# Patient Record
Sex: MALE | Race: WHITE | Employment: OTHER | URBAN - METROPOLITAN AREA
[De-identification: names, ages, dates, MRNs, and addresses within clinical notes are randomized per-mention and may not be internally consistent; named-entity substitution may affect disease eponyms.]

---

## 2017-12-05 ENCOUNTER — OFFICE VISIT (OUTPATIENT)
Dept: SURGERY | Facility: CLINIC | Age: 77
End: 2017-12-05

## 2017-12-05 VITALS
TEMPERATURE: 98 F | HEIGHT: 66 IN | BODY MASS INDEX: 22.98 KG/M2 | WEIGHT: 143 LBS | DIASTOLIC BLOOD PRESSURE: 83 MMHG | HEART RATE: 76 BPM | SYSTOLIC BLOOD PRESSURE: 135 MMHG

## 2017-12-05 DIAGNOSIS — K59.01 SLOW TRANSIT CONSTIPATION: ICD-10-CM

## 2017-12-05 DIAGNOSIS — I10 HYPERTENSION, UNSPECIFIED TYPE: ICD-10-CM

## 2017-12-05 DIAGNOSIS — Z90.49 HISTORY OF CHOLECYSTECTOMY: ICD-10-CM

## 2017-12-05 DIAGNOSIS — C61 PROSTATE CANCER (HCC): ICD-10-CM

## 2017-12-05 DIAGNOSIS — Z90.79 S/P PROSTATECTOMY: ICD-10-CM

## 2017-12-05 DIAGNOSIS — E78.49 OTHER HYPERLIPIDEMIA: ICD-10-CM

## 2017-12-05 DIAGNOSIS — D12.6 ADENOMATOUS POLYP OF COLON, UNSPECIFIED PART OF COLON: Primary | ICD-10-CM

## 2017-12-05 DIAGNOSIS — Z80.0 FAMILY HISTORY OF COLON CANCER: ICD-10-CM

## 2017-12-05 PROCEDURE — 99203 OFFICE O/P NEW LOW 30 MIN: CPT | Performed by: PHYSICIAN ASSISTANT

## 2017-12-05 RX ORDER — LEVOTHYROXINE SODIUM 0.03 MG/1
50 TABLET ORAL DAILY
COMMUNITY
Start: 2017-09-09 | End: 2021-01-21

## 2017-12-05 RX ORDER — POLYETHYLENE GLYCOL 3350, SODIUM CHLORIDE, SODIUM BICARBONATE, POTASSIUM CHLORIDE 420; 11.2; 5.72; 1.48 G/4L; G/4L; G/4L; G/4L
POWDER, FOR SOLUTION ORAL
Qty: 1 BOTTLE | Refills: 0 | Status: CANCELLED | OUTPATIENT
Start: 2017-12-05

## 2017-12-05 RX ORDER — ATORVASTATIN CALCIUM 10 MG/1
10 TABLET, FILM COATED ORAL NIGHTLY
COMMUNITY
Start: 2017-11-27

## 2017-12-07 NOTE — H&P
New Patient Visit Note       Active Problems      1. Adenomatous polyp of colon, unspecified part of colon    2. Family history of colon cancer    3. Slow transit constipation    4. Hypertension, unspecified type    5. Other hyperlipidemia    6.  Prostate c Social / Family History    The past medical and past surgical history have been reviewed by me today.     Past Medical History:   Diagnosis Date   • Essential hypertension    • Hyperlipidemia    • Prostate cancer [facility])    • Sleep apnea      Past Surgical Hi wheezing. Cardiovascular: Negative for chest pain, palpitations and leg swelling. Gastrointestinal: Negative for abdominal distention, abdominal pain, anal bleeding, blood in stool, constipation, diarrhea, nausea and vomiting.    Genitourinary: Norma Sánchez and confirmed negative in the left inguinal area. Lymphadenopathy:     He has no cervical adenopathy. Right cervical: No superficial cervical and no deep cervical adenopathy present.        Left cervical: No superficial cervical and no deep cervica for Surgery with Dr. Sesar Hopper. All risks, benefits, complications and alternatives to the proposed operation were fully discussed with the patient. All questions from the patient were answered in detail.  A description of the procedure and it's possible o

## 2018-10-23 ENCOUNTER — CHARTING TRANS (OUTPATIENT)
Dept: OTHER | Age: 78
End: 2018-10-23

## 2018-11-21 ENCOUNTER — CHARTING TRANS (OUTPATIENT)
Dept: OTHER | Age: 78
End: 2018-11-21

## 2018-12-04 ENCOUNTER — OFFICE VISIT (OUTPATIENT)
Dept: PULMONOLOGY | Age: 78
End: 2018-12-04

## 2018-12-04 VITALS
HEIGHT: 65 IN | SYSTOLIC BLOOD PRESSURE: 120 MMHG | DIASTOLIC BLOOD PRESSURE: 70 MMHG | HEART RATE: 76 BPM | OXYGEN SATURATION: 98 % | BODY MASS INDEX: 24.66 KG/M2 | WEIGHT: 148 LBS

## 2018-12-04 DIAGNOSIS — G47.33 OBSTRUCTIVE SLEEP APNEA (ADULT) (PEDIATRIC): Primary | ICD-10-CM

## 2018-12-04 PROCEDURE — 99203 OFFICE O/P NEW LOW 30 MIN: CPT | Performed by: INTERNAL MEDICINE

## 2018-12-04 RX ORDER — LEVOTHYROXINE SODIUM 0.05 MG/1
50 TABLET ORAL
COMMUNITY
Start: 2018-02-14 | End: 2019-02-11 | Stop reason: SDUPTHER

## 2018-12-04 RX ORDER — ATORVASTATIN CALCIUM 10 MG/1
10 TABLET, FILM COATED ORAL
COMMUNITY
Start: 2017-11-27 | End: 2019-01-29 | Stop reason: SDUPTHER

## 2018-12-04 RX ORDER — AMLODIPINE BESYLATE 5 MG/1
5 TABLET ORAL
COMMUNITY
Start: 2018-02-04 | End: 2019-01-18 | Stop reason: SDUPTHER

## 2018-12-04 RX ORDER — LOSARTAN POTASSIUM AND HYDROCHLOROTHIAZIDE 25; 100 MG/1; MG/1
TABLET ORAL
COMMUNITY
End: 2019-01-29

## 2018-12-04 SDOH — HEALTH STABILITY: MENTAL HEALTH: HOW OFTEN DO YOU HAVE A DRINK CONTAINING ALCOHOL?: NEVER

## 2018-12-18 ENCOUNTER — DOCUMENTATION (OUTPATIENT)
Dept: PULMONOLOGY | Age: 78
End: 2018-12-18

## 2019-01-01 ENCOUNTER — EXTERNAL RECORD (OUTPATIENT)
Dept: HEALTH INFORMATION MANAGEMENT | Facility: OTHER | Age: 79
End: 2019-01-01

## 2019-01-15 DIAGNOSIS — E03.9 HYPOTHYROIDISM, UNSPECIFIED TYPE: ICD-10-CM

## 2019-01-15 DIAGNOSIS — E78.5 HYPERLIPIDEMIA, UNSPECIFIED HYPERLIPIDEMIA TYPE: ICD-10-CM

## 2019-01-15 DIAGNOSIS — G47.33 OSA ON CPAP: ICD-10-CM

## 2019-01-15 DIAGNOSIS — Z86.010 HISTORY OF COLON POLYPS: ICD-10-CM

## 2019-01-15 DIAGNOSIS — Z85.46 HX OF PROSTATIC MALIGNANCY: ICD-10-CM

## 2019-01-15 DIAGNOSIS — I10 ESSENTIAL HYPERTENSION: Primary | ICD-10-CM

## 2019-01-18 ENCOUNTER — TELEPHONE (OUTPATIENT)
Dept: FAMILY MEDICINE | Age: 79
End: 2019-01-18

## 2019-01-18 RX ORDER — AMLODIPINE BESYLATE 5 MG/1
5 TABLET ORAL DAILY
Qty: 30 TABLET | Refills: 2 | Status: SHIPPED | OUTPATIENT
Start: 2019-01-18 | End: 2019-02-11 | Stop reason: SDUPTHER

## 2019-01-24 ENCOUNTER — IMAGING SERVICES (OUTPATIENT)
Dept: OTHER | Age: 79
End: 2019-01-24

## 2019-01-29 ENCOUNTER — TELEPHONE (OUTPATIENT)
Dept: FAMILY MEDICINE | Age: 79
End: 2019-01-29

## 2019-01-29 RX ORDER — LOSARTAN POTASSIUM 100 MG/1
100 TABLET ORAL DAILY
Qty: 90 TABLET | Refills: 0 | Status: SHIPPED | OUTPATIENT
Start: 2019-01-29 | End: 2019-02-11 | Stop reason: SDUPTHER

## 2019-01-29 RX ORDER — LOSARTAN POTASSIUM 100 MG/1
100 TABLET ORAL DAILY
COMMUNITY
Start: 2018-01-08 | End: 2019-01-29 | Stop reason: SDUPTHER

## 2019-01-29 RX ORDER — ATORVASTATIN CALCIUM 10 MG/1
10 TABLET, FILM COATED ORAL DAILY
Qty: 90 TABLET | Refills: 0 | Status: SHIPPED | OUTPATIENT
Start: 2019-01-29 | End: 2019-02-11 | Stop reason: SDUPTHER

## 2019-02-11 ENCOUNTER — OFFICE VISIT (OUTPATIENT)
Dept: FAMILY MEDICINE | Age: 79
End: 2019-02-11

## 2019-02-11 VITALS
OXYGEN SATURATION: 97 % | HEART RATE: 84 BPM | BODY MASS INDEX: 25.52 KG/M2 | WEIGHT: 151 LBS | DIASTOLIC BLOOD PRESSURE: 74 MMHG | SYSTOLIC BLOOD PRESSURE: 116 MMHG | TEMPERATURE: 97.7 F

## 2019-02-11 DIAGNOSIS — E03.9 HYPOTHYROIDISM, UNSPECIFIED TYPE: ICD-10-CM

## 2019-02-11 DIAGNOSIS — J31.0 RHINITIS, CHRONIC: ICD-10-CM

## 2019-02-11 DIAGNOSIS — E78.5 HYPERLIPIDEMIA, UNSPECIFIED HYPERLIPIDEMIA TYPE: ICD-10-CM

## 2019-02-11 DIAGNOSIS — I10 ESSENTIAL HYPERTENSION: Primary | ICD-10-CM

## 2019-02-11 DIAGNOSIS — R09.82 POSTNASAL DRIP: ICD-10-CM

## 2019-02-11 PROCEDURE — 99214 OFFICE O/P EST MOD 30 MIN: CPT | Performed by: FAMILY MEDICINE

## 2019-02-11 RX ORDER — LEVOTHYROXINE SODIUM 0.05 MG/1
50 TABLET ORAL DAILY
Qty: 90 TABLET | Refills: 3 | Status: SHIPPED | OUTPATIENT
Start: 2019-02-11 | End: 2019-04-25 | Stop reason: SDUPTHER

## 2019-02-11 RX ORDER — AMLODIPINE BESYLATE 5 MG/1
5 TABLET ORAL DAILY
Qty: 90 TABLET | Refills: 3 | Status: SHIPPED | OUTPATIENT
Start: 2019-02-11 | End: 2019-04-25 | Stop reason: SDUPTHER

## 2019-02-11 RX ORDER — ATORVASTATIN CALCIUM 10 MG/1
10 TABLET, FILM COATED ORAL DAILY
Qty: 90 TABLET | Refills: 3 | Status: SHIPPED | OUTPATIENT
Start: 2019-02-11 | End: 2019-04-25 | Stop reason: SDUPTHER

## 2019-02-11 RX ORDER — LOSARTAN POTASSIUM 100 MG/1
100 TABLET ORAL DAILY
Qty: 90 TABLET | Refills: 3 | Status: SHIPPED | OUTPATIENT
Start: 2019-02-11 | End: 2019-04-25 | Stop reason: SDUPTHER

## 2019-02-11 SDOH — HEALTH STABILITY: MENTAL HEALTH: HOW OFTEN DO YOU HAVE A DRINK CONTAINING ALCOHOL?: NEVER

## 2019-02-15 ENCOUNTER — OFFICE VISIT (OUTPATIENT)
Dept: FAMILY MEDICINE | Age: 79
End: 2019-02-15

## 2019-02-15 VITALS
SYSTOLIC BLOOD PRESSURE: 122 MMHG | WEIGHT: 150 LBS | BODY MASS INDEX: 25.35 KG/M2 | OXYGEN SATURATION: 98 % | HEART RATE: 86 BPM | DIASTOLIC BLOOD PRESSURE: 80 MMHG

## 2019-02-15 DIAGNOSIS — R22.2 ABDOMINAL WALL LUMP: Primary | ICD-10-CM

## 2019-02-15 PROBLEM — I10 ESSENTIAL HYPERTENSION, BENIGN: Status: ACTIVE | Noted: 2017-05-11

## 2019-02-15 PROBLEM — D12.6 ADENOMATOUS POLYP OF COLON: Status: ACTIVE | Noted: 2017-12-05

## 2019-02-15 PROBLEM — E78.49 OTHER HYPERLIPIDEMIA: Status: ACTIVE | Noted: 2017-12-05

## 2019-02-15 PROBLEM — Z90.49 HISTORY OF CHOLECYSTECTOMY: Status: ACTIVE | Noted: 2017-12-05

## 2019-02-15 PROBLEM — C61 CARCINOMA OF PROSTATE (CMD): Status: ACTIVE | Noted: 2017-05-11

## 2019-02-15 PROBLEM — C61 MALIGNANT NEOPLASM OF PROSTATE (CMD): Status: ACTIVE | Noted: 2017-05-11

## 2019-02-15 PROBLEM — Z90.79 S/P PROSTATECTOMY: Status: ACTIVE | Noted: 2017-12-05

## 2019-02-15 PROBLEM — N39.42 URINARY INCONTINENCE WITHOUT SENSORY AWARENESS: Status: ACTIVE | Noted: 2017-05-11

## 2019-02-15 PROBLEM — I10 HTN (HYPERTENSION): Status: ACTIVE | Noted: 2017-12-05

## 2019-02-15 PROCEDURE — 99213 OFFICE O/P EST LOW 20 MIN: CPT | Performed by: PHYSICIAN ASSISTANT

## 2019-02-16 ENCOUNTER — IMAGING SERVICES (OUTPATIENT)
Dept: ULTRASOUND IMAGING | Age: 79
End: 2019-02-16
Attending: PHYSICIAN ASSISTANT

## 2019-02-16 DIAGNOSIS — R22.2 ABDOMINAL WALL LUMP: ICD-10-CM

## 2019-02-16 PROCEDURE — 76705 ECHO EXAM OF ABDOMEN: CPT | Performed by: RADIOLOGY

## 2019-02-18 DIAGNOSIS — K42.9 UMBILICAL HERNIA WITHOUT OBSTRUCTION OR GANGRENE: Primary | ICD-10-CM

## 2019-02-18 RX ORDER — CHLORDIAZEPOXIDE HYDROCHLORIDE 5 MG/1
CAPSULE, GELATIN COATED ORAL
COMMUNITY
End: 2019-04-25 | Stop reason: ALTCHOICE

## 2019-02-18 RX ORDER — MOMETASONE FUROATE 1 MG/G
OINTMENT TOPICAL
COMMUNITY
End: 2020-09-09 | Stop reason: ALTCHOICE

## 2019-02-25 ENCOUNTER — TELEPHONE (OUTPATIENT)
Dept: SURGERY | Age: 79
End: 2019-02-25

## 2019-02-25 ENCOUNTER — OFFICE VISIT (OUTPATIENT)
Dept: SURGERY | Age: 79
End: 2019-02-25

## 2019-02-25 VITALS — HEIGHT: 64 IN | BODY MASS INDEX: 24.24 KG/M2 | WEIGHT: 142 LBS | TEMPERATURE: 98.4 F

## 2019-02-25 DIAGNOSIS — K43.2 INCISIONAL HERNIA, WITHOUT OBSTRUCTION OR GANGRENE: Primary | ICD-10-CM

## 2019-02-25 PROCEDURE — 99204 OFFICE O/P NEW MOD 45 MIN: CPT | Performed by: SURGERY

## 2019-02-25 SDOH — HEALTH STABILITY: MENTAL HEALTH: HOW OFTEN DO YOU HAVE A DRINK CONTAINING ALCOHOL?: NEVER

## 2019-02-25 ASSESSMENT — ENCOUNTER SYMPTOMS
RESPIRATORY NEGATIVE: 1
ENDOCRINE NEGATIVE: 1
GASTROINTESTINAL NEGATIVE: 1
NEUROLOGICAL NEGATIVE: 1
ALLERGIC/IMMUNOLOGIC NEGATIVE: 1
HEMATOLOGIC/LYMPHATIC NEGATIVE: 1
CONSTITUTIONAL NEGATIVE: 1

## 2019-02-26 ENCOUNTER — TELEPHONE (OUTPATIENT)
Dept: FAMILY MEDICINE | Age: 79
End: 2019-02-26

## 2019-02-26 DIAGNOSIS — K43.2 INCISIONAL HERNIA, WITHOUT OBSTRUCTION OR GANGRENE: Primary | ICD-10-CM

## 2019-02-26 PROCEDURE — 93000 ELECTROCARDIOGRAM COMPLETE: CPT | Performed by: FAMILY MEDICINE

## 2019-02-27 ENCOUNTER — LAB SERVICES (OUTPATIENT)
Dept: LAB | Age: 79
End: 2019-02-27

## 2019-02-27 DIAGNOSIS — K43.2 INCISIONAL HERNIA, WITHOUT OBSTRUCTION OR GANGRENE: ICD-10-CM

## 2019-02-27 LAB
ALBUMIN SERPL-MCNC: 3.8 G/DL (ref 3.6–5.1)
ALP SERPL-CCNC: 99 U/L (ref 45–115)
ALT SERPL W/O P-5'-P-CCNC: 30 U/L (ref 5–49)
AST SERPL-CCNC: 30 U/L (ref 14–43)
BASOPHIL %: 1.2 % (ref 0–1.2)
BASOPHIL ABSOLUTE #: 0.1 10*3/UL (ref 0–0.1)
BILIRUB SERPL-MCNC: 0.9 MG/DL (ref 0–1.3)
BUN SERPL-MCNC: 15 MG/DL (ref 6–27)
CALCIUM SERPL-MCNC: 9.3 MG/DL (ref 8.6–10.6)
CHLORIDE SERPL-SCNC: 108 MMOL/L (ref 96–107)
CO2 SERPL-SCNC: 30 MMOL/L (ref 22–32)
CREAT SERPL-MCNC: 0.8 MG/DL (ref 0.6–1.6)
DIFFERENTIAL TYPE: ABNORMAL
EOSINOPHIL %: 2.7 % (ref 0–10)
EOSINOPHIL ABSOLUTE #: 0.1 10*3/UL (ref 0–0.5)
GFR SERPL CREATININE-BSD FRML MDRD: >60 ML/MIN/{1.73M2}
GFR SERPL CREATININE-BSD FRML MDRD: >60 ML/MIN/{1.73M2}
GLUCOSE SERPL-MCNC: 95 MG/DL (ref 70–200)
HEMATOCRIT: 43.5 % (ref 40–51)
HEMOGLOBIN: 14.6 G/DL (ref 13.7–17.5)
LYMPH PERCENT: 22.7 % (ref 20.5–51.1)
LYMPHOCYTE ABSOLUTE #: 1.2 10*3/UL (ref 1.2–3.4)
MEAN CORPUSCULAR HGB CONCENTRATION: 33.6 % (ref 32–36)
MEAN CORPUSCULAR HGB: 32.6 PG (ref 27–34)
MEAN CORPUSCULAR VOLUME: 97.1 FL (ref 79–95)
MEAN PLATELET VOLUME: 10.8 FL (ref 8.6–12.4)
MONOCYTE ABSOLUTE #: 0.4 10*3/UL (ref 0.2–0.9)
MONOCYTE PERCENT: 8.3 % (ref 4.3–12.9)
NEUTROPHIL ABSOLUTE #: 3.4 10*3/UL (ref 1.4–6.5)
NEUTROPHIL PERCENT: 65.1 % (ref 34–73.5)
PLATELET COUNT: 172 10*3/UL (ref 150–400)
POTASSIUM SERPL-SCNC: 4.3 MMOL/L (ref 3.5–5.3)
PROT SERPL-MCNC: 6.3 G/DL (ref 6.4–8.5)
RED BLOOD CELL COUNT: 4.48 10*6/UL (ref 3.9–5.7)
RED CELL DISTRIBUTION WIDTH: 12.7 % (ref 11.3–14.8)
SODIUM SERPL-SCNC: 140 MMOL/L (ref 136–146)
WHITE BLOOD CELL COUNT: 5.2 10*3/UL (ref 4–10)

## 2019-02-27 PROCEDURE — 85025 COMPLETE CBC W/AUTO DIFF WBC: CPT | Performed by: FAMILY MEDICINE

## 2019-02-27 PROCEDURE — 36415 COLL VENOUS BLD VENIPUNCTURE: CPT | Performed by: FAMILY MEDICINE

## 2019-02-27 PROCEDURE — 80053 COMPREHEN METABOLIC PANEL: CPT | Performed by: FAMILY MEDICINE

## 2019-02-28 ENCOUNTER — APPOINTMENT (OUTPATIENT)
Dept: FAMILY MEDICINE | Age: 79
End: 2019-02-28

## 2019-02-28 ENCOUNTER — IMAGING SERVICES (OUTPATIENT)
Dept: GENERAL RADIOLOGY | Age: 79
End: 2019-02-28
Attending: INTERNAL MEDICINE

## 2019-02-28 ENCOUNTER — OFFICE VISIT (OUTPATIENT)
Dept: INTERNAL MEDICINE | Age: 79
End: 2019-02-28

## 2019-02-28 VITALS
OXYGEN SATURATION: 98 % | BODY MASS INDEX: 26.05 KG/M2 | WEIGHT: 152.6 LBS | DIASTOLIC BLOOD PRESSURE: 76 MMHG | RESPIRATION RATE: 16 BRPM | HEIGHT: 64 IN | HEART RATE: 70 BPM | SYSTOLIC BLOOD PRESSURE: 120 MMHG | TEMPERATURE: 97.6 F

## 2019-02-28 DIAGNOSIS — Z01.818 PREOP EXAMINATION: Primary | ICD-10-CM

## 2019-02-28 DIAGNOSIS — R05.9 COUGH: ICD-10-CM

## 2019-02-28 DIAGNOSIS — Z01.818 PREOP EXAMINATION: ICD-10-CM

## 2019-02-28 DIAGNOSIS — K43.2 INCISIONAL HERNIA, WITHOUT OBSTRUCTION OR GANGRENE: ICD-10-CM

## 2019-02-28 PROCEDURE — 99213 OFFICE O/P EST LOW 20 MIN: CPT | Performed by: INTERNAL MEDICINE

## 2019-02-28 PROCEDURE — 71046 X-RAY EXAM CHEST 2 VIEWS: CPT | Performed by: RADIOLOGY

## 2019-02-28 SDOH — HEALTH STABILITY: MENTAL HEALTH: HOW OFTEN DO YOU HAVE A DRINK CONTAINING ALCOHOL?: NEVER

## 2019-03-06 ENCOUNTER — EXTERNAL RECORD (OUTPATIENT)
Dept: SURGERY | Age: 79
End: 2019-03-06

## 2019-03-06 ENCOUNTER — APPOINTMENT (OUTPATIENT)
Dept: SURGERY | Age: 79
End: 2019-03-06

## 2019-03-14 ENCOUNTER — TELEPHONE (OUTPATIENT)
Dept: FAMILY MEDICINE | Age: 79
End: 2019-03-14

## 2019-03-18 ENCOUNTER — APPOINTMENT (OUTPATIENT)
Dept: SURGERY | Age: 79
End: 2019-03-18

## 2019-03-19 ENCOUNTER — TELEPHONE (OUTPATIENT)
Dept: PULMONOLOGY | Age: 79
End: 2019-03-19

## 2019-03-19 ENCOUNTER — OFFICE VISIT (OUTPATIENT)
Dept: SURGERY | Age: 79
End: 2019-03-19

## 2019-03-19 VITALS
TEMPERATURE: 97.9 F | SYSTOLIC BLOOD PRESSURE: 120 MMHG | DIASTOLIC BLOOD PRESSURE: 68 MMHG | WEIGHT: 135 LBS | BODY MASS INDEX: 22.49 KG/M2 | HEIGHT: 65 IN

## 2019-03-19 DIAGNOSIS — K43.2 INCISIONAL HERNIA, WITHOUT OBSTRUCTION OR GANGRENE: Primary | ICD-10-CM

## 2019-03-19 PROCEDURE — 99024 POSTOP FOLLOW-UP VISIT: CPT | Performed by: SURGERY

## 2019-04-23 ENCOUNTER — TELEPHONE (OUTPATIENT)
Dept: FAMILY MEDICINE | Age: 79
End: 2019-04-23

## 2019-04-24 ENCOUNTER — LAB SERVICES (OUTPATIENT)
Dept: LAB | Age: 79
End: 2019-04-24

## 2019-04-24 DIAGNOSIS — E78.5 HYPERLIPIDEMIA, UNSPECIFIED HYPERLIPIDEMIA TYPE: ICD-10-CM

## 2019-04-24 DIAGNOSIS — Z86.010 HISTORY OF COLON POLYPS: ICD-10-CM

## 2019-04-24 DIAGNOSIS — I10 ESSENTIAL HYPERTENSION: ICD-10-CM

## 2019-04-24 DIAGNOSIS — Z85.46 HX OF PROSTATIC MALIGNANCY: ICD-10-CM

## 2019-04-24 DIAGNOSIS — E03.9 HYPOTHYROIDISM, UNSPECIFIED TYPE: ICD-10-CM

## 2019-04-24 DIAGNOSIS — G47.33 OSA ON CPAP: ICD-10-CM

## 2019-04-24 LAB
ALBUMIN SERPL-MCNC: 3.8 G/DL (ref 3.6–5.1)
ALP SERPL-CCNC: 96 U/L (ref 45–115)
ALT SERPL W/O P-5'-P-CCNC: 19 U/L (ref 5–49)
AST SERPL-CCNC: 25 U/L (ref 14–43)
BASOPHIL %: 0.9 % (ref 0–1.2)
BASOPHIL ABSOLUTE #: 0 10*3/UL (ref 0–0.1)
BILIRUB SERPL-MCNC: 0.9 MG/DL (ref 0–1.3)
BUN SERPL-MCNC: 19 MG/DL (ref 6–27)
CALCIUM SERPL-MCNC: 9.5 MG/DL (ref 8.6–10.6)
CHLORIDE SERPL-SCNC: 108 MMOL/L (ref 96–107)
CHOLEST SERPL-MCNC: 131 MG/DL (ref 140–200)
CO2 SERPL-SCNC: 31 MMOL/L (ref 22–32)
CREAT SERPL-MCNC: 0.9 MG/DL (ref 0.6–1.6)
DIFFERENTIAL TYPE: ABNORMAL
EOSINOPHIL %: 3.5 % (ref 0–10)
EOSINOPHIL ABSOLUTE #: 0.2 10*3/UL (ref 0–0.5)
GFR SERPL CREATININE-BSD FRML MDRD: >60 ML/MIN/{1.73M2}
GFR SERPL CREATININE-BSD FRML MDRD: >60 ML/MIN/{1.73M2}
GLUCOSE P FAST SERPL-MCNC: 95 MG/DL (ref 60–100)
HDLC SERPL-MCNC: 52 MG/DL
HEMATOCRIT: 44.1 % (ref 40–51)
HEMOGLOBIN: 15 G/DL (ref 13.7–17.5)
LDLC SERPL CALC-MCNC: 72 MG/DL (ref 30–100)
LYMPH PERCENT: 25.2 % (ref 20.5–51.1)
LYMPHOCYTE ABSOLUTE #: 1.1 10*3/UL (ref 1.2–3.4)
MEAN CORPUSCULAR HGB CONCENTRATION: 34 % (ref 32–36)
MEAN CORPUSCULAR HGB: 32.8 PG (ref 27–34)
MEAN CORPUSCULAR VOLUME: 96.5 FL (ref 79–95)
MEAN PLATELET VOLUME: 10.4 FL (ref 8.6–12.4)
MONOCYTE ABSOLUTE #: 0.4 10*3/UL (ref 0.2–0.9)
MONOCYTE PERCENT: 9.6 % (ref 4.3–12.9)
NEUTROPHIL ABSOLUTE #: 2.6 10*3/UL (ref 1.4–6.5)
NEUTROPHIL PERCENT: 60.8 % (ref 34–73.5)
PLATELET COUNT: 169 10*3/UL (ref 150–400)
POTASSIUM SERPL-SCNC: 4.2 MMOL/L (ref 3.5–5.3)
PROT SERPL-MCNC: 6.5 G/DL (ref 6.4–8.5)
RED BLOOD CELL COUNT: 4.57 10*6/UL (ref 3.9–5.7)
RED CELL DISTRIBUTION WIDTH: 13 % (ref 11.3–14.8)
SODIUM SERPL-SCNC: 140 MMOL/L (ref 136–146)
TRIGL SERPL-MCNC: 36 MG/DL (ref 0–200)
TSH SERPL DL<=0.05 MIU/L-ACNC: 1.92 M[IU]/L (ref 0.3–4.82)
WHITE BLOOD CELL COUNT: 4.3 10*3/UL (ref 4–10)

## 2019-04-24 PROCEDURE — 80061 LIPID PANEL: CPT | Performed by: FAMILY MEDICINE

## 2019-04-24 PROCEDURE — 80053 COMPREHEN METABOLIC PANEL: CPT | Performed by: FAMILY MEDICINE

## 2019-04-24 PROCEDURE — 36415 COLL VENOUS BLD VENIPUNCTURE: CPT | Performed by: FAMILY MEDICINE

## 2019-04-24 PROCEDURE — 85025 COMPLETE CBC W/AUTO DIFF WBC: CPT | Performed by: FAMILY MEDICINE

## 2019-04-24 PROCEDURE — 84443 ASSAY THYROID STIM HORMONE: CPT | Performed by: FAMILY MEDICINE

## 2019-04-25 ENCOUNTER — OFFICE VISIT (OUTPATIENT)
Dept: FAMILY MEDICINE | Age: 79
End: 2019-04-25

## 2019-04-25 VITALS
SYSTOLIC BLOOD PRESSURE: 116 MMHG | TEMPERATURE: 96.7 F | OXYGEN SATURATION: 97 % | BODY MASS INDEX: 24.32 KG/M2 | HEART RATE: 63 BPM | DIASTOLIC BLOOD PRESSURE: 70 MMHG | WEIGHT: 146 LBS | HEIGHT: 65 IN

## 2019-04-25 DIAGNOSIS — Z85.46 HISTORY OF PROSTATE CANCER: ICD-10-CM

## 2019-04-25 DIAGNOSIS — E78.00 PURE HYPERCHOLESTEROLEMIA: ICD-10-CM

## 2019-04-25 DIAGNOSIS — G47.33 OSA ON CPAP: ICD-10-CM

## 2019-04-25 DIAGNOSIS — Z23 NEED FOR VACCINATION: ICD-10-CM

## 2019-04-25 DIAGNOSIS — I10 ESSENTIAL HYPERTENSION: ICD-10-CM

## 2019-04-25 DIAGNOSIS — Z90.79 S/P PROSTATECTOMY: ICD-10-CM

## 2019-04-25 DIAGNOSIS — C61 PROSTATE CANCER (CMD): ICD-10-CM

## 2019-04-25 DIAGNOSIS — Z00.01 ENCOUNTER FOR GENERAL ADULT MEDICAL EXAMINATION WITH ABNORMAL FINDINGS: Primary | ICD-10-CM

## 2019-04-25 DIAGNOSIS — E03.9 ACQUIRED HYPOTHYROIDISM: ICD-10-CM

## 2019-04-25 PROCEDURE — 90670 PCV13 VACCINE IM: CPT

## 2019-04-25 PROCEDURE — G0009 ADMIN PNEUMOCOCCAL VACCINE: HCPCS

## 2019-04-25 PROCEDURE — G0439 PPPS, SUBSEQ VISIT: HCPCS | Performed by: FAMILY MEDICINE

## 2019-04-25 PROCEDURE — 90715 TDAP VACCINE 7 YRS/> IM: CPT

## 2019-04-25 PROCEDURE — 99214 OFFICE O/P EST MOD 30 MIN: CPT | Performed by: FAMILY MEDICINE

## 2019-04-25 PROCEDURE — 90472 IMMUNIZATION ADMIN EACH ADD: CPT

## 2019-04-25 RX ORDER — LOSARTAN POTASSIUM 100 MG/1
100 TABLET ORAL DAILY
Qty: 90 TABLET | Refills: 3 | Status: SHIPPED | OUTPATIENT
Start: 2019-04-25 | End: 2020-03-18 | Stop reason: SDUPTHER

## 2019-04-25 RX ORDER — AMLODIPINE BESYLATE 5 MG/1
5 TABLET ORAL DAILY
Qty: 90 TABLET | Refills: 3 | Status: SHIPPED | OUTPATIENT
Start: 2019-04-25 | End: 2020-03-31 | Stop reason: DRUGHIGH

## 2019-04-25 RX ORDER — LEVOTHYROXINE SODIUM 0.05 MG/1
50 TABLET ORAL DAILY
Qty: 90 TABLET | Refills: 3 | Status: SHIPPED | OUTPATIENT
Start: 2019-04-25 | End: 2020-08-19

## 2019-04-25 RX ORDER — ATORVASTATIN CALCIUM 10 MG/1
10 TABLET, FILM COATED ORAL DAILY
Qty: 90 TABLET | Refills: 3 | Status: SHIPPED | OUTPATIENT
Start: 2019-04-25 | End: 2020-04-13

## 2019-04-25 ASSESSMENT — PATIENT HEALTH QUESTIONNAIRE - PHQ9
1. LITTLE INTEREST OR PLEASURE IN DOING THINGS: NOT AT ALL
SUM OF ALL RESPONSES TO PHQ9 QUESTIONS 1 AND 2: 0
2. FEELING DOWN, DEPRESSED OR HOPELESS: NOT AT ALL
SUM OF ALL RESPONSES TO PHQ9 QUESTIONS 1 AND 2: 0

## 2019-04-25 ASSESSMENT — ENCOUNTER SYMPTOMS
WHEEZING: 0
HEADACHES: 0
SHORTNESS OF BREATH: 0
CONSTIPATION: 1
VOMITING: 0
NAUSEA: 0
DIARRHEA: 0

## 2019-08-26 ENCOUNTER — LAB SERVICES (OUTPATIENT)
Dept: LAB | Age: 79
End: 2019-08-26

## 2019-08-26 DIAGNOSIS — I10 ESSENTIAL HYPERTENSION: ICD-10-CM

## 2019-08-26 DIAGNOSIS — Z00.01 ENCOUNTER FOR GENERAL ADULT MEDICAL EXAMINATION WITH ABNORMAL FINDINGS: ICD-10-CM

## 2019-08-26 DIAGNOSIS — C61 PROSTATE CANCER (CMD): ICD-10-CM

## 2019-08-26 DIAGNOSIS — E03.9 ACQUIRED HYPOTHYROIDISM: ICD-10-CM

## 2019-08-26 DIAGNOSIS — Z90.79 S/P PROSTATECTOMY: ICD-10-CM

## 2019-08-26 DIAGNOSIS — E78.00 PURE HYPERCHOLESTEROLEMIA: ICD-10-CM

## 2019-08-26 PROCEDURE — 80053 COMPREHEN METABOLIC PANEL: CPT | Performed by: FAMILY MEDICINE

## 2019-08-26 PROCEDURE — 84153 ASSAY OF PSA TOTAL: CPT | Performed by: FAMILY MEDICINE

## 2019-08-26 PROCEDURE — 36415 COLL VENOUS BLD VENIPUNCTURE: CPT | Performed by: FAMILY MEDICINE

## 2019-08-26 PROCEDURE — 80061 LIPID PANEL: CPT | Performed by: FAMILY MEDICINE

## 2019-08-26 PROCEDURE — 84443 ASSAY THYROID STIM HORMONE: CPT | Performed by: FAMILY MEDICINE

## 2019-08-27 ENCOUNTER — OFFICE VISIT (OUTPATIENT)
Dept: FAMILY MEDICINE | Age: 79
End: 2019-08-27

## 2019-08-27 VITALS
OXYGEN SATURATION: 98 % | WEIGHT: 145 LBS | TEMPERATURE: 96.6 F | SYSTOLIC BLOOD PRESSURE: 112 MMHG | HEART RATE: 74 BPM | DIASTOLIC BLOOD PRESSURE: 60 MMHG | HEIGHT: 65 IN | BODY MASS INDEX: 24.16 KG/M2

## 2019-08-27 DIAGNOSIS — E78.00 PURE HYPERCHOLESTEROLEMIA: ICD-10-CM

## 2019-08-27 DIAGNOSIS — I10 ESSENTIAL HYPERTENSION: Primary | ICD-10-CM

## 2019-08-27 DIAGNOSIS — E03.9 ACQUIRED HYPOTHYROIDISM: ICD-10-CM

## 2019-08-27 DIAGNOSIS — Z85.46 HISTORY OF PROSTATE CANCER: ICD-10-CM

## 2019-08-27 DIAGNOSIS — R41.3 MEMORY DIFFICULTIES: ICD-10-CM

## 2019-08-27 DIAGNOSIS — F41.9 ANXIETY: ICD-10-CM

## 2019-08-27 LAB
ALBUMIN SERPL-MCNC: 4 G/DL (ref 3.6–5.1)
ALP SERPL-CCNC: 87 U/L (ref 45–115)
ALT SERPL W/O P-5'-P-CCNC: 24 U/L (ref 5–49)
AST SERPL-CCNC: 29 U/L (ref 14–43)
BILIRUB SERPL-MCNC: 1.2 MG/DL (ref 0–1.3)
BUN SERPL-MCNC: 17 MG/DL (ref 6–27)
CALCIUM SERPL-MCNC: 9.6 MG/DL (ref 8.6–10.6)
CHLORIDE SERPL-SCNC: 107 MMOL/L (ref 96–107)
CHOLEST SERPL-MCNC: 122 MG/DL (ref 140–200)
CO2 SERPL-SCNC: 30 MMOL/L (ref 22–32)
CREAT SERPL-MCNC: 0.9 MG/DL (ref 0.6–1.6)
GFR SERPL CREATININE-BSD FRML MDRD: >60 ML/MIN/{1.73M2}
GFR SERPL CREATININE-BSD FRML MDRD: >60 ML/MIN/{1.73M2}
GLUCOSE P FAST SERPL-MCNC: 99 MG/DL (ref 60–100)
HDLC SERPL-MCNC: 50 MG/DL
LDLC SERPL CALC-MCNC: 62 MG/DL (ref 30–100)
POTASSIUM SERPL-SCNC: 4.3 MMOL/L (ref 3.5–5.3)
PROT SERPL-MCNC: 6.6 G/DL (ref 6.4–8.5)
PSA SERPL-MCNC: <0.06 NG/ML (ref 0–6.4)
SODIUM SERPL-SCNC: 143 MMOL/L (ref 136–146)
TRIGL SERPL-MCNC: 49 MG/DL (ref 0–200)
TSH SERPL DL<=0.05 MIU/L-ACNC: 2.3 M[IU]/L (ref 0.3–4.82)

## 2019-08-27 PROCEDURE — 99214 OFFICE O/P EST MOD 30 MIN: CPT | Performed by: FAMILY MEDICINE

## 2019-08-27 RX ORDER — ESCITALOPRAM OXALATE 5 MG/1
5 TABLET ORAL NIGHTLY
Qty: 90 TABLET | Refills: 2 | Status: SHIPPED | OUTPATIENT
Start: 2019-08-27 | End: 2020-02-04

## 2019-08-27 SDOH — HEALTH STABILITY: MENTAL HEALTH: HOW OFTEN DO YOU HAVE A DRINK CONTAINING ALCOHOL?: NEVER

## 2019-09-11 ENCOUNTER — TELEPHONE (OUTPATIENT)
Dept: FAMILY MEDICINE | Age: 79
End: 2019-09-11

## 2019-09-28 ENCOUNTER — TELEPHONE (OUTPATIENT)
Dept: SCHEDULING | Age: 79
End: 2019-09-28

## 2019-09-30 ENCOUNTER — APPOINTMENT (OUTPATIENT)
Dept: FAMILY MEDICINE | Age: 79
End: 2019-09-30

## 2019-12-06 ENCOUNTER — TELEPHONE (OUTPATIENT)
Dept: FAMILY MEDICINE | Age: 79
End: 2019-12-06

## 2019-12-06 ENCOUNTER — OFFICE VISIT (OUTPATIENT)
Dept: FAMILY MEDICINE | Age: 79
End: 2019-12-06

## 2019-12-06 VITALS
BODY MASS INDEX: 23.32 KG/M2 | OXYGEN SATURATION: 99 % | HEIGHT: 65 IN | HEART RATE: 79 BPM | WEIGHT: 140 LBS | DIASTOLIC BLOOD PRESSURE: 80 MMHG | SYSTOLIC BLOOD PRESSURE: 128 MMHG

## 2019-12-06 DIAGNOSIS — J01.90 ACUTE NON-RECURRENT SINUSITIS, UNSPECIFIED LOCATION: Primary | ICD-10-CM

## 2019-12-06 DIAGNOSIS — R05.9 COUGH: ICD-10-CM

## 2019-12-06 PROCEDURE — 99214 OFFICE O/P EST MOD 30 MIN: CPT | Performed by: PHYSICIAN ASSISTANT

## 2019-12-06 RX ORDER — DOXYCYCLINE HYCLATE 100 MG/1
100 CAPSULE ORAL 2 TIMES DAILY
Qty: 20 CAPSULE | Refills: 0 | Status: SHIPPED | OUTPATIENT
Start: 2019-12-06 | End: 2020-03-07 | Stop reason: SDUPTHER

## 2019-12-06 ASSESSMENT — ENCOUNTER SYMPTOMS
FATIGUE: 0
SINUS PRESSURE: 1
EYES NEGATIVE: 1
COUGH: 1
RHINORRHEA: 1
CHILLS: 0
FEVER: 1
HEADACHES: 1
SORE THROAT: 0

## 2020-02-04 ENCOUNTER — TELEPHONE (OUTPATIENT)
Dept: FAMILY MEDICINE | Age: 80
End: 2020-02-04

## 2020-02-23 ENCOUNTER — EXTERNAL RECORD (OUTPATIENT)
Dept: HEALTH INFORMATION MANAGEMENT | Facility: OTHER | Age: 80
End: 2020-02-23

## 2020-02-23 PROCEDURE — 93010 ELECTROCARDIOGRAM REPORT: CPT | Performed by: INTERNAL MEDICINE

## 2020-02-24 ENCOUNTER — TELEPHONE (OUTPATIENT)
Dept: FAMILY MEDICINE | Age: 80
End: 2020-02-24

## 2020-02-25 ENCOUNTER — OFFICE VISIT (OUTPATIENT)
Dept: FAMILY MEDICINE | Age: 80
End: 2020-02-25

## 2020-02-25 VITALS
OXYGEN SATURATION: 98 % | SYSTOLIC BLOOD PRESSURE: 126 MMHG | DIASTOLIC BLOOD PRESSURE: 70 MMHG | TEMPERATURE: 96.7 F | HEIGHT: 66 IN | BODY MASS INDEX: 22.82 KG/M2 | WEIGHT: 142 LBS | HEART RATE: 96 BPM

## 2020-02-25 DIAGNOSIS — R09.81 NASAL CONGESTION: ICD-10-CM

## 2020-02-25 DIAGNOSIS — N17.9 AKI (ACUTE KIDNEY INJURY) (CMD): ICD-10-CM
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
## 2020-02-25 DIAGNOSIS — R09.82 POSTNASAL DRIP: Primary | ICD-10-CM

## 2020-02-25 DIAGNOSIS — I10 ESSENTIAL HYPERTENSION: ICD-10-CM

## 2020-02-25 DIAGNOSIS — Z85.46 HISTORY OF PROSTATE CANCER: ICD-10-CM

## 2020-02-25 DIAGNOSIS — B09 VIRAL EXANTHEM: ICD-10-CM

## 2020-02-25 DIAGNOSIS — E03.9 ACQUIRED HYPOTHYROIDISM: ICD-10-CM

## 2020-02-25 DIAGNOSIS — E86.0 DEHYDRATION: ICD-10-CM

## 2020-02-25 DIAGNOSIS — E78.5 HYPERLIPIDEMIA, UNSPECIFIED HYPERLIPIDEMIA TYPE: ICD-10-CM

## 2020-02-25 PROCEDURE — 99214 OFFICE O/P EST MOD 30 MIN: CPT | Performed by: FAMILY MEDICINE

## 2020-02-25 RX ORDER — CETIRIZINE HYDROCHLORIDE 10 MG/1
10 TABLET ORAL DAILY
COMMUNITY

## 2020-02-25 SDOH — HEALTH STABILITY: MENTAL HEALTH: HOW OFTEN DO YOU HAVE A DRINK CONTAINING ALCOHOL?: NEVER

## 2020-03-07 ENCOUNTER — OFFICE VISIT (OUTPATIENT)
Dept: FAMILY MEDICINE | Age: 80
End: 2020-03-07

## 2020-03-07 ENCOUNTER — TELEPHONE (OUTPATIENT)
Dept: FAMILY MEDICINE | Age: 80
End: 2020-03-07

## 2020-03-07 ENCOUNTER — LAB SERVICES (OUTPATIENT)
Dept: LAB | Age: 80
End: 2020-03-07

## 2020-03-07 VITALS
TEMPERATURE: 96.5 F | WEIGHT: 143 LBS | SYSTOLIC BLOOD PRESSURE: 138 MMHG | HEART RATE: 98 BPM | BODY MASS INDEX: 23.08 KG/M2 | DIASTOLIC BLOOD PRESSURE: 80 MMHG | OXYGEN SATURATION: 98 %

## 2020-03-07 DIAGNOSIS — R41.0 CONFUSION: ICD-10-CM

## 2020-03-07 DIAGNOSIS — J01.90 ACUTE SINUSITIS, RECURRENCE NOT SPECIFIED, UNSPECIFIED LOCATION: Primary | ICD-10-CM

## 2020-03-07 DIAGNOSIS — R21 RASH: ICD-10-CM

## 2020-03-07 LAB
BILIRUBIN URINE: NEGATIVE
BLOOD URINE: NEGATIVE
CLARITY: CLEAR
COLOR: YELLOW
GLUCOSE QUALITATIVE U: NEGATIVE
KETONES, URINE: NEGATIVE
LEUKOCYTE ESTERASE URINE: NEGATIVE
MUCOUS: NORMAL
NITRITE URINE: NEGATIVE
PH URINE: 6.5 (ref 5–7)
RED BLOOD CELLS URINE: NORMAL (ref 0–3)
SPECIFIC GRAVITY URINE: 1.02 (ref 1–1.03)
SQUAMOUS EPITHELIAL CELLS: 0
URINE PROTEIN, QUAL (DIPSTICK): NEGATIVE
UROBILINOGEN URINE: NORMAL
WHITE BLOOD CELLS URINE: NORMAL (ref 0–5)

## 2020-03-07 PROCEDURE — 99214 OFFICE O/P EST MOD 30 MIN: CPT | Performed by: PHYSICIAN ASSISTANT

## 2020-03-07 PROCEDURE — 87086 URINE CULTURE/COLONY COUNT: CPT | Performed by: PHYSICIAN ASSISTANT

## 2020-03-07 PROCEDURE — 81001 URINALYSIS AUTO W/SCOPE: CPT | Performed by: PHYSICIAN ASSISTANT

## 2020-03-07 RX ORDER — DOXYCYCLINE HYCLATE 100 MG/1
100 CAPSULE ORAL 2 TIMES DAILY
Qty: 20 CAPSULE | Refills: 0 | Status: SHIPPED | OUTPATIENT
Start: 2020-03-07 | End: 2020-03-17

## 2020-03-07 SDOH — HEALTH STABILITY: MENTAL HEALTH: HOW OFTEN DO YOU HAVE A DRINK CONTAINING ALCOHOL?: NEVER

## 2020-03-07 ASSESSMENT — ENCOUNTER SYMPTOMS
FEVER: 0
RHINORRHEA: 0
FATIGUE: 0
RESPIRATORY NEGATIVE: 1
SORE THROAT: 0
CHILLS: 0
SINUS PRESSURE: 0
EYE REDNESS: 0
CONSTITUTIONAL NEGATIVE: 1
EYES NEGATIVE: 1
COUGH: 0

## 2020-03-09 LAB — FINAL REPORT: NORMAL

## 2020-03-10 ENCOUNTER — LAB SERVICES (OUTPATIENT)
Dept: LAB | Age: 80
End: 2020-03-10

## 2020-03-10 ENCOUNTER — OFFICE VISIT (OUTPATIENT)
Dept: ALLERGY | Age: 80
End: 2020-03-10
Attending: PHYSICIAN ASSISTANT

## 2020-03-10 VITALS
DIASTOLIC BLOOD PRESSURE: 60 MMHG | HEIGHT: 63 IN | HEART RATE: 81 BPM | BODY MASS INDEX: 24.77 KG/M2 | SYSTOLIC BLOOD PRESSURE: 112 MMHG | TEMPERATURE: 99.5 F | WEIGHT: 139.8 LBS

## 2020-03-10 DIAGNOSIS — H10.10 ALLERGIC RHINOCONJUNCTIVITIS: ICD-10-CM

## 2020-03-10 DIAGNOSIS — J30.9 ALLERGIC RHINOCONJUNCTIVITIS: ICD-10-CM

## 2020-03-10 DIAGNOSIS — B99.9 RECURRENT INFECTIONS: ICD-10-CM

## 2020-03-10 DIAGNOSIS — L50.9 URTICARIA, UNSPECIFIED: ICD-10-CM

## 2020-03-10 DIAGNOSIS — J98.01 BRONCHOSPASM: Primary | ICD-10-CM

## 2020-03-10 DIAGNOSIS — R63.4 WEIGHT LOSS: ICD-10-CM

## 2020-03-10 LAB
ALBUMIN SERPL BCG-MCNC: 3.4 G/DL (ref 3.6–5.1)
ALP SERPL-CCNC: 115 U/L (ref 45–115)
ALT SERPL W/O P-5'-P-CCNC: 18 U/L (ref 10–35)
AST SERPL-CCNC: 18 U/L (ref 9–37)
BILIRUB SERPL-MCNC: 0.7 MG/DL (ref 0–1)
BUN SERPL-MCNC: 15 MG/DL (ref 6–27)
CALCIUM SERPL-MCNC: 9.5 MG/DL (ref 8.6–10.6)
CHLORIDE SERPL-SCNC: 105 MMOL/L (ref 96–107)
CREAT SERPL-MCNC: 1.1 MG/DL (ref 0.6–1.6)
GFR SERPL CREATININE-BSD FRML MDRD: >60 ML/MIN/{1.73M2}
GFR SERPL CREATININE-BSD FRML MDRD: >60 ML/MIN/{1.73M2}
GLUCOSE SERPL-MCNC: 103 MG/DL (ref 70–200)
HCO3 SERPL-SCNC: 27 MMOL/L (ref 22–32)
POTASSIUM SERPL-SCNC: 4.2 MMOL/L (ref 3.5–5.3)
PROT SERPL-MCNC: 6.5 G/DL (ref 6.2–8.1)
SEDIMENTATION RATE, RBC: 11 MM/H (ref 0–10)
SODIUM SERPL-SCNC: 141 MMOL/L (ref 136–146)

## 2020-03-10 PROCEDURE — 80053 COMPREHEN METABOLIC PANEL: CPT | Performed by: ALLERGY & IMMUNOLOGY

## 2020-03-10 PROCEDURE — 85027 COMPLETE CBC AUTOMATED: CPT | Performed by: ALLERGY & IMMUNOLOGY

## 2020-03-10 PROCEDURE — 36415 COLL VENOUS BLD VENIPUNCTURE: CPT | Performed by: ALLERGY & IMMUNOLOGY

## 2020-03-10 PROCEDURE — 86038 ANTINUCLEAR ANTIBODIES: CPT | Performed by: ALLERGY & IMMUNOLOGY

## 2020-03-10 PROCEDURE — 94010 BREATHING CAPACITY TEST: CPT | Performed by: ALLERGY & IMMUNOLOGY

## 2020-03-10 PROCEDURE — 85652 RBC SED RATE AUTOMATED: CPT | Performed by: ALLERGY & IMMUNOLOGY

## 2020-03-10 PROCEDURE — 82785 ASSAY OF IGE: CPT | Performed by: ALLERGY & IMMUNOLOGY

## 2020-03-10 PROCEDURE — 85007 BL SMEAR W/DIFF WBC COUNT: CPT | Performed by: ALLERGY & IMMUNOLOGY

## 2020-03-10 PROCEDURE — 99205 OFFICE O/P NEW HI 60 MIN: CPT | Performed by: ALLERGY & IMMUNOLOGY

## 2020-03-10 PROCEDURE — 86225 DNA ANTIBODY NATIVE: CPT | Performed by: ALLERGY & IMMUNOLOGY

## 2020-03-10 PROCEDURE — 86003 ALLG SPEC IGE CRUDE XTRC EA: CPT | Performed by: ALLERGY & IMMUNOLOGY

## 2020-03-10 PROCEDURE — 82784 ASSAY IGA/IGD/IGG/IGM EACH: CPT | Performed by: ALLERGY & IMMUNOLOGY

## 2020-03-10 RX ORDER — ALBUTEROL SULFATE 90 UG/1
2 AEROSOL, METERED RESPIRATORY (INHALATION) EVERY 4 HOURS PRN
Qty: 1 INHALER | Refills: 0 | Status: CANCELLED | OUTPATIENT
Start: 2020-03-10

## 2020-03-10 ASSESSMENT — ENCOUNTER SYMPTOMS
VOMITING: 0
ABDOMINAL PAIN: 0
FEVER: 1
CHEST TIGHTNESS: 0
COUGH: 1
DIARRHEA: 0
APPETITE CHANGE: 1
NERVOUS/ANXIOUS: 0
FACIAL SWELLING: 0
HEADACHES: 0
SORE THROAT: 0
WHEEZING: 0
SLEEP DISTURBANCE: 0
ADENOPATHY: 0

## 2020-03-11 LAB
ATYPICAL LYMPH: 3 (ref 0–4)
DIFFERENTIAL TYPE: ABNORMAL
EOSINOPHIL % MD: 13 % (ref 0–10)
EOSINOPHIL ABSOLUTE # MD: 0.9 /UL (ref 0–0.5)
HEMATOCRIT: 43.8 % (ref 40–51)
HEMOGLOBIN: 13.9 G/DL (ref 13.7–17.5)
IGA SERPL-MCNC: 173 MG/DL (ref 70–400)
IGG SERPL-MCNC: 1273 MG/DL (ref 700–1600)
IGM SERPL-MCNC: 603 MG/DL (ref 40–230)
LYMPH PERCENT MD: 16 % (ref 20.5–51.1)
LYMPHOCYTE ABSOLUTE # MD: 1.3 /UL (ref 1.2–3.4)
MEAN CORPUSCULAR HGB CONCENTRATION: 31.7 % (ref 32–36)
MEAN CORPUSCULAR HGB: 32.8 PG (ref 27–34)
MEAN CORPUSCULAR VOLUME: 103.3 FL (ref 79–95)
MEAN PLATELET VOLUME: 10.4 FL (ref 8.6–12.4)
MONOCYTE ABSOLUTE # MD: 0.5 /UL (ref 0.2–0.9)
MONOCYTE PERCENT MD: 8 % (ref 4.3–12.9)
NEUTROPHIL ABSOLUTE # MD: 4 /UL (ref 1.4–6.5)
NEUTROPHIL PERCENT MD: 60 % (ref 34–73.5)
PLATELET COUNT: 265 10*3/UL (ref 150–400)
RED BLOOD CELL COUNT: 4.24 10*6/UL (ref 3.9–5.7)
RED CELL DISTRIBUTION WIDTH: 13.4 % (ref 11.3–14.8)
WHITE BLOOD CELL COUNT: 6.7 10*3/UL (ref 4–10)

## 2020-03-12 LAB
A ALTERNATA IGE QN: 0.19 KU/L (ref 0–0.1)
A FUMIGATUS IGE QN: <0.1 KU/L (ref 0–0.1)
A NIGER IGE QN: <0.1 KU/L (ref 0–0.1)
A PULLULANS IGE QN: <0.1 KU/L (ref 0–0.1)
AMER BEECH IGE QN: <0.1 KU/L (ref 0–0.1)
ANA SER QL IA: ABNORMAL RATIO (ref 0–0.6)
BERMUDA GRASS IGE QN: <0.1 KU/L (ref 0–0.1)
BOXELDER IGE QN: <0.1 KU/L (ref 0–0.1)
C HERBARUM IGE QN: <0.1 KU/L (ref 0–0.1)
CALIF WALNUT POLN IGE QN: <0.1 KU/L (ref 0–0.1)
CAT DANDER IGE QN: 7.49 KU/L (ref 0–0.1)
COCKSFOOT IGE QN: <0.1 KU/L (ref 0–0.1)
COMMON RAGWEED IGE QN: 0.31 KU/L (ref 0–0.1)
D FARINAE IGE QN: <0.1 KU/L (ref 0–0.1)
D PTERONYSS IGE QN: <0.1 KU/L (ref 0–0.1)
DOG DANDER IGE QN: 0.63 KU/L (ref 0–0.1)
DSDNA IGG SERPL IA-ACNC: ABNORMAL [IU]/ML (ref 0–10)
E PURPURASCENS IGE QN: <0.1 KU/L (ref 0–0.1)
ENGL PLANTAIN IGE QN: <0.1 KU/L (ref 0–0.1)
F MONILIFORME IGE QN: <0.1 KU/L (ref 0–0.1)
GIANT RAGWEED IGE QN: 0.17 KU/L (ref 0–0.1)
JOHNSON GRASS IGE QN: <0.1 KU/L (ref 0–0.1)
KENT BLUE GRASS IGE QN: <0.1 KU/L (ref 0–0.1)
LONDON PLANE IGE QN: <0.1 KU/L (ref 0–0.1)
M RACEMOSUS IGE QN: <0.1 KU/L (ref 0–0.1)
MUGWORT IGE QN: <0.1 KU/L (ref 0–0.1)
P BETAE IGE QN: <0.1 KU/L (ref 0–0.1)
P NOTATUM IGE QN: <0.1 KU/L (ref 0–0.1)
PECAN/HICK TREE IGE QN: <0.1 KU/L (ref 0–0.1)
PER RYE GRASS IGE QN: <0.1 KU/L (ref 0–0.1)
RED CEDAR IGE QN: <0.1 KU/L (ref 0–0.1)
RED TOP GRASS IGE QN: <0.1 KU/L (ref 0–0.1)
ROACH IGE QN: <0.1 KU/L (ref 0–0.1)
S ROSTRATA IGE QN: <0.1 KU/L (ref 0–0.1)
SALTWORT IGE QN: 0.15 KU/L (ref 0–0.1)
SILVER BIRCH IGE QN: <0.1 KU/L (ref 0–0.1)
TIMOTHY IGE QN: <0.1 KU/L (ref 0–0.1)
TOTAL IGE SMQN RAST: 329 KU/L (ref 0–100)
WHITE ASH IGE QN: <0.1 KU/L (ref 0–0.1)
WHITE ELM IGE QN: <0.1 KU/L (ref 0–0.1)
WHITE OAK IGE QN: <0.1 KU/L (ref 0–0.1)
WILLOW IGE QN: <0.1 KU/L (ref 0–0.1)

## 2020-03-13 LAB — C TETANI TOXOID IGG SERPL IA-ACNC: 1.4

## 2020-03-14 LAB
DEPRECATED S PNEUM 1 IGG SER-MCNC: 18.4 UG/ML
DEPRECATED S PNEUM12 IGG SER-MCNC: 0.47 UG/ML
DEPRECATED S PNEUM14 IGG SER-MCNC: 0.82 UG/ML
DEPRECATED S PNEUM19 IGG SER-MCNC: 0.36 UG/ML
DEPRECATED S PNEUM23 IGG SER-MCNC: 0.47 UG/ML
DEPRECATED S PNEUM3 IGG SER-MCNC: 0.23 UG/ML
DEPRECATED S PNEUM4 IGG SER-MCNC: 1.77 UG/ML
DEPRECATED S PNEUM5 IGG SER-MCNC: 4.73 UG/ML
DEPRECATED S PNEUM8 IGG SER-MCNC: 1.35 UG/ML
DEPRECATED S PNEUM9 IGG SER-MCNC: 2.16 UG/ML
S PNEUM DA 18C IGG SER-MCNC: 0.43 UG/ML
S PNEUM DA 6B IGG SER-MCNC: 2.36 UG/ML
S PNEUM DA 7F IGG SER-MCNC: 3.2 UG/ML
S PNEUM DA 9V IGG SER-MCNC: 1.86 UG/ML
S PNEUM SEROTYPE IGG SER-IMP: NORMAL

## 2020-03-16 ENCOUNTER — TELEPHONE (OUTPATIENT)
Dept: ALLERGY | Age: 80
End: 2020-03-16

## 2020-03-16 DIAGNOSIS — D72.10 EOSINOPHILIA: Primary | ICD-10-CM

## 2020-03-16 DIAGNOSIS — R50.9 FEVER, UNSPECIFIED FEVER CAUSE: ICD-10-CM

## 2020-03-16 LAB
ANA PAT SER IF-IMP: NORMAL
ANA TITR SER IF: NORMAL {TITER}
APPEARANCE SPEC: NORMAL

## 2020-03-17 ENCOUNTER — TELEPHONE (OUTPATIENT)
Dept: FAMILY MEDICINE | Age: 80
End: 2020-03-17

## 2020-03-18 RX ORDER — LOSARTAN POTASSIUM 100 MG/1
100 TABLET ORAL DAILY
Qty: 90 TABLET | Refills: 3 | Status: SHIPPED | OUTPATIENT
Start: 2020-03-18 | End: 2020-03-31 | Stop reason: ALTCHOICE

## 2020-03-18 RX ORDER — AMLODIPINE BESYLATE 5 MG/1
5 TABLET ORAL DAILY
Qty: 90 TABLET | Refills: 3 | OUTPATIENT
Start: 2020-03-18

## 2020-03-23 ENCOUNTER — TELEPHONE (OUTPATIENT)
Dept: FAMILY MEDICINE | Age: 80
End: 2020-03-23

## 2020-03-23 ENCOUNTER — LAB SERVICES (OUTPATIENT)
Dept: LAB | Age: 80
End: 2020-03-23

## 2020-03-23 DIAGNOSIS — J01.90 ACUTE SINUSITIS, RECURRENCE NOT SPECIFIED, UNSPECIFIED LOCATION: ICD-10-CM

## 2020-03-23 RX ORDER — DOXYCYCLINE HYCLATE 100 MG/1
CAPSULE ORAL
Qty: 20 CAPSULE | Refills: 0 | OUTPATIENT
Start: 2020-03-23

## 2020-03-25 ENCOUNTER — LAB SERVICES (OUTPATIENT)
Dept: LAB | Age: 80
End: 2020-03-25

## 2020-03-25 DIAGNOSIS — R50.9 FEVER, UNSPECIFIED FEVER CAUSE: ICD-10-CM

## 2020-03-25 DIAGNOSIS — D72.10 EOSINOPHILIA: ICD-10-CM

## 2020-03-26 LAB — APPEARANCE SPEC: NORMAL

## 2020-03-27 ENCOUNTER — LAB REQUISITION (OUTPATIENT)
Dept: LAB | Age: 80
End: 2020-03-27

## 2020-03-28 ENCOUNTER — TELEPHONE (OUTPATIENT)
Dept: FAMILY MEDICINE | Age: 80
End: 2020-03-28

## 2020-03-28 ENCOUNTER — LAB SERVICES (OUTPATIENT)
Dept: LAB | Age: 80
End: 2020-03-28

## 2020-03-28 ENCOUNTER — OFFICE VISIT (OUTPATIENT)
Dept: FAMILY MEDICINE | Age: 80
End: 2020-03-28

## 2020-03-28 VITALS
BODY MASS INDEX: 24.36 KG/M2 | OXYGEN SATURATION: 96 % | DIASTOLIC BLOOD PRESSURE: 60 MMHG | WEIGHT: 137.5 LBS | TEMPERATURE: 97.7 F | SYSTOLIC BLOOD PRESSURE: 122 MMHG | HEART RATE: 85 BPM

## 2020-03-28 DIAGNOSIS — R63.0 LOSS OF APPETITE: ICD-10-CM

## 2020-03-28 DIAGNOSIS — E78.5 HYPERLIPIDEMIA, UNSPECIFIED HYPERLIPIDEMIA TYPE: ICD-10-CM

## 2020-03-28 DIAGNOSIS — N17.9 AKI (ACUTE KIDNEY INJURY) (CMD): ICD-10-CM

## 2020-03-28 DIAGNOSIS — R44.3 HALLUCINATIONS: Primary | ICD-10-CM

## 2020-03-28 DIAGNOSIS — R44.3 HALLUCINATIONS: ICD-10-CM

## 2020-03-28 DIAGNOSIS — R05.9 COUGH: ICD-10-CM

## 2020-03-28 DIAGNOSIS — I10 ESSENTIAL HYPERTENSION: ICD-10-CM

## 2020-03-28 DIAGNOSIS — R41.0 CONFUSION: ICD-10-CM

## 2020-03-28 DIAGNOSIS — Z85.46 HISTORY OF PROSTATE CANCER: ICD-10-CM

## 2020-03-28 DIAGNOSIS — F32.9 CURRENT EPISODE OF MAJOR DEPRESSIVE DISORDER WITHOUT PRIOR EPISODE, UNSPECIFIED DEPRESSION EPISODE SEVERITY: ICD-10-CM

## 2020-03-28 DIAGNOSIS — E03.9 ACQUIRED HYPOTHYROIDISM: ICD-10-CM

## 2020-03-28 DIAGNOSIS — R50.9 FEVER, UNSPECIFIED FEVER CAUSE: Primary | ICD-10-CM

## 2020-03-28 DIAGNOSIS — R44.3 HALLUCINATION: ICD-10-CM

## 2020-03-28 LAB
ALBUMIN SERPL-MCNC: 3.4 G/DL (ref 3.6–5.1)
ALP SERPL-CCNC: 85 U/L (ref 45–115)
ALT SERPL W/O P-5'-P-CCNC: 13 U/L (ref 5–49)
AST SERPL-CCNC: 24 U/L (ref 14–43)
BILIRUB SERPL-MCNC: 0.8 MG/DL (ref 0–1.3)
BILIRUBIN URINE: NEGATIVE
BLOOD URINE: NEGATIVE
BUN SERPL-MCNC: 26 MG/DL (ref 6–27)
CALCIUM SERPL-MCNC: 11.6 MG/DL (ref 8.6–10.6)
CHLORIDE SERPL-SCNC: 102 MMOL/L (ref 96–107)
CLARITY: NORMAL
CO2 SERPL-SCNC: 31 MMOL/L (ref 22–32)
COLOR: YELLOW
CREAT SERPL-MCNC: 1.5 MG/DL (ref 0.6–1.6)
DIFFERENTIAL TYPE: ABNORMAL
GFR SERPL CREATININE-BSD FRML MDRD: 45 ML/MIN/{1.73M2}
GFR SERPL CREATININE-BSD FRML MDRD: 55 ML/MIN/{1.73M2}
GLUCOSE QUALITATIVE U: NEGATIVE
GLUCOSE SERPL-MCNC: 98 MG/DL (ref 70–200)
HEMATOCRIT: 40 % (ref 40–51)
HEMOGLOBIN: 12.9 G/DL (ref 13.7–17.5)
KETONES, URINE: NEGATIVE
LEUKOCYTE ESTERASE URINE: NEGATIVE
MEAN CORPUSCULAR HGB CONCENTRATION: 32.3 % (ref 32–36)
MEAN CORPUSCULAR HGB: 31.9 PG (ref 27–34)
MEAN CORPUSCULAR VOLUME: 99 FL (ref 79–95)
MEAN PLATELET VOLUME: 10.7 FL (ref 8.6–12.4)
MUCOUS: ABNORMAL
NITRITE URINE: NEGATIVE
PH URINE: 5 (ref 5–7)
PLATELET COUNT: 188 10*3/UL (ref 150–400)
POTASSIUM SERPL-SCNC: 4 MMOL/L (ref 3.5–5.3)
PROT SERPL-MCNC: 7.2 G/DL (ref 6.4–8.5)
RED BLOOD CELL COUNT: 4.04 10*6/UL (ref 3.9–5.7)
RED BLOOD CELLS URINE: ABNORMAL (ref 0–3)
RED CELL DISTRIBUTION WIDTH: 13.8 % (ref 11.3–14.8)
SODIUM SERPL-SCNC: 138 MMOL/L (ref 136–146)
SPECIFIC GRAVITY URINE: 1.02 (ref 1–1.03)
SQUAMOUS EPITHELIAL CELLS: 0
T4 FREE SERPL-MCNC: 1.21 NG/DL (ref 0.78–2.19)
TSH SERPL DL<=0.05 MIU/L-ACNC: 9.16 M[IU]/L (ref 0.3–4.82)
U CALCIUM OXALATE CRYSTALS: ABNORMAL
URINE PROTEIN, QUAL (DIPSTICK): NEGATIVE
UROBILINOGEN URINE: <2
VIT B12 SERPL-MCNC: 423 PG/ML (ref 193–982)
WHITE BLOOD CELL COUNT: 7.3 10*3/UL (ref 4–10)
WHITE BLOOD CELLS URINE: ABNORMAL (ref 0–5)

## 2020-03-28 PROCEDURE — 36415 COLL VENOUS BLD VENIPUNCTURE: CPT | Performed by: FAMILY MEDICINE

## 2020-03-28 PROCEDURE — 85007 BL SMEAR W/DIFF WBC COUNT: CPT | Performed by: FAMILY MEDICINE

## 2020-03-28 PROCEDURE — 84439 ASSAY OF FREE THYROXINE: CPT | Performed by: FAMILY MEDICINE

## 2020-03-28 PROCEDURE — 85027 COMPLETE CBC AUTOMATED: CPT | Performed by: FAMILY MEDICINE

## 2020-03-28 PROCEDURE — 84443 ASSAY THYROID STIM HORMONE: CPT | Performed by: FAMILY MEDICINE

## 2020-03-28 PROCEDURE — 80053 COMPREHEN METABOLIC PANEL: CPT | Performed by: FAMILY MEDICINE

## 2020-03-28 PROCEDURE — 99215 OFFICE O/P EST HI 40 MIN: CPT | Performed by: FAMILY MEDICINE

## 2020-03-28 PROCEDURE — 82607 VITAMIN B-12: CPT | Performed by: FAMILY MEDICINE

## 2020-03-28 PROCEDURE — 81003 URINALYSIS AUTO W/O SCOPE: CPT | Performed by: FAMILY MEDICINE

## 2020-03-28 PROCEDURE — 85060 BLOOD SMEAR INTERPRETATION: CPT | Performed by: FAMILY MEDICINE

## 2020-03-28 RX ORDER — ESCITALOPRAM OXALATE 5 MG/1
5 TABLET ORAL DAILY
Qty: 30 TABLET | Refills: 2 | Status: SHIPPED | OUTPATIENT
Start: 2020-03-28 | End: 2020-07-06

## 2020-03-29 LAB — FOLATE RBC-MCNC: 769 NG/ML (ref 280–791)

## 2020-03-30 LAB
ANISOCYTOSIS: ABNORMAL
ATYPICAL LYMPH: 10 (ref 0–4)
BANDS: 2 % (ref 0–5)
BASOPHIL % MD: 1 % (ref 0–1.2)
BASOPHIL ABSOLUTE # MD: 0.1 /UL (ref 0–0.1)
CBC PATH REVIEW: NORMAL
EOSINOPHIL % MD: 6 % (ref 0–10)
EOSINOPHIL ABSOLUTE # MD: 0.4 /UL (ref 0–0.5)
GIANT PLATELETS: ABNORMAL
LYMPH PERCENT MD: 20 % (ref 20.5–51.1)
LYMPHOCYTE ABSOLUTE # MD: 2.2 /UL (ref 1.2–3.4)
MONOCYTE ABSOLUTE # MD: 0.9 /UL (ref 0.2–0.9)
MONOCYTE PERCENT MD: 12 % (ref 4.3–12.9)
NEUTROPHIL ABSOLUTE # MD: 3.7 /UL (ref 1.4–6.5)
NEUTROPHIL PERCENT MD: 49 % (ref 34–73.5)
POLYCHROMASIA: ABNORMAL

## 2020-03-31 ENCOUNTER — TELEPHONE (OUTPATIENT)
Dept: FAMILY MEDICINE | Age: 80
End: 2020-03-31

## 2020-03-31 DIAGNOSIS — N17.9 AKI (ACUTE KIDNEY INJURY) (CMD): Primary | ICD-10-CM

## 2020-03-31 DIAGNOSIS — E03.9 ACQUIRED HYPOTHYROIDISM: ICD-10-CM
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
## 2020-03-31 LAB
ADV 40+41 FIB PROT STL QL NAA+PROBE: NOT DETECTED
ADV 40+41 FIB PROT STL QL NAA+PROBE: NOT DETECTED
C COLI+JEJ+LAR 16S RRNA STL QL NAA+PROBE: NOT DETECTED
C COLI+JEJ+LAR 16S RRNA STL QL NAA+PROBE: NOT DETECTED
C PARVUM+HOMINIS COWP STL QL NAA+PROBE: NOT DETECTED
C PARVUM+HOMINIS COWP STL QL NAA+PROBE: NOT DETECTED
E HISTOLYTICA 18S RRNA STL QL NAA+PROBE: NOT DETECTED
E HISTOLYTICA 18S RRNA STL QL NAA+PROBE: NOT DETECTED
EC O157 RFBE GENE STL QL NAA+PROBE: NOT DETECTED
EC O157 RFBE GENE STL QL NAA+PROBE: NOT DETECTED
EC STX1 GENE STL QL NAA+PROBE: NOT DETECTED
EC STX1 GENE STL QL NAA+PROBE: NOT DETECTED
EC STX2 GENE STL QL NAA+PROBE: NOT DETECTED
EC STX2 GENE STL QL NAA+PROBE: NOT DETECTED
ETEC ELTA+ESTB GENES STL QL NAA+PROBE: NOT DETECTED
ETEC ELTA+ESTB GENES STL QL NAA+PROBE: NOT DETECTED
G LAMBLIA 18S RRNA STL QL NAA+PROBE: NOT DETECTED
G LAMBLIA 18S RRNA STL QL NAA+PROBE: NOT DETECTED
NOROVIRUS GI RNA STL QL NAA+PROBE: NOT DETECTED
NOROVIRUS GII RNA STL QL NAA+PROBE: NOT DETECTED
RVA VP6 STL QL NAA+PROBE: NOT DETECTED
RVA VP6 STL QL NAA+PROBE: NOT DETECTED
SALMONELLA SP INVA+FLIC STL QL NAA+PROBE: NOT DETECTED
SALMONELLA SP INVA+FLIC STL QL NAA+PROBE: NOT DETECTED
SERVICE CMNT-IMP: NORMAL
SHIGELLA SP+EIEC IPAH STL QL NAA+PROBE: NOT DETECTED
SHIGELLA SP+EIEC IPAH STL QL NAA+PROBE: NOT DETECTED
VIBRIO CHOL TOXIN CTXA STL QL NAA+PROBE: NOT DETECTED
VIBRIO CHOL TOXIN CTXA STL QL NAA+PROBE: NOT DETECTED

## 2020-03-31 RX ORDER — AMLODIPINE BESYLATE 5 MG/1
2.5 TABLET ORAL DAILY
Qty: 90 TABLET | Refills: 3 | Status: SHIPPED | COMMUNITY
Start: 2020-03-31 | End: 2020-04-28 | Stop reason: SDUPTHER

## 2020-04-02 ENCOUNTER — TELEPHONE (OUTPATIENT)
Dept: FAMILY MEDICINE | Age: 80
End: 2020-04-02

## 2020-04-07 ENCOUNTER — TELEPHONE (OUTPATIENT)
Dept: ALLERGY | Age: 80
End: 2020-04-07

## 2020-04-07 DIAGNOSIS — J98.01 BRONCHOSPASM: Primary | ICD-10-CM

## 2020-04-07 RX ORDER — FLUTICASONE FUROATE AND VILANTEROL 100; 25 UG/1; UG/1
1 POWDER RESPIRATORY (INHALATION) DAILY
Qty: 1 EACH | Refills: 5 | Status: SHIPPED | OUTPATIENT
Start: 2020-04-07 | End: 2020-04-09 | Stop reason: ALTCHOICE

## 2020-04-09 ENCOUNTER — TELEPHONE (OUTPATIENT)
Dept: FAMILY MEDICINE | Age: 80
End: 2020-04-09

## 2020-04-09 DIAGNOSIS — R41.0 CONFUSION: Primary | ICD-10-CM

## 2020-04-09 DIAGNOSIS — R44.3 HALLUCINATION: ICD-10-CM

## 2020-04-10 RX ORDER — FLUTICASONE PROPIONATE 110 UG/1
2 AEROSOL, METERED RESPIRATORY (INHALATION) 2 TIMES DAILY
Qty: 1 INHALER | Refills: 5 | Status: SHIPPED | OUTPATIENT
Start: 2020-04-10 | End: 2020-04-10

## 2020-04-13 ENCOUNTER — TELEPHONE (OUTPATIENT)
Dept: FAMILY MEDICINE | Age: 80
End: 2020-04-13

## 2020-04-13 RX ORDER — ATORVASTATIN CALCIUM 10 MG/1
TABLET, FILM COATED ORAL
Qty: 90 TABLET | Refills: 0 | Status: SHIPPED | OUTPATIENT
Start: 2020-04-13 | End: 2020-06-03 | Stop reason: DRUGHIGH

## 2020-04-14 ENCOUNTER — TELEPHONE (OUTPATIENT)
Dept: FAMILY MEDICINE | Age: 80
End: 2020-04-14

## 2020-04-14 ENCOUNTER — V-VISIT (OUTPATIENT)
Dept: FAMILY MEDICINE | Age: 80
End: 2020-04-14

## 2020-04-14 ENCOUNTER — LAB SERVICES (OUTPATIENT)
Dept: LAB | Age: 80
End: 2020-04-14

## 2020-04-14 ENCOUNTER — APPOINTMENT (OUTPATIENT)
Dept: FAMILY MEDICINE | Age: 80
End: 2020-04-14

## 2020-04-14 DIAGNOSIS — F32.0 CURRENT MILD EPISODE OF MAJOR DEPRESSIVE DISORDER, UNSPECIFIED WHETHER RECURRENT (CMD): ICD-10-CM

## 2020-04-14 DIAGNOSIS — L85.8 CUTANEOUS HORN: ICD-10-CM

## 2020-04-14 DIAGNOSIS — R41.0 CONFUSION: Primary | ICD-10-CM

## 2020-04-14 DIAGNOSIS — I10 ESSENTIAL HYPERTENSION: ICD-10-CM

## 2020-04-14 DIAGNOSIS — Z85.46 HISTORY OF PROSTATE CANCER: ICD-10-CM

## 2020-04-14 DIAGNOSIS — R41.89 COGNITIVE CHANGE: ICD-10-CM

## 2020-04-14 DIAGNOSIS — R31.9 HEMATURIA, UNSPECIFIED TYPE: ICD-10-CM

## 2020-04-14 DIAGNOSIS — N17.9 AKI (ACUTE KIDNEY INJURY) (CMD): ICD-10-CM

## 2020-04-14 DIAGNOSIS — R59.1 LYMPHADENOPATHY: ICD-10-CM

## 2020-04-14 DIAGNOSIS — L57.0 ACTINIC KERATOSES: ICD-10-CM
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
## 2020-04-14 DIAGNOSIS — E78.5 HYPERLIPIDEMIA, UNSPECIFIED HYPERLIPIDEMIA TYPE: ICD-10-CM

## 2020-04-14 DIAGNOSIS — E03.9 ACQUIRED HYPOTHYROIDISM: ICD-10-CM

## 2020-04-14 LAB
BUN SERPL-MCNC: 19 MG/DL (ref 6–27)
CALCIUM SERPL-MCNC: 9.9 MG/DL (ref 8.6–10.6)
CHLORIDE SERPL-SCNC: 102 MMOL/L (ref 96–107)
CO2 SERPL-SCNC: 28 MMOL/L (ref 22–32)
CREAT SERPL-MCNC: 1 MG/DL (ref 0.6–1.6)
GFR SERPL CREATININE-BSD FRML MDRD: >60 ML/MIN/{1.73M2}
GFR SERPL CREATININE-BSD FRML MDRD: >60 ML/MIN/{1.73M2}
GLUCOSE SERPL-MCNC: 158 MG/DL (ref 70–200)
POTASSIUM SERPL-SCNC: 4.2 MMOL/L (ref 3.5–5.3)
SODIUM SERPL-SCNC: 137 MMOL/L (ref 136–146)

## 2020-04-14 PROCEDURE — 36415 COLL VENOUS BLD VENIPUNCTURE: CPT | Performed by: FAMILY MEDICINE

## 2020-04-14 PROCEDURE — 99215 OFFICE O/P EST HI 40 MIN: CPT | Performed by: FAMILY MEDICINE

## 2020-04-14 PROCEDURE — 80048 BASIC METABOLIC PNL TOTAL CA: CPT | Performed by: FAMILY MEDICINE

## 2020-04-15 DIAGNOSIS — R31.9 HEMATURIA, UNSPECIFIED TYPE: Primary | ICD-10-CM

## 2020-04-15 LAB — B BURGDOR IGG+IGM SER QL: NEGATIVE

## 2020-04-22 ENCOUNTER — TELEPHONE (OUTPATIENT)
Dept: FAMILY MEDICINE | Age: 80
End: 2020-04-22

## 2020-04-28 ENCOUNTER — E-ADVICE (OUTPATIENT)
Dept: ALLERGY | Age: 80
End: 2020-04-28

## 2020-04-28 ENCOUNTER — V-VISIT (OUTPATIENT)
Dept: ALLERGY | Age: 80
End: 2020-04-28

## 2020-04-28 ENCOUNTER — TELEPHONE (OUTPATIENT)
Dept: FAMILY MEDICINE | Age: 80
End: 2020-04-28

## 2020-04-28 VITALS
SYSTOLIC BLOOD PRESSURE: 137 MMHG | TEMPERATURE: 98.5 F | WEIGHT: 137 LBS | DIASTOLIC BLOOD PRESSURE: 70 MMHG | HEIGHT: 63 IN | BODY MASS INDEX: 24.27 KG/M2

## 2020-04-28 DIAGNOSIS — B99.9 RECURRENT INFECTIONS: Primary | ICD-10-CM

## 2020-04-28 DIAGNOSIS — J30.9 ALLERGIC RHINOCONJUNCTIVITIS: ICD-10-CM

## 2020-04-28 DIAGNOSIS — E55.9 VITAMIN D DEFICIENCY, UNSPECIFIED: ICD-10-CM

## 2020-04-28 DIAGNOSIS — H10.10 ALLERGIC RHINOCONJUNCTIVITIS: ICD-10-CM

## 2020-04-28 DIAGNOSIS — J98.01 BRONCHOSPASM: ICD-10-CM

## 2020-04-28 PROCEDURE — 99214 OFFICE O/P EST MOD 30 MIN: CPT | Performed by: ALLERGY & IMMUNOLOGY

## 2020-04-28 RX ORDER — AMLODIPINE BESYLATE 2.5 MG/1
2.5 TABLET ORAL DAILY
Qty: 90 TABLET | Refills: 0 | Status: SHIPPED | OUTPATIENT
Start: 2020-04-28 | End: 2020-07-20

## 2020-04-28 ASSESSMENT — ENCOUNTER SYMPTOMS
CHEST TIGHTNESS: 0
DIARRHEA: 0
NAUSEA: 0
ABDOMINAL PAIN: 0
VOMITING: 0
FATIGUE: 0
SINUS PAIN: 0
SORE THROAT: 0
SHORTNESS OF BREATH: 0
FEVER: 0
CHILLS: 0
WHEEZING: 0
COUGH: 0

## 2020-05-01 ENCOUNTER — E-ADVICE (OUTPATIENT)
Dept: PSYCHOLOGY | Age: 80
End: 2020-05-01

## 2020-05-04 ENCOUNTER — TELEPHONE (OUTPATIENT)
Dept: ALLERGY | Age: 80
End: 2020-05-04

## 2020-05-05 RX ORDER — ALBUTEROL SULFATE 90 UG/1
2 AEROSOL, METERED RESPIRATORY (INHALATION) EVERY 4 HOURS PRN
Qty: 1 INHALER | Refills: 3 | Status: SHIPPED | OUTPATIENT
Start: 2020-05-05

## 2020-05-27 ENCOUNTER — LAB SERVICES (OUTPATIENT)
Dept: LAB | Age: 80
End: 2020-05-27

## 2020-05-27 DIAGNOSIS — E55.9 VITAMIN D DEFICIENCY, UNSPECIFIED: ICD-10-CM

## 2020-05-27 DIAGNOSIS — I10 ESSENTIAL HYPERTENSION: ICD-10-CM

## 2020-05-27 DIAGNOSIS — B99.9 RECURRENT INFECTIONS: ICD-10-CM

## 2020-05-27 DIAGNOSIS — Z85.46 HISTORY OF PROSTATE CANCER: ICD-10-CM

## 2020-05-27 DIAGNOSIS — E03.9 ACQUIRED HYPOTHYROIDISM: ICD-10-CM

## 2020-05-27 DIAGNOSIS — E78.00 PURE HYPERCHOLESTEROLEMIA: ICD-10-CM

## 2020-05-27 PROCEDURE — 82306 VITAMIN D 25 HYDROXY: CPT | Performed by: FAMILY MEDICINE

## 2020-05-27 PROCEDURE — 84153 ASSAY OF PSA TOTAL: CPT | Performed by: FAMILY MEDICINE

## 2020-05-27 PROCEDURE — 84443 ASSAY THYROID STIM HORMONE: CPT | Performed by: FAMILY MEDICINE

## 2020-05-27 PROCEDURE — 36415 COLL VENOUS BLD VENIPUNCTURE: CPT | Performed by: FAMILY MEDICINE

## 2020-05-27 PROCEDURE — 80061 LIPID PANEL: CPT | Performed by: FAMILY MEDICINE

## 2020-05-27 PROCEDURE — 80053 COMPREHEN METABOLIC PANEL: CPT | Performed by: FAMILY MEDICINE

## 2020-05-28 LAB
25(OH)D3 SERPL-MCNC: 81.1 NG/ML (ref 30–100)
ALBUMIN SERPL-MCNC: 3.6 G/DL (ref 3.6–5.1)
ALP SERPL-CCNC: 79 U/L (ref 45–115)
ALT SERPL W/O P-5'-P-CCNC: 12 U/L (ref 5–49)
AST SERPL-CCNC: 23 U/L (ref 14–43)
BILIRUB SERPL-MCNC: 0.7 MG/DL (ref 0–1.3)
BUN SERPL-MCNC: 20 MG/DL (ref 6–27)
CALCIUM SERPL-MCNC: 9 MG/DL (ref 8.6–10.6)
CHLORIDE SERPL-SCNC: 104 MMOL/L (ref 96–107)
CHOLEST SERPL-MCNC: 108 MG/DL (ref 140–200)
CO2 SERPL-SCNC: 28 MMOL/L (ref 22–32)
CREAT SERPL-MCNC: 0.9 MG/DL (ref 0.6–1.6)
GFR SERPL CREATININE-BSD FRML MDRD: >60 ML/MIN/{1.73M2}
GFR SERPL CREATININE-BSD FRML MDRD: >60 ML/MIN/{1.73M2}
GLUCOSE P FAST SERPL-MCNC: 90 MG/DL (ref 60–100)
HDLC SERPL-MCNC: 38 MG/DL
LDLC SERPL CALC-MCNC: 58 MG/DL (ref 30–100)
POTASSIUM SERPL-SCNC: 4 MMOL/L (ref 3.5–5.3)
PROT SERPL-MCNC: 6.2 G/DL (ref 6.4–8.5)
PSA SERPL-MCNC: <0.06 NG/ML (ref 0–6.8)
SODIUM SERPL-SCNC: 136 MMOL/L (ref 136–146)
TRIGL SERPL-MCNC: 60 MG/DL (ref 0–200)
TSH SERPL DL<=0.05 MIU/L-ACNC: 2.02 M[IU]/L (ref 0.3–4.82)

## 2020-06-01 ENCOUNTER — TELEPHONE (OUTPATIENT)
Dept: FAMILY MEDICINE | Age: 80
End: 2020-06-01

## 2020-06-03 RX ORDER — ATORVASTATIN CALCIUM 10 MG/1
10 TABLET, FILM COATED ORAL EVERY OTHER DAY
Qty: 90 TABLET | Refills: 0 | Status: SHIPPED | COMMUNITY
Start: 2020-06-03 | End: 2020-09-09 | Stop reason: SDUPTHER

## 2020-06-27 ENCOUNTER — TELEPHONE (OUTPATIENT)
Dept: FAMILY MEDICINE | Age: 80
End: 2020-06-27

## 2020-06-27 ENCOUNTER — E-ADVICE (OUTPATIENT)
Dept: FAMILY MEDICINE | Age: 80
End: 2020-06-27

## 2020-06-27 DIAGNOSIS — R68.89 ABNORMAL HEAD CIRCUMFERENCE IN RELATION TO GROWTH AND AGE STANDARD: Primary | ICD-10-CM

## 2020-06-27 DIAGNOSIS — D49.2 ABNORMAL SKIN GROWTH: ICD-10-CM

## 2020-06-30 ENCOUNTER — E-ADVICE (OUTPATIENT)
Dept: DERMATOLOGY | Age: 80
End: 2020-06-30

## 2020-07-06 RX ORDER — ESCITALOPRAM OXALATE 5 MG/1
TABLET ORAL
Qty: 30 TABLET | Refills: 8 | Status: SHIPPED | OUTPATIENT
Start: 2020-07-06 | End: 2020-09-09 | Stop reason: SDUPTHER

## 2020-07-08 ENCOUNTER — OFFICE VISIT (OUTPATIENT)
Dept: DERMATOLOGY | Age: 80
End: 2020-07-08

## 2020-07-08 ENCOUNTER — TELEPHONE (OUTPATIENT)
Dept: FAMILY MEDICINE | Age: 80
End: 2020-07-08

## 2020-07-08 DIAGNOSIS — D48.5 NEOPLASM OF UNCERTAIN BEHAVIOR OF SKIN: Primary | ICD-10-CM

## 2020-07-08 PROCEDURE — 11102 TANGNTL BX SKIN SINGLE LES: CPT | Performed by: DERMATOLOGY

## 2020-07-08 PROCEDURE — 99203 OFFICE O/P NEW LOW 30 MIN: CPT | Performed by: DERMATOLOGY

## 2020-07-10 ENCOUNTER — TELEPHONE (OUTPATIENT)
Dept: PSYCHOLOGY | Age: 80
End: 2020-07-10

## 2020-07-10 LAB — PATH REPORT PLASRBC-IMP: NORMAL

## 2020-07-14 ENCOUNTER — TELEPHONE (OUTPATIENT)
Dept: DERMATOLOGY | Age: 80
End: 2020-07-14

## 2020-07-20 RX ORDER — AMLODIPINE BESYLATE 2.5 MG/1
TABLET ORAL
Qty: 90 TABLET | Refills: 2 | Status: SHIPPED | OUTPATIENT
Start: 2020-07-20 | End: 2020-09-09 | Stop reason: SDUPTHER

## 2020-07-27 ENCOUNTER — LAB SERVICES (OUTPATIENT)
Dept: LAB | Age: 80
End: 2020-07-27

## 2020-07-30 ENCOUNTER — OFFICE VISIT (OUTPATIENT)
Dept: DERMATOLOGY | Age: 80
End: 2020-07-30

## 2020-07-30 DIAGNOSIS — L57.0 ACTINIC KERATOSES: Primary | ICD-10-CM

## 2020-07-30 PROCEDURE — 17004 DESTROY PREMAL LESIONS 15/>: CPT | Performed by: DERMATOLOGY

## 2020-07-30 PROCEDURE — 99213 OFFICE O/P EST LOW 20 MIN: CPT | Performed by: DERMATOLOGY

## 2020-08-10 ENCOUNTER — TELEPHONE (OUTPATIENT)
Dept: FAMILY MEDICINE | Age: 80
End: 2020-08-10

## 2020-08-19 RX ORDER — LEVOTHYROXINE SODIUM 50 UG/1
TABLET ORAL
Qty: 90 TABLET | Refills: 0 | Status: SHIPPED | OUTPATIENT
Start: 2020-08-19 | End: 2020-09-09 | Stop reason: SDUPTHER

## 2020-08-27 ENCOUNTER — TELEPHONE (OUTPATIENT)
Dept: FAMILY MEDICINE | Age: 80
End: 2020-08-27

## 2020-08-27 DIAGNOSIS — E78.00 PURE HYPERCHOLESTEROLEMIA: ICD-10-CM

## 2020-08-27 DIAGNOSIS — E03.9 ACQUIRED HYPOTHYROIDISM: ICD-10-CM

## 2020-08-27 DIAGNOSIS — E78.5 HYPERLIPIDEMIA, UNSPECIFIED HYPERLIPIDEMIA TYPE: Primary | ICD-10-CM

## 2020-08-28 ENCOUNTER — LAB SERVICES (OUTPATIENT)
Dept: LAB | Age: 80
End: 2020-08-28

## 2020-08-28 DIAGNOSIS — E78.5 HYPERLIPIDEMIA, UNSPECIFIED HYPERLIPIDEMIA TYPE: ICD-10-CM

## 2020-08-28 DIAGNOSIS — E03.9 ACQUIRED HYPOTHYROIDISM: ICD-10-CM

## 2020-08-28 DIAGNOSIS — E78.00 PURE HYPERCHOLESTEROLEMIA: ICD-10-CM

## 2020-08-28 DIAGNOSIS — J98.01 BRONCHOSPASM: ICD-10-CM

## 2020-08-28 LAB
ALBUMIN SERPL-MCNC: 3.8 G/DL (ref 3.6–5.1)
ALP SERPL-CCNC: 95 U/L (ref 45–115)
ALT SERPL W/O P-5'-P-CCNC: 16 U/L (ref 5–49)
AST SERPL-CCNC: 27 U/L (ref 14–43)
BASOPHIL %: 0.9 % (ref 0–1.2)
BASOPHIL ABSOLUTE #: 0.1 10*3/UL (ref 0–0.1)
BILIRUB SERPL-MCNC: 1 MG/DL (ref 0–1.3)
BUN SERPL-MCNC: 19 MG/DL (ref 6–27)
CALCIUM SERPL-MCNC: 9.1 MG/DL (ref 8.6–10.6)
CHLORIDE SERPL-SCNC: 107 MMOL/L (ref 96–107)
CHOLEST SERPL-MCNC: 116 MG/DL (ref 140–200)
CO2 SERPL-SCNC: 29 MMOL/L (ref 22–32)
CREAT SERPL-MCNC: 0.8 MG/DL (ref 0.6–1.6)
DIFFERENTIAL TYPE: ABNORMAL
EOSINOPHIL %: 2.8 % (ref 0–10)
EOSINOPHIL ABSOLUTE #: 0.2 10*3/UL (ref 0–0.5)
GFR SERPL CREATININE-BSD FRML MDRD: >60 ML/MIN/{1.73M2}
GFR SERPL CREATININE-BSD FRML MDRD: >60 ML/MIN/{1.73M2}
GLUCOSE P FAST SERPL-MCNC: 96 MG/DL (ref 60–100)
HDLC SERPL-MCNC: 46 MG/DL
HEMATOCRIT: 43.2 % (ref 40–51)
HEMOGLOBIN: 14.6 G/DL (ref 13.7–17.5)
IMMATURE GRANULOCYTE ABSOLUTE: 0.02 10*3/UL (ref 0–0.05)
IMMATURE GRANULOCYTE PERCENT: 0.3 % (ref 0–0.5)
LDLC SERPL CALC-MCNC: 63 MG/DL (ref 30–100)
LYMPH PERCENT: 17.4 % (ref 20.5–51.1)
LYMPHOCYTE ABSOLUTE #: 1 10*3/UL (ref 1.2–3.4)
MEAN CORPUSCULAR HGB CONCENTRATION: 33.8 % (ref 32–36)
MEAN CORPUSCULAR HGB: 31.4 PG (ref 27–34)
MEAN CORPUSCULAR VOLUME: 92.9 FL (ref 79–95)
MEAN PLATELET VOLUME: 9.9 FL (ref 8.6–12.4)
MONOCYTE ABSOLUTE #: 0.5 10*3/UL (ref 0.2–0.9)
MONOCYTE PERCENT: 8.3 % (ref 4.3–12.9)
NEUTROPHIL ABSOLUTE #: 4.1 10*3/UL (ref 1.4–6.5)
NEUTROPHIL PERCENT: 70.3 % (ref 34–73.5)
PLATELET COUNT: 201 10*3/UL (ref 150–400)
POTASSIUM SERPL-SCNC: 4.1 MMOL/L (ref 3.5–5.3)
PROT SERPL-MCNC: 6.2 G/DL (ref 6.4–8.5)
RED BLOOD CELL COUNT: 4.65 10*6/UL (ref 3.9–5.7)
RED CELL DISTRIBUTION WIDTH: 14.2 % (ref 11.3–14.8)
SODIUM SERPL-SCNC: 139 MMOL/L (ref 136–146)
TRIGL SERPL-MCNC: 37 MG/DL (ref 0–200)
TSH SERPL DL<=0.05 MIU/L-ACNC: 2.63 M[IU]/L (ref 0.3–4.82)
WHITE BLOOD CELL COUNT: 5.8 10*3/UL (ref 4–10)

## 2020-08-28 PROCEDURE — 84443 ASSAY THYROID STIM HORMONE: CPT | Performed by: FAMILY MEDICINE

## 2020-08-28 PROCEDURE — 80061 LIPID PANEL: CPT | Performed by: FAMILY MEDICINE

## 2020-08-28 PROCEDURE — 85025 COMPLETE CBC W/AUTO DIFF WBC: CPT | Performed by: FAMILY MEDICINE

## 2020-08-28 PROCEDURE — 80053 COMPREHEN METABOLIC PANEL: CPT | Performed by: FAMILY MEDICINE

## 2020-08-28 PROCEDURE — 36415 COLL VENOUS BLD VENIPUNCTURE: CPT | Performed by: FAMILY MEDICINE

## 2020-08-28 RX ORDER — LEVOTHYROXINE SODIUM 50 UG/1
TABLET ORAL
Qty: 90 TABLET | Refills: 0 | OUTPATIENT
Start: 2020-08-28

## 2020-08-31 RX ORDER — FLUTICASONE FUROATE 100 UG/1
POWDER RESPIRATORY (INHALATION)
Qty: 30 EACH | Refills: 0 | Status: SHIPPED | OUTPATIENT
Start: 2020-08-31 | End: 2020-09-29 | Stop reason: SDUPTHER

## 2020-09-08 ENCOUNTER — TELEPHONE (OUTPATIENT)
Dept: FAMILY MEDICINE | Age: 80
End: 2020-09-08

## 2020-09-08 ASSESSMENT — PATIENT HEALTH QUESTIONNAIRE - PHQ9
2. FEELING DOWN, DEPRESSED OR HOPELESS: NOT AT ALL
1. LITTLE INTEREST OR PLEASURE IN DOING THINGS: NOT AT ALL
CLINICAL INTERPRETATION OF PHQ2 SCORE: 0
CLINICAL INTERPRETATION OF PHQ2 SCORE: NO FURTHER SCREENING NEEDED

## 2020-09-09 ENCOUNTER — OFFICE VISIT (OUTPATIENT)
Dept: FAMILY MEDICINE | Age: 80
End: 2020-09-09

## 2020-09-09 VITALS
DIASTOLIC BLOOD PRESSURE: 80 MMHG | BODY MASS INDEX: 22.32 KG/M2 | SYSTOLIC BLOOD PRESSURE: 140 MMHG | HEART RATE: 80 BPM | TEMPERATURE: 96 F | WEIGHT: 126 LBS | HEIGHT: 63 IN

## 2020-09-09 DIAGNOSIS — Z90.79 S/P PROSTATECTOMY: ICD-10-CM

## 2020-09-09 DIAGNOSIS — K59.00 CONSTIPATION, UNSPECIFIED CONSTIPATION TYPE: ICD-10-CM

## 2020-09-09 DIAGNOSIS — Z00.01 ENCOUNTER FOR GENERAL ADULT MEDICAL EXAMINATION WITH ABNORMAL FINDINGS: Primary | ICD-10-CM

## 2020-09-09 DIAGNOSIS — C61 PROSTATE CANCER (CMD): ICD-10-CM

## 2020-09-09 DIAGNOSIS — K43.9 VENTRAL HERNIA WITHOUT OBSTRUCTION OR GANGRENE: ICD-10-CM

## 2020-09-09 DIAGNOSIS — Z23 NEED FOR VACCINATION: ICD-10-CM

## 2020-09-09 DIAGNOSIS — E78.5 HYPERLIPIDEMIA, UNSPECIFIED HYPERLIPIDEMIA TYPE: ICD-10-CM

## 2020-09-09 DIAGNOSIS — I10 ESSENTIAL HYPERTENSION: ICD-10-CM

## 2020-09-09 DIAGNOSIS — F33.42 RECURRENT MAJOR DEPRESSIVE DISORDER, IN FULL REMISSION (CMD): ICD-10-CM

## 2020-09-09 PROCEDURE — G0009 ADMIN PNEUMOCOCCAL VACCINE: HCPCS

## 2020-09-09 PROCEDURE — 99214 OFFICE O/P EST MOD 30 MIN: CPT | Performed by: FAMILY MEDICINE

## 2020-09-09 PROCEDURE — G0439 PPPS, SUBSEQ VISIT: HCPCS | Performed by: FAMILY MEDICINE

## 2020-09-09 PROCEDURE — 90732 PPSV23 VACC 2 YRS+ SUBQ/IM: CPT

## 2020-09-09 RX ORDER — AMLODIPINE BESYLATE 5 MG/1
5 TABLET ORAL DAILY
Qty: 90 TABLET | Refills: 3 | Status: SHIPPED | OUTPATIENT
Start: 2020-09-09

## 2020-09-09 RX ORDER — ATORVASTATIN CALCIUM 10 MG/1
10 TABLET, FILM COATED ORAL NIGHTLY
Qty: 90 TABLET | Refills: 3 | Status: SHIPPED | OUTPATIENT
Start: 2020-09-09 | End: 2021-09-09

## 2020-09-09 RX ORDER — ESCITALOPRAM OXALATE 5 MG/1
5 TABLET ORAL DAILY
Qty: 90 TABLET | Refills: 3 | Status: SHIPPED | OUTPATIENT
Start: 2020-09-09

## 2020-09-09 RX ORDER — LEVOTHYROXINE SODIUM 0.05 MG/1
50 TABLET ORAL DAILY
Qty: 90 TABLET | Refills: 3 | Status: SHIPPED | OUTPATIENT
Start: 2020-09-09 | End: 2021-11-20

## 2020-09-09 SDOH — HEALTH STABILITY: MENTAL HEALTH: HOW OFTEN DO YOU HAVE A DRINK CONTAINING ALCOHOL?: NEVER

## 2020-09-09 ASSESSMENT — PATIENT HEALTH QUESTIONNAIRE - PHQ9
SUM OF ALL RESPONSES TO PHQ9 QUESTIONS 1 AND 2: 0
SUM OF ALL RESPONSES TO PHQ9 QUESTIONS 1 AND 2: 0
1. LITTLE INTEREST OR PLEASURE IN DOING THINGS: NOT AT ALL
CLINICAL INTERPRETATION OF PHQ2 SCORE: NO FURTHER SCREENING NEEDED
2. FEELING DOWN, DEPRESSED OR HOPELESS: NOT AT ALL
CLINICAL INTERPRETATION OF PHQ9 SCORE: NO FURTHER SCREENING NEEDED

## 2020-09-22 ENCOUNTER — OFFICE VISIT (OUTPATIENT)
Dept: SURGERY | Facility: CLINIC | Age: 80
End: 2020-09-22
Payer: MEDICARE

## 2020-09-22 DIAGNOSIS — C61 PROSTATE CANCER (HCC): ICD-10-CM

## 2020-09-22 DIAGNOSIS — I10 HYPERTENSION, UNSPECIFIED TYPE: ICD-10-CM

## 2020-09-22 DIAGNOSIS — Z90.49 HISTORY OF CHOLECYSTECTOMY: ICD-10-CM

## 2020-09-22 DIAGNOSIS — Z80.0 FAMILY HISTORY OF COLON CANCER: ICD-10-CM

## 2020-09-22 DIAGNOSIS — Z01.818 PRE-OP TESTING: ICD-10-CM

## 2020-09-22 DIAGNOSIS — K43.9 VENTRAL HERNIA WITHOUT OBSTRUCTION OR GANGRENE: ICD-10-CM

## 2020-09-22 DIAGNOSIS — K57.90 DIVERTICULOSIS: ICD-10-CM

## 2020-09-22 DIAGNOSIS — K59.01 SLOW TRANSIT CONSTIPATION: ICD-10-CM

## 2020-09-22 DIAGNOSIS — K43.9 EPIGASTRIC HERNIA: Primary | ICD-10-CM

## 2020-09-22 DIAGNOSIS — R63.4 UNINTENTIONAL WEIGHT LOSS: ICD-10-CM

## 2020-09-22 DIAGNOSIS — Z90.79 S/P PROSTATECTOMY: ICD-10-CM

## 2020-09-22 DIAGNOSIS — D12.6 ADENOMATOUS POLYP OF COLON, UNSPECIFIED PART OF COLON: ICD-10-CM

## 2020-09-22 PROCEDURE — 99215 OFFICE O/P EST HI 40 MIN: CPT | Performed by: COLON & RECTAL SURGERY

## 2020-09-22 RX ORDER — POLYETHYLENE GLYCOL 3350, SODIUM CHLORIDE, SODIUM BICARBONATE, POTASSIUM CHLORIDE 420; 11.2; 5.72; 1.48 G/4L; G/4L; G/4L; G/4L
POWDER, FOR SOLUTION ORAL
Qty: 1 BOTTLE | Refills: 0 | Status: ON HOLD | OUTPATIENT
Start: 2020-09-22 | End: 2020-11-11

## 2020-09-22 NOTE — PATIENT INSTRUCTIONS
The patient presents today in consultation for multiple problems involving multiple different organ systems. The patient's first issue that he presents with today is a family history of colon cancer and personal history of colon polyps.     The patient s cholecystectomy. The patient has a past medical history significant for hypertension, hyperlipidemia, and hypothyroidism. Clinical examination of the abdomen feels to be soft, nondistended, tender to palpation in the epigastrium.   Bowel sounds are no details. Consent for procedures were confirmed with the patient.

## 2020-09-22 NOTE — PROGRESS NOTES
Visit Note       Active Problems      1. Epigastric hernia    2. Adenomatous polyp of colon, unspecified part of colon    3. Family history of colon cancer    4. Slow transit constipation    5. Prostate cancer ([facility] [address]    6. S/P prostatectomy    7.  History of testing done for this. The patient second issues that he presents for today is a bulge in his epigastric region. The patient states that he has noticed this bulge to be present for the last 2 years. It has slightly increased in size.   He states it i TONSILLECTOMY         The family history and social history have been reviewed by me today. History reviewed. No pertinent family history.   Social History    Socioeconomic History      Marital status:       Spouse name: Not on file      Number of and urgency. Musculoskeletal: Negative for arthralgias and myalgias. Skin: Negative for color change and rash. Neurological: Negative for tremors, syncope and weakness. Hematological: Negative for adenopathy. Does not bruise/bleed easily.    Psychia of motion. Lymphadenopathy:     He has no cervical adenopathy. Neurological: He is alert and oriented to person, place, and time. Skin: Skin is warm and dry. No rash noted. He is not diaphoretic. Psychiatric: He has a normal mood and affect.  His be did not have COVID, however he has never had any testing done for this. The patient second issues that he presents for today is a bulge in his epigastric region. The patient states that he has noticed this bulge to be present for the last 2 years.   I scheduled the patient undergo a colonoscopy at the Center for [facility] in [facility]. It was also discussed the patient that he does have a hernia at the epigastric trocar site, I do recommend repair of the hernia at this time.   We will schedul

## 2020-09-28 ASSESSMENT — ENCOUNTER SYMPTOMS
HEADACHES: 0
ADENOPATHY: 0
APPETITE CHANGE: 0
DIARRHEA: 0
ABDOMINAL PAIN: 0
NERVOUS/ANXIOUS: 0
SORE THROAT: 0
VOMITING: 0
SLEEP DISTURBANCE: 0
FACIAL SWELLING: 0
CHEST TIGHTNESS: 0
FEVER: 0
WHEEZING: 0

## 2020-09-29 ENCOUNTER — APPOINTMENT (OUTPATIENT)
Dept: ALLERGY | Age: 80
End: 2020-09-29

## 2020-09-29 ENCOUNTER — LAB SERVICES (OUTPATIENT)
Dept: LAB | Age: 80
End: 2020-09-29

## 2020-09-29 ENCOUNTER — OFFICE VISIT (OUTPATIENT)
Dept: ALLERGY | Age: 80
End: 2020-09-29

## 2020-09-29 VITALS
RESPIRATION RATE: 18 BRPM | SYSTOLIC BLOOD PRESSURE: 120 MMHG | TEMPERATURE: 98.8 F | OXYGEN SATURATION: 97 % | BODY MASS INDEX: 20.18 KG/M2 | WEIGHT: 125.6 LBS | HEART RATE: 65 BPM | DIASTOLIC BLOOD PRESSURE: 62 MMHG | HEIGHT: 66 IN

## 2020-09-29 DIAGNOSIS — B99.9 RECURRENT INFECTIONS: ICD-10-CM

## 2020-09-29 DIAGNOSIS — Z87.09 HISTORY OF URI (UPPER RESPIRATORY INFECTION): ICD-10-CM

## 2020-09-29 DIAGNOSIS — R50.9 FEVER, UNSPECIFIED FEVER CAUSE: ICD-10-CM

## 2020-09-29 DIAGNOSIS — J98.01 BRONCHOSPASM: Primary | ICD-10-CM

## 2020-09-29 DIAGNOSIS — R63.4 WEIGHT LOSS: ICD-10-CM

## 2020-09-29 DIAGNOSIS — H10.10 ALLERGIC RHINOCONJUNCTIVITIS: ICD-10-CM

## 2020-09-29 DIAGNOSIS — J30.9 ALLERGIC RHINOCONJUNCTIVITIS: ICD-10-CM

## 2020-09-29 PROCEDURE — 99214 OFFICE O/P EST MOD 30 MIN: CPT | Performed by: ALLERGY & IMMUNOLOGY

## 2020-09-29 PROCEDURE — 36415 COLL VENOUS BLD VENIPUNCTURE: CPT | Performed by: ALLERGY & IMMUNOLOGY

## 2020-09-29 RX ORDER — AZELASTINE HYDROCHLORIDE 0.5 MG/ML
1 SOLUTION/ DROPS OPHTHALMIC 2 TIMES DAILY PRN
Qty: 1 BOTTLE | Refills: 3 | Status: SHIPPED | OUTPATIENT
Start: 2020-09-29

## 2020-09-29 ASSESSMENT — ENCOUNTER SYMPTOMS: EYE ITCHING: 1

## 2020-09-30 ENCOUNTER — TELEPHONE (OUTPATIENT)
Dept: FAMILY MEDICINE | Age: 80
End: 2020-09-30

## 2020-09-30 LAB
SARS-COV-2 IGG SERPL QL IA: NEGATIVE
SARS-COV-2 N IGG SERPL QL IA: 0.02

## 2020-10-01 ENCOUNTER — TELEPHONE (OUTPATIENT)
Dept: ALLERGY | Age: 80
End: 2020-10-01

## 2020-10-02 ENCOUNTER — TELEPHONE (OUTPATIENT)
Dept: PSYCHOLOGY | Age: 80
End: 2020-10-02

## 2020-10-02 ENCOUNTER — TELEPHONE (OUTPATIENT)
Dept: ALLERGY | Age: 80
End: 2020-10-02

## 2020-10-13 ENCOUNTER — TELEPHONE (OUTPATIENT)
Dept: FAMILY MEDICINE | Age: 80
End: 2020-10-13

## 2020-10-24 ENCOUNTER — LAB ENCOUNTER (OUTPATIENT)
Dept: LAB | Age: 80
End: 2020-10-24
Attending: COLON & RECTAL SURGERY
Payer: MEDICARE

## 2020-10-24 ENCOUNTER — APPOINTMENT (OUTPATIENT)
Dept: LAB | Age: 80
End: 2020-10-24
Attending: COLON & RECTAL SURGERY
Payer: MEDICARE

## 2020-10-24 ENCOUNTER — APPOINTMENT (OUTPATIENT)
Dept: CT IMAGING | Age: 80
End: 2020-10-24
Attending: COLON & RECTAL SURGERY
Payer: MEDICARE

## 2020-10-24 ENCOUNTER — HOSPITAL ENCOUNTER (OUTPATIENT)
Dept: CT IMAGING | Age: 80
Discharge: HOME | End: 2020-10-24
Attending: COLON & RECTAL SURGERY
Payer: MEDICARE

## 2020-10-24 ENCOUNTER — HOSPITAL ENCOUNTER (OUTPATIENT)
Dept: CT IMAGING | Age: 80
Discharge: HOME | End: 2020-10-24
Attending: FAMILY MEDICINE
Payer: MEDICARE

## 2020-10-24 DIAGNOSIS — R63.4 UNINTENTIONAL WEIGHT LOSS: ICD-10-CM

## 2020-10-24 DIAGNOSIS — R31.9 HEMATURIA, UNSPECIFIED: ICD-10-CM

## 2020-10-24 DIAGNOSIS — Z01.818 PRE-OP TESTING: ICD-10-CM

## 2020-10-24 PROCEDURE — 36415 COLL VENOUS BLD VENIPUNCTURE: CPT

## 2020-10-24 PROCEDURE — 82565 ASSAY OF CREATININE: CPT

## 2020-10-24 PROCEDURE — 80053 COMPREHEN METABOLIC PANEL: CPT

## 2020-10-24 PROCEDURE — 85025 COMPLETE CBC W/AUTO DIFF WBC: CPT

## 2020-10-24 PROCEDURE — 74178 CT ABD&PLV WO CNTR FLWD CNTR: CPT | Performed by: FAMILY MEDICINE

## 2020-10-27 PROCEDURE — 88305 TISSUE EXAM BY PATHOLOGIST: CPT | Performed by: COLON & RECTAL SURGERY

## 2020-10-29 ENCOUNTER — LAB REQUISITION (OUTPATIENT)
Dept: LAB | Facility: HOSPITAL | Age: 80
End: 2020-10-29
Attending: COLON & RECTAL SURGERY
Payer: MEDICARE

## 2020-10-29 ENCOUNTER — OFFICE VISIT (OUTPATIENT)
Dept: SURGERY | Facility: CLINIC | Age: 80
End: 2020-10-29
Payer: MEDICARE

## 2020-10-29 VITALS
TEMPERATURE: 99 F | BODY MASS INDEX: 19.29 KG/M2 | SYSTOLIC BLOOD PRESSURE: 152 MMHG | HEART RATE: 78 BPM | WEIGHT: 120 LBS | HEIGHT: 66 IN | DIASTOLIC BLOOD PRESSURE: 76 MMHG

## 2020-10-29 DIAGNOSIS — Z90.79 S/P PROSTATECTOMY: ICD-10-CM

## 2020-10-29 DIAGNOSIS — R59.0 ABDOMINAL LYMPHADENOPATHY: ICD-10-CM

## 2020-10-29 DIAGNOSIS — Z83.71 FAMILY HISTORY OF COLONIC POLYPS: ICD-10-CM

## 2020-10-29 DIAGNOSIS — R59.1 LYMPHADENOPATHY: Primary | ICD-10-CM

## 2020-10-29 DIAGNOSIS — C61 PROSTATE CANCER (HCC): ICD-10-CM

## 2020-10-29 DIAGNOSIS — Z90.49 HISTORY OF CHOLECYSTECTOMY: ICD-10-CM

## 2020-10-29 DIAGNOSIS — R31.0 GROSS HEMATURIA: ICD-10-CM

## 2020-10-29 DIAGNOSIS — K43.9 EPIGASTRIC HERNIA: ICD-10-CM

## 2020-10-29 DIAGNOSIS — Z85.46 HISTORY OF PROSTATE CANCER: ICD-10-CM

## 2020-10-29 DIAGNOSIS — Z86.010 PERSONAL HISTORY OF COLONIC POLYPS: ICD-10-CM

## 2020-10-29 DIAGNOSIS — R63.4 UNINTENTIONAL WEIGHT LOSS: ICD-10-CM

## 2020-10-29 PROCEDURE — 99215 OFFICE O/P EST HI 40 MIN: CPT | Performed by: COLON & RECTAL SURGERY

## 2020-10-29 NOTE — PROGRESS NOTES
Follow Up Visit Note       Active Problems      1. Lymphadenopathy    2. History of prostate cancer    3. Unintentional weight loss    4. S/P prostatectomy    5. History of cholecystectomy    6. Epigastric hernia    7. Prostate cancer ([facility] [address]    8.  Gross hem been near 0. The patient does have blood in his urine. His last cystoscopy was 3 years ago. The patient also complains of a rash on his right lower quadrant. He had a blistering infection on his lower extremities.   He states that he has had allergi hepatic enlargement. BILIARY:  Status post cholecystectomy. No biliary ductal dilatation. PANCREAS:  No lesion, fluid collection, ductal dilatation, or atrophy. SPLEEN:  No enlargement or focal lesion.     KIDNEYS:  There is a 5 mm hypoattenuating les Degenerative changes are seen in the lower lumbar spine especially involving the facet joints. No lytic or blastic bony lesions are identified. LUNG BASES:  No visible pulmonary or pleural disease.     OTHER:  Negative.                     =====  CONCLUSI on file.   Social History    Socioeconomic History      Marital status:       Spouse name: Not on file      Number of children: Not on file      Years of education: Not on file      Highest education level: Not on file    Tobacco Use      Smoking sta Negative for difficulty urinating, dysuria, frequency and urgency. Musculoskeletal: Negative for arthralgias and myalgias. Skin: Negative for color change and rash. Neurological: Negative for tremors, syncope and weakness.    Hematological: Negative f hilum and the periaortic tissues. They recommend a gallium 68 nuclear medicine scan. This is in light of the fact that he has a history of prostate cancer. I personally reviewed the CT scan myself.   It does reveal the epigastric hernia contents to be fa metastatic cancer cannot be ruled out. I have given him the name and number of Dr. Kareem Abel, for further evaluation of the lymph node. We have also ordered the gallium 68 nuclear medicine scan. He should get this exam prior to seeing Dr. Dori Kehr.

## 2020-10-30 PROBLEM — R59.0 ABDOMINAL LYMPHADENOPATHY: Status: ACTIVE | Noted: 2020-10-30

## 2020-10-30 PROBLEM — R31.0 GROSS HEMATURIA: Status: ACTIVE | Noted: 2020-10-30

## 2020-10-30 NOTE — PATIENT INSTRUCTIONS
This patient is presenting for final surgical decision making regarding an epigastric hernia that is incarcerated. The patient was incidentally noted to have unintentional weight loss of greater than 10% of his body mass.     The patient underwent colono rash on his right lower quadrant. There are no inguinal or umbilical hernias present. In his epigastric port site from his laparoscopic cholecystectomy, there is a large 3 cm incarcerated hernia. It is slightly tender, nonreducible.     We have had sever

## 2020-11-02 RX ORDER — FLUTICASONE PROPIONATE 50 MCG
2 SPRAY, SUSPENSION (ML) NASAL AS NEEDED
COMMUNITY

## 2020-11-02 RX ORDER — AZELASTINE HYDROCHLORIDE 0.5 MG/ML
1 SOLUTION/ DROPS OPHTHALMIC AS NEEDED
COMMUNITY

## 2020-11-02 RX ORDER — ESCITALOPRAM OXALATE 5 MG/1
5 TABLET ORAL DAILY
COMMUNITY
End: 2021-04-27

## 2020-11-08 ENCOUNTER — APPOINTMENT (OUTPATIENT)
Dept: LAB | Facility: HOSPITAL | Age: 80
End: 2020-11-08
Attending: COLON & RECTAL SURGERY
Payer: MEDICARE

## 2020-11-08 ENCOUNTER — APPOINTMENT (OUTPATIENT)
Dept: LAB | Facility: HOSPITAL | Age: 80
End: 2020-11-08
Payer: MEDICARE

## 2020-11-08 DIAGNOSIS — K43.9 VENTRAL HERNIA: ICD-10-CM

## 2020-11-08 PROCEDURE — 93005 ELECTROCARDIOGRAM TRACING: CPT

## 2020-11-08 PROCEDURE — 93010 ELECTROCARDIOGRAM REPORT: CPT | Performed by: INTERNAL MEDICINE

## 2020-11-10 ENCOUNTER — ANESTHESIA EVENT (OUTPATIENT)
Dept: SURGERY | Facility: HOSPITAL | Age: 80
End: 2020-11-10
Payer: MEDICARE

## 2020-11-11 ENCOUNTER — ANESTHESIA (OUTPATIENT)
Dept: SURGERY | Facility: HOSPITAL | Age: 80
End: 2020-11-11
Payer: MEDICARE

## 2020-11-11 ENCOUNTER — HOSPITAL ENCOUNTER (OUTPATIENT)
Facility: HOSPITAL | Age: 80
Setting detail: HOSPITAL OUTPATIENT SURGERY
Discharge: HOME | End: 2020-11-11
Attending: COLON & RECTAL SURGERY | Admitting: COLON & RECTAL SURGERY
Payer: MEDICARE

## 2020-11-11 VITALS
HEART RATE: 88 BPM | TEMPERATURE: 98 F | BODY MASS INDEX: 20.33 KG/M2 | DIASTOLIC BLOOD PRESSURE: 70 MMHG | RESPIRATION RATE: 18 BRPM | SYSTOLIC BLOOD PRESSURE: 130 MMHG | WEIGHT: 122 LBS | HEIGHT: 65 IN | OXYGEN SATURATION: 98 %

## 2020-11-11 DIAGNOSIS — K43.9 VENTRAL HERNIA: Primary | ICD-10-CM

## 2020-11-11 DIAGNOSIS — K43.9 VENTRAL HERNIA WITHOUT OBSTRUCTION OR GANGRENE: ICD-10-CM

## 2020-11-11 PROCEDURE — 88184 FLOWCYTOMETRY/ TC 1 MARKER: CPT | Performed by: COLON & RECTAL SURGERY

## 2020-11-11 PROCEDURE — 87206 SMEAR FLUORESCENT/ACID STAI: CPT | Performed by: COLON & RECTAL SURGERY

## 2020-11-11 PROCEDURE — 07B60ZX EXCISION OF LEFT AXILLARY LYMPHATIC, OPEN APPROACH, DIAGNOSTIC: ICD-10-PCS | Performed by: COLON & RECTAL SURGERY

## 2020-11-11 PROCEDURE — 88342 IMHCHEM/IMCYTCHM 1ST ANTB: CPT | Performed by: COLON & RECTAL SURGERY

## 2020-11-11 PROCEDURE — 88185 FLOWCYTOMETRY/TC ADD-ON: CPT | Performed by: COLON & RECTAL SURGERY

## 2020-11-11 PROCEDURE — 87116 MYCOBACTERIA CULTURE: CPT | Performed by: COLON & RECTAL SURGERY

## 2020-11-11 PROCEDURE — 88307 TISSUE EXAM BY PATHOLOGIST: CPT | Performed by: COLON & RECTAL SURGERY

## 2020-11-11 PROCEDURE — 88341 IMHCHEM/IMCYTCHM EA ADD ANTB: CPT | Performed by: COLON & RECTAL SURGERY

## 2020-11-11 PROCEDURE — 88312 SPECIAL STAINS GROUP 1: CPT | Performed by: COLON & RECTAL SURGERY

## 2020-11-11 PROCEDURE — 87102 FUNGUS ISOLATION CULTURE: CPT | Performed by: COLON & RECTAL SURGERY

## 2020-11-11 PROCEDURE — 88333 PATH CONSLTJ SURG CYTO XM 1: CPT | Performed by: COLON & RECTAL SURGERY

## 2020-11-11 PROCEDURE — 0WQF0ZZ REPAIR ABDOMINAL WALL, OPEN APPROACH: ICD-10-PCS | Performed by: COLON & RECTAL SURGERY

## 2020-11-11 PROCEDURE — 88302 TISSUE EXAM BY PATHOLOGIST: CPT | Performed by: COLON & RECTAL SURGERY

## 2020-11-11 RX ORDER — HYDROCODONE BITARTRATE AND ACETAMINOPHEN 5; 325 MG/1; MG/1
2 TABLET ORAL AS NEEDED
Status: COMPLETED | OUTPATIENT
Start: 2020-11-11 | End: 2020-11-11

## 2020-11-11 RX ORDER — SODIUM CHLORIDE, SODIUM LACTATE, POTASSIUM CHLORIDE, CALCIUM CHLORIDE 600; 310; 30; 20 MG/100ML; MG/100ML; MG/100ML; MG/100ML
INJECTION, SOLUTION INTRAVENOUS CONTINUOUS
Status: DISCONTINUED | OUTPATIENT
Start: 2020-11-11 | End: 2020-11-11

## 2020-11-11 RX ORDER — ROCURONIUM BROMIDE 10 MG/ML
INJECTION, SOLUTION INTRAVENOUS AS NEEDED
Status: DISCONTINUED | OUTPATIENT
Start: 2020-11-11 | End: 2020-11-11 | Stop reason: SURG

## 2020-11-11 RX ORDER — CEFAZOLIN SODIUM/WATER 2 G/20 ML
SYRINGE (ML) INTRAVENOUS AS NEEDED
Status: DISCONTINUED | OUTPATIENT
Start: 2020-11-11 | End: 2020-11-11 | Stop reason: SURG

## 2020-11-11 RX ORDER — HYDROMORPHONE HYDROCHLORIDE 1 MG/ML
0.2 INJECTION, SOLUTION INTRAMUSCULAR; INTRAVENOUS; SUBCUTANEOUS EVERY 5 MIN PRN
Status: DISCONTINUED | OUTPATIENT
Start: 2020-11-11 | End: 2020-11-11

## 2020-11-11 RX ORDER — CEFOXITIN 2 G/1
INJECTION, POWDER, FOR SOLUTION INTRAVENOUS AS NEEDED
Status: DISCONTINUED | OUTPATIENT
Start: 2020-11-11 | End: 2020-11-11

## 2020-11-11 RX ORDER — HEPARIN SODIUM 5000 [USP'U]/ML
INJECTION, SOLUTION INTRAVENOUS; SUBCUTANEOUS
Status: DISCONTINUED
Start: 2020-11-11 | End: 2020-11-11

## 2020-11-11 RX ORDER — NEOSTIGMINE METHYLSULFATE 1 MG/ML
INJECTION INTRAVENOUS AS NEEDED
Status: DISCONTINUED | OUTPATIENT
Start: 2020-11-11 | End: 2020-11-11 | Stop reason: SURG

## 2020-11-11 RX ORDER — HYDROCODONE BITARTRATE AND ACETAMINOPHEN 5; 325 MG/1; MG/1
1 TABLET ORAL AS NEEDED
Status: COMPLETED | OUTPATIENT
Start: 2020-11-11 | End: 2020-11-11

## 2020-11-11 RX ORDER — DEXAMETHASONE SODIUM PHOSPHATE 4 MG/ML
VIAL (ML) INJECTION AS NEEDED
Status: DISCONTINUED | OUTPATIENT
Start: 2020-11-11 | End: 2020-11-11 | Stop reason: SURG

## 2020-11-11 RX ORDER — ACETAMINOPHEN 500 MG
500 TABLET ORAL EVERY 6 HOURS PRN
COMMUNITY

## 2020-11-11 RX ORDER — ACETAMINOPHEN 500 MG
1000 TABLET ORAL ONCE
Status: DISCONTINUED | OUTPATIENT
Start: 2020-11-11 | End: 2020-11-11

## 2020-11-11 RX ORDER — EPHEDRINE SULFATE 50 MG/ML
INJECTION, SOLUTION INTRAVENOUS AS NEEDED
Status: DISCONTINUED | OUTPATIENT
Start: 2020-11-11 | End: 2020-11-11 | Stop reason: SURG

## 2020-11-11 RX ORDER — ACETAMINOPHEN 500 MG
1000 TABLET ORAL ONCE AS NEEDED
Status: DISCONTINUED | OUTPATIENT
Start: 2020-11-11 | End: 2020-11-11

## 2020-11-11 RX ORDER — ONDANSETRON 2 MG/ML
4 INJECTION INTRAMUSCULAR; INTRAVENOUS AS NEEDED
Status: DISCONTINUED | OUTPATIENT
Start: 2020-11-11 | End: 2020-11-11

## 2020-11-11 RX ORDER — GLYCOPYRROLATE 0.2 MG/ML
INJECTION, SOLUTION INTRAMUSCULAR; INTRAVENOUS AS NEEDED
Status: DISCONTINUED | OUTPATIENT
Start: 2020-11-11 | End: 2020-11-11 | Stop reason: SURG

## 2020-11-11 RX ORDER — LIDOCAINE HYDROCHLORIDE 10 MG/ML
INJECTION, SOLUTION EPIDURAL; INFILTRATION; INTRACAUDAL; PERINEURAL AS NEEDED
Status: DISCONTINUED | OUTPATIENT
Start: 2020-11-11 | End: 2020-11-11 | Stop reason: SURG

## 2020-11-11 RX ORDER — KETOROLAC TROMETHAMINE 30 MG/ML
INJECTION, SOLUTION INTRAMUSCULAR; INTRAVENOUS AS NEEDED
Status: DISCONTINUED | OUTPATIENT
Start: 2020-11-11 | End: 2020-11-11 | Stop reason: SURG

## 2020-11-11 RX ORDER — HYDROCODONE BITARTRATE AND ACETAMINOPHEN 5; 325 MG/1; MG/1
1 TABLET ORAL EVERY 6 HOURS PRN
Qty: 20 TABLET | Refills: 0 | Status: SHIPPED | OUTPATIENT
Start: 2020-11-11 | End: 2020-11-19

## 2020-11-11 RX ORDER — CLINDAMYCIN PHOSPHATE 900 MG/50ML
INJECTION INTRAVENOUS
Status: DISCONTINUED
Start: 2020-11-11 | End: 2020-11-11

## 2020-11-11 RX ORDER — ONDANSETRON 2 MG/ML
INJECTION INTRAMUSCULAR; INTRAVENOUS AS NEEDED
Status: DISCONTINUED | OUTPATIENT
Start: 2020-11-11 | End: 2020-11-11 | Stop reason: SURG

## 2020-11-11 RX ORDER — BUPIVACAINE HYDROCHLORIDE AND EPINEPHRINE 5; 5 MG/ML; UG/ML
INJECTION, SOLUTION EPIDURAL; INTRACAUDAL; PERINEURAL AS NEEDED
Status: DISCONTINUED | OUTPATIENT
Start: 2020-11-11 | End: 2020-11-11 | Stop reason: HOSPADM

## 2020-11-11 RX ORDER — HEPARIN SODIUM 5000 [USP'U]/ML
5000 INJECTION, SOLUTION INTRAVENOUS; SUBCUTANEOUS ONCE
Status: COMPLETED | OUTPATIENT
Start: 2020-11-11 | End: 2020-11-11

## 2020-11-11 RX ORDER — CLINDAMYCIN PHOSPHATE 900 MG/50ML
900 INJECTION INTRAVENOUS ONCE
Status: DISCONTINUED | OUTPATIENT
Start: 2020-11-11 | End: 2020-11-11 | Stop reason: HOSPADM

## 2020-11-11 RX ORDER — NALOXONE HYDROCHLORIDE 0.4 MG/ML
80 INJECTION, SOLUTION INTRAMUSCULAR; INTRAVENOUS; SUBCUTANEOUS AS NEEDED
Status: DISCONTINUED | OUTPATIENT
Start: 2020-11-11 | End: 2020-11-11

## 2020-11-11 RX ORDER — BACITRACIN 50000 [USP'U]/1
INJECTION, POWDER, LYOPHILIZED, FOR SOLUTION INTRAMUSCULAR AS NEEDED
Status: DISCONTINUED | OUTPATIENT
Start: 2020-11-11 | End: 2020-11-11 | Stop reason: HOSPADM

## 2020-11-11 RX ADMIN — CEFAZOLIN SODIUM/WATER 2 G: 2 G/20 ML SYRINGE (ML) INTRAVENOUS at 08:12:00

## 2020-11-11 RX ADMIN — DEXAMETHASONE SODIUM PHOSPHATE 4 MG: 4 MG/ML VIAL (ML) INJECTION at 08:22:00

## 2020-11-11 RX ADMIN — LIDOCAINE HYDROCHLORIDE 50 MG: 10 INJECTION, SOLUTION EPIDURAL; INFILTRATION; INTRACAUDAL; PERINEURAL at 07:54:00

## 2020-11-11 RX ADMIN — ROCURONIUM BROMIDE 30 MG: 10 INJECTION, SOLUTION INTRAVENOUS at 07:54:00

## 2020-11-11 RX ADMIN — GLYCOPYRROLATE 0.4 MG: 0.2 INJECTION, SOLUTION INTRAMUSCULAR; INTRAVENOUS at 09:13:00

## 2020-11-11 RX ADMIN — ONDANSETRON 4 MG: 2 INJECTION INTRAMUSCULAR; INTRAVENOUS at 08:53:00

## 2020-11-11 RX ADMIN — NEOSTIGMINE METHYLSULFATE 3 MG: 1 INJECTION INTRAVENOUS at 09:13:00

## 2020-11-11 RX ADMIN — EPHEDRINE SULFATE 10 MG: 50 INJECTION, SOLUTION INTRAVENOUS at 08:04:00

## 2020-11-11 RX ADMIN — SODIUM CHLORIDE, SODIUM LACTATE, POTASSIUM CHLORIDE, CALCIUM CHLORIDE: 600; 310; 30; 20 INJECTION, SOLUTION INTRAVENOUS at 09:12:00

## 2020-11-11 RX ADMIN — EPHEDRINE SULFATE 5 MG: 50 INJECTION, SOLUTION INTRAVENOUS at 08:11:00

## 2020-11-11 RX ADMIN — KETOROLAC TROMETHAMINE 15 MG: 30 INJECTION, SOLUTION INTRAMUSCULAR; INTRAVENOUS at 09:12:00

## 2020-11-11 NOTE — ANESTHESIA PREPROCEDURE EVALUATION
PRE-OP EVALUATION    Patient Name: Anali Owens    Pre-op Diagnosis: Ventral hernia without obstruction or gangrene [K43.9]    Procedure(s):  VENTRAL INCISIONAL HERNIA REPAIR, PRIMARY     Surgeon(s) and Role:     Fran Nieves MD - Primary    Pre- 4.64 10/24/2020    HGB 14.9 10/24/2020    HCT 43.3 10/24/2020    MCV 93.3 10/24/2020    MCH 32.1 10/24/2020    MCHC 34.4 10/24/2020    RDW 13.3 10/24/2020    .0 10/24/2020     Lab Results   Component Value Date     10/24/2020    K 3.6 10/24/20

## 2020-11-11 NOTE — ANESTHESIA PROCEDURE NOTES
Airway  Date/Time: 11/11/2020 7:57 AM  Urgency: elective      General Information and Staff    Patient location during procedure: OR  Anesthesiologist: Destinee Morrow MD  Performed: anesthesiologist     Indications and Patient Condition  Indications for a

## 2020-11-11 NOTE — H&P
Active Problems      1. Lymphadenopathy    2. History of prostate cancer    3. Unintentional weight loss    4. S/P prostatectomy    5. History of cholecystectomy    6. Epigastric hernia    7. Prostate cancer ([address]    8. Gross hematuria    9.  Abdominal lymp patient does have blood in his urine. His last cystoscopy was 3 years ago. The patient also complains of a rash on his right lower quadrant. He had a blistering infection on his lower extremities.   He states that he has had allergic reactions on the of the right kidney. There is also a 3 mm hypoattenuating lesion also in the lower pole cortex of the right kidney. Both of these are too small to definitively characterize   by CT. These could reflect benign lesions such as cysts.   Since these are not supraumbilical abdominal wall hernia containing omental fat only. No bowel herniation. 2. Diverticulosis without evidence for diverticulitis. 3. Status post prostatectomy and status post cholecystectomy.   4. There is a mildly enlarged periaortic lymph status: Never Smoker      Smokeless tobacco: Never Used    Substance and Sexual Activity      Alcohol use: Never        Frequency: Never    Other Topics      Concerns:        Current Outpatient Medications:   •  Levothyroxine Sodium 25 MCG Oral Tab, Take 2 incarcerated. The patient was incidentally noted to have unintentional weight loss of greater than 10% of his body mass. The patient underwent colonoscopy by me on October 27, 2020.   The patient was noted to have colon polyps in the cecum and ascen epigastric port site from his laparoscopic cholecystectomy, there is a large 3 cm incarcerated hernia. It is slightly tender, nonreducible. We have had several revelations during today's visit.      The first is with his hematuria, and this enlarged ly

## 2020-11-11 NOTE — ANESTHESIA POSTPROCEDURE EVALUATION
[address] Patient Status:  Hospital Outpatient Surgery   Age/Gender 68year old male MRN [id number]   [facility] SURGERY Attending Dann Cummins MD   Hosp Day # 0 PCP No primary care provider on file.        Anesthe

## 2020-11-11 NOTE — OPERATIVE REPORT
[facility]  Op Note    Titus Landeros Location: OR   [facility] [id number] MRN [id number]   Admission Date 11/11/2020 Operation Date 11/11/2020   Attending Physician Austen Baca MD Operating Physician Kat Khanna MD     Pre-Operative Diagnosis: Jacquie Solomon nodes.  The largest 3.5 cm. We excised at least 4 lymph nodes in a packet. There are multiple residual enlarged lymph nodes remaining within the left axilla. A large Isidro drain was placed.   The patient was delivered to recovery room in stable condition controlled with suture ligation. We completely dissected several lymph nodes free from the left axillary fat pad, and sent them fresh to the pathologist for staging for lymphoma. The large Isidro drain was placed within the left axillary contents.   It w

## 2020-11-12 ENCOUNTER — APPOINTMENT (OUTPATIENT)
Dept: PSYCHOLOGY | Age: 80
End: 2020-11-12

## 2020-11-19 ENCOUNTER — OFFICE VISIT (OUTPATIENT)
Dept: SURGERY | Facility: CLINIC | Age: 80
End: 2020-11-19

## 2020-11-19 VITALS — DIASTOLIC BLOOD PRESSURE: 74 MMHG | SYSTOLIC BLOOD PRESSURE: 116 MMHG | HEART RATE: 82 BPM | TEMPERATURE: 99 F

## 2020-11-19 DIAGNOSIS — R63.4 UNINTENTIONAL WEIGHT LOSS: ICD-10-CM

## 2020-11-19 DIAGNOSIS — C61 PROSTATE CANCER (HCC): Primary | ICD-10-CM

## 2020-11-19 DIAGNOSIS — K43.9 EPIGASTRIC HERNIA: ICD-10-CM

## 2020-11-19 DIAGNOSIS — R59.0 ABDOMINAL LYMPHADENOPATHY: ICD-10-CM

## 2020-11-19 DIAGNOSIS — R59.0 AXILLARY LYMPHADENOPATHY: ICD-10-CM

## 2020-11-19 PROCEDURE — 99024 POSTOP FOLLOW-UP VISIT: CPT | Performed by: COLON & RECTAL SURGERY

## 2020-11-19 NOTE — PATIENT INSTRUCTIONS
This patient presented with several problems in his initial visit. He had abnormal weight loss. He had an epigastric incarcerated hernia. He had abdominal lymphadenopathy noted on CT scan.     At the time of presentation for repair of his epigastric [facility] periaortic represents metastatic prostatic cancer. None of the above appears to correlate with his unintentional weight loss of greater than 10% of his body weight.     We are sending him to Dr. Jonh Cortez, he will see him after he obtains the nuclear

## 2020-11-19 NOTE — PROGRESS NOTES
Post Operative Visit Note       Active Problems  1. Prostate cancer ([address]    2. Epigastric hernia    3. Axillary lymphadenopathy    4. Abdominal lymphadenopathy    5.  Unintentional weight loss         Chief Complaint   Patient presents with:  Hernia: 11/11 metastatic prostatic cancer. None of the above appears to correlate with his unintentional weight loss of greater than 10% of his body weight.       Collected:  11/11/2020  8:28 AM Status:  Final result   Visible to patient:  No (not released) Dx:  Melissa Hayes Date   • [facility], [facility])     • COLONOSCOPY  10/27/2020    DJP    • HERNIA SURGERY      x2   • HERNIA VENTRAL REPAIR N/A 11/11/2020    Performed by Ravindra Marroquin MD at [place] [id number]   • OTHER SURGICAL HISTORY  11/11/2020    VENTRAL INCISIONAL HERNIA R unexpected weight change. HENT: Negative for hearing loss, nosebleeds, sore throat and trouble swallowing. Respiratory: Negative for apnea, cough, shortness of breath and wheezing.     Cardiovascular: Negative for chest pain, palpitations and leg swell anesthesia and found him to have several ping-pong ball sized lymph nodes in the left axilla. We added to the procedure. I am happy to report that the final pathology on the left axillary lymphadenopathy is completely benign.   It appears to be [facility] clinic on Monday for potential drain removal of his left axillary drain. Orders Placed This Encounter      PSA, TOTAL AND FREE (DIAGNOSTIC) [[id number]] [Q]      Imaging & Referrals   None    Follow Up  No follow-ups on file.     Michelle Carmona MD

## 2020-11-20 ENCOUNTER — PATIENT OUTREACH (OUTPATIENT)
Dept: SURGERY | Facility: CLINIC | Age: 80
End: 2020-11-20

## 2020-11-20 NOTE — PROGRESS NOTES
Per the colonoscopy results sheet and op note, patient is due for a 3 year recall. Called patient. Patient informed of results. He verbalized understanding. No further questions at this time. Recall placed. HM updated.

## 2020-11-24 ENCOUNTER — HOSPITAL ENCOUNTER (OUTPATIENT)
Dept: NUCLEAR MEDICINE | Facility: HOSPITAL | Age: 80
Discharge: HOME | End: 2020-11-24
Attending: COLON & RECTAL SURGERY
Payer: MEDICARE

## 2020-11-24 ENCOUNTER — NURSE ONLY (OUTPATIENT)
Dept: SURGERY | Facility: CLINIC | Age: 80
End: 2020-11-24

## 2020-11-24 ENCOUNTER — MED REC SCAN ONLY (OUTPATIENT)
Dept: SURGERY | Facility: CLINIC | Age: 80
End: 2020-11-24

## 2020-11-24 ENCOUNTER — LAB ENCOUNTER (OUTPATIENT)
Dept: LAB | Facility: HOSPITAL | Age: 80
End: 2020-11-24
Attending: COLON & RECTAL SURGERY
Payer: MEDICARE

## 2020-11-24 VITALS
HEIGHT: 65 IN | SYSTOLIC BLOOD PRESSURE: 115 MMHG | BODY MASS INDEX: 20.33 KG/M2 | WEIGHT: 122 LBS | HEART RATE: 82 BPM | TEMPERATURE: 99 F | DIASTOLIC BLOOD PRESSURE: 72 MMHG

## 2020-11-24 DIAGNOSIS — C61 PROSTATE CANCER (HCC): ICD-10-CM

## 2020-11-24 DIAGNOSIS — R59.1 LYMPHADENOPATHY: ICD-10-CM

## 2020-11-24 DIAGNOSIS — Z85.46 HISTORY OF PROSTATE CANCER: ICD-10-CM

## 2020-11-24 DIAGNOSIS — R63.4 UNINTENTIONAL WEIGHT LOSS: Primary | ICD-10-CM

## 2020-11-24 PROCEDURE — 84153 ASSAY OF PSA TOTAL: CPT

## 2020-11-24 PROCEDURE — 36415 COLL VENOUS BLD VENIPUNCTURE: CPT

## 2020-11-24 PROCEDURE — 78815 PET IMAGE W/CT SKULL-THIGH: CPT | Performed by: COLON & RECTAL SURGERY

## 2020-11-24 PROCEDURE — 86769 SARS-COV-2 COVID-19 ANTIBODY: CPT

## 2020-11-24 NOTE — PROCEDURES
Pt here for drain removal left axilla. Incisions clean and dry, drain output less than 25ml QD. Drain pulled, patient tolerated well. Dry gauze applied to drain site to change as needed and then leave open to air.  Pt and daughter instructed to call if swel

## 2020-11-24 NOTE — PROGRESS NOTES
Post Operative Visit Note       Active Problems  No diagnosis found. Chief Complaint   Patient presents with:  Post-Op: 11/11 hernia lymph node drain removal. PT states no new issues.          History of Present Illness         Allergies  Veryl Poke is all Furoate (ARNUITY ELLIPTA IN), Inhale 1 puff into the lungs daily. , Disp: , Rfl:   •  Fluticasone Propionate 50 MCG/ACT Nasal Suspension, by Each Nare route daily. , Disp: , Rfl:   •  Azelastine HCl 0.05 % Ophthalmic Solution, Place 1 drop into both eyes alejandro NURSE

## 2020-11-27 ENCOUNTER — TELEPHONE (OUTPATIENT)
Dept: SURGERY | Facility: CLINIC | Age: 80
End: 2020-11-27

## 2020-11-30 ENCOUNTER — OFFICE VISIT (OUTPATIENT)
Dept: SURGERY | Facility: CLINIC | Age: 80
End: 2020-11-30
Payer: MEDICARE

## 2020-11-30 VITALS
OXYGEN SATURATION: 96 % | HEART RATE: 67 BPM | DIASTOLIC BLOOD PRESSURE: 72 MMHG | SYSTOLIC BLOOD PRESSURE: 144 MMHG | RESPIRATION RATE: 16 BRPM | WEIGHT: 125.38 LBS | BODY MASS INDEX: 21 KG/M2

## 2020-11-30 DIAGNOSIS — R59.0 ABDOMINAL LYMPHADENOPATHY: Primary | ICD-10-CM

## 2020-11-30 PROCEDURE — 99205 OFFICE O/P NEW HI 60 MIN: CPT | Performed by: SURGERY

## 2020-11-30 NOTE — CONSULTS
[address] [facility] Surgical Oncology        Patient Name:  Rudell Simmonds   YOB: 1940   Gender:  Male   Appt Date:  11/30/2020   Provider:  Shay Ramirez MD     PATIENT PROVIDERS  Referring Provider: Franco Donovan MD    Primary Care Provid •  Fluticasone Furoate (ARNUITY ELLIPTA IN), Inhale 1 puff into the lungs daily. , Disp: , Rfl:   •  Fluticasone Propionate 50 MCG/ACT Nasal Suspension, by Each Nare route daily. , Disp: , Rfl:   •  Azelastine HCl 0.05 % Ophthalmic Solution, Place 1 drop int • Colon Cancer Father 68   • Other (Abdominal Cancer) Brother 54      Review of Systems:  · GENERAL HEALTH: feels well, no fatigue. · SKIN: no change in mole.    · HEENT: denies pain  · RESPIRATORY: denies shortness of breath, wheezing or cough   · CARDIO 68 nuclear medicine scan. 5. There are 2 small hypoattenuating cortical lesions in the lower pole cortex of the right kidney which are too small to definitively characterize. Surveillance recommended. These could reflect benign lesions.   6. There is a [phone]

## 2020-12-01 ENCOUNTER — TELEPHONE (OUTPATIENT)
Dept: FAMILY MEDICINE CLINIC | Facility: CLINIC | Age: 80
End: 2020-12-01

## 2020-12-01 ENCOUNTER — OFFICE VISIT (OUTPATIENT)
Dept: FAMILY MEDICINE CLINIC | Facility: CLINIC | Age: 80
End: 2020-12-01
Payer: MEDICARE

## 2020-12-01 VITALS
HEART RATE: 68 BPM | SYSTOLIC BLOOD PRESSURE: 130 MMHG | BODY MASS INDEX: 20.83 KG/M2 | HEIGHT: 65 IN | DIASTOLIC BLOOD PRESSURE: 60 MMHG | TEMPERATURE: 99 F | WEIGHT: 125 LBS

## 2020-12-01 DIAGNOSIS — G47.33 OBSTRUCTIVE SLEEP APNEA SYNDROME: Primary | ICD-10-CM

## 2020-12-01 DIAGNOSIS — I10 ESSENTIAL HYPERTENSION: ICD-10-CM

## 2020-12-01 DIAGNOSIS — C61 PROSTATE CANCER (HCC): ICD-10-CM

## 2020-12-01 DIAGNOSIS — R63.4 UNINTENTIONAL WEIGHT LOSS: ICD-10-CM

## 2020-12-01 DIAGNOSIS — R59.0 AXILLARY LYMPHADENOPATHY: ICD-10-CM

## 2020-12-01 DIAGNOSIS — R59.0 ABDOMINAL LYMPHADENOPATHY: ICD-10-CM

## 2020-12-01 PROCEDURE — 99204 OFFICE O/P NEW MOD 45 MIN: CPT | Performed by: FAMILY MEDICINE

## 2020-12-01 NOTE — TELEPHONE ENCOUNTER
He leaks urine ever since his prostate surgery. He has seen different  urologists, apparently they can't do anything about it?  The PET/CT scans are going to discussed at a meeting tomorrow, a group of oncologists to see if anything needs to be done about t

## 2020-12-01 NOTE — PROGRESS NOTES
HPI:    Patient ID: Lynn Ruggiero is a 68year old male. Pt s/p biopsy L axilla  Hernia repair  C/o fatigue  Hx CPAP - no sleep eval x long time  HPI    Review of Systems   Constitutional: Positive for fatigue. Negative for chills and fever.    Respir ASSESSMENT/PLAN:   Obstructive sleep apnea syndrome  (primary encounter diagnosis)  Prostate cancer (hcc)  Unintentional weight loss  Essential hypertension  Abdominal lymphadenopathy  Axillary lymphadenopathy    No orders of the defined types were place

## 2020-12-01 NOTE — TELEPHONE ENCOUNTER
He had prostate Ca 8-12 years ago. Uses depends, he doesn't like the family to know about it. Would appreciate you talking to him next time he comes in. Please review the CT from 10/24, PET from 11/24.    She does not know the details of why he wears the de

## 2020-12-01 NOTE — TELEPHONE ENCOUNTER
Daughter has a question for Dr Aditi Claire that she wanted to ask without the patient present. She was with patient today during the visit with Dr Aditi Claire and is on the verbal release form as well. Please have Dr Aditi Claire call daughter Kayla Gutierrez.

## 2020-12-02 DIAGNOSIS — R59.0 ABDOMINAL LYMPHADENOPATHY: Primary | ICD-10-CM

## 2020-12-03 ENCOUNTER — ORDER TRANSCRIPTION (OUTPATIENT)
Dept: ADMINISTRATIVE | Facility: HOSPITAL | Age: 80
End: 2020-12-03

## 2020-12-03 ENCOUNTER — TELEPHONE (OUTPATIENT)
Dept: FAMILY MEDICINE | Age: 80
End: 2020-12-03

## 2020-12-03 DIAGNOSIS — Z01.818 PRE-PROCEDURAL EXAMINATION: Primary | ICD-10-CM

## 2020-12-07 VITALS — BODY MASS INDEX: 19.29 KG/M2 | WEIGHT: 120 LBS | HEIGHT: 66 IN

## 2020-12-10 ENCOUNTER — APPOINTMENT (OUTPATIENT)
Dept: FAMILY MEDICINE | Age: 80
End: 2020-12-10

## 2020-12-12 ENCOUNTER — LAB ENCOUNTER (OUTPATIENT)
Dept: LAB | Facility: HOSPITAL | Age: 80
End: 2020-12-12
Attending: PHYSICIAN ASSISTANT
Payer: MEDICARE

## 2020-12-12 DIAGNOSIS — Z01.818 PRE-PROCEDURAL EXAMINATION: ICD-10-CM

## 2020-12-14 ENCOUNTER — ORDER TRANSCRIPTION (OUTPATIENT)
Dept: ADMINISTRATIVE | Facility: HOSPITAL | Age: 80
End: 2020-12-14

## 2020-12-14 DIAGNOSIS — R59.0 ABDOMINAL LYMPHADENOPATHY: Primary | ICD-10-CM

## 2020-12-14 DIAGNOSIS — Z11.59 SCREENING FOR VIRAL DISEASE: ICD-10-CM

## 2020-12-14 DIAGNOSIS — Z01.818 PREOP EXAMINATION: ICD-10-CM

## 2020-12-14 RX ORDER — NALOXONE HYDROCHLORIDE 0.4 MG/ML
80 INJECTION, SOLUTION INTRAMUSCULAR; INTRAVENOUS; SUBCUTANEOUS AS NEEDED
Status: CANCELLED | OUTPATIENT
Start: 2020-12-14

## 2020-12-14 RX ORDER — FLUMAZENIL 0.1 MG/ML
0.2 INJECTION, SOLUTION INTRAVENOUS AS NEEDED
Status: CANCELLED | OUTPATIENT
Start: 2020-12-14

## 2020-12-14 RX ORDER — MIDAZOLAM HYDROCHLORIDE 1 MG/ML
1 INJECTION INTRAMUSCULAR; INTRAVENOUS EVERY 5 MIN PRN
Status: CANCELLED | OUTPATIENT
Start: 2020-12-15 | End: 2020-12-15

## 2020-12-14 RX ORDER — SODIUM CHLORIDE 9 MG/ML
INJECTION, SOLUTION INTRAVENOUS CONTINUOUS
Status: CANCELLED | OUTPATIENT
Start: 2020-12-14

## 2020-12-15 ENCOUNTER — NURSE ONLY (OUTPATIENT)
Dept: LAB | Facility: HOSPITAL | Age: 80
End: 2020-12-15
Attending: PHYSICIAN ASSISTANT
Payer: MEDICARE

## 2020-12-15 ENCOUNTER — HOSPITAL ENCOUNTER (OUTPATIENT)
Dept: ULTRASOUND IMAGING | Facility: HOSPITAL | Age: 80
Discharge: HOME | End: 2020-12-15
Attending: PHYSICIAN ASSISTANT
Payer: MEDICARE

## 2020-12-15 ENCOUNTER — HOSPITAL ENCOUNTER (OUTPATIENT)
Dept: CT IMAGING | Facility: HOSPITAL | Age: 80
Discharge: HOME | End: 2020-12-15
Attending: RADIOLOGY
Payer: MEDICARE

## 2020-12-15 VITALS
DIASTOLIC BLOOD PRESSURE: 69 MMHG | TEMPERATURE: 98 F | OXYGEN SATURATION: 100 % | RESPIRATION RATE: 21 BRPM | SYSTOLIC BLOOD PRESSURE: 128 MMHG | HEART RATE: 76 BPM

## 2020-12-15 DIAGNOSIS — R59.0 ABDOMINAL LYMPHADENOPATHY: Primary | ICD-10-CM

## 2020-12-15 DIAGNOSIS — R59.0 ABDOMINAL LYMPHADENOPATHY: ICD-10-CM

## 2020-12-15 PROCEDURE — 74150 CT ABDOMEN W/O CONTRAST: CPT | Performed by: RADIOLOGY

## 2020-12-15 PROCEDURE — 85027 COMPLETE CBC AUTOMATED: CPT

## 2020-12-15 PROCEDURE — 72192 CT PELVIS W/O DYE: CPT | Performed by: RADIOLOGY

## 2020-12-15 PROCEDURE — 36415 COLL VENOUS BLD VENIPUNCTURE: CPT

## 2020-12-15 PROCEDURE — 85610 PROTHROMBIN TIME: CPT

## 2020-12-15 RX ORDER — SODIUM CHLORIDE 9 MG/ML
INJECTION, SOLUTION INTRAVENOUS CONTINUOUS
Status: DISCONTINUED | OUTPATIENT
Start: 2020-12-15 | End: 2020-12-17

## 2020-12-15 RX ORDER — MIDAZOLAM HYDROCHLORIDE 1 MG/ML
INJECTION INTRAMUSCULAR; INTRAVENOUS
Status: DISCONTINUED
Start: 2020-12-15 | End: 2020-12-15 | Stop reason: WASHOUT

## 2020-12-15 RX ORDER — NALOXONE HYDROCHLORIDE 0.4 MG/ML
80 INJECTION, SOLUTION INTRAMUSCULAR; INTRAVENOUS; SUBCUTANEOUS AS NEEDED
Status: DISCONTINUED | OUTPATIENT
Start: 2020-12-15 | End: 2020-12-17

## 2020-12-15 RX ORDER — FLUMAZENIL 0.1 MG/ML
0.2 INJECTION, SOLUTION INTRAVENOUS AS NEEDED
Status: DISCONTINUED | OUTPATIENT
Start: 2020-12-15 | End: 2020-12-17

## 2020-12-15 RX ORDER — MIDAZOLAM HYDROCHLORIDE 1 MG/ML
1 INJECTION INTRAMUSCULAR; INTRAVENOUS EVERY 5 MIN PRN
Status: ACTIVE | OUTPATIENT
Start: 2020-12-15 | End: 2020-12-15

## 2020-12-15 RX ADMIN — SODIUM CHLORIDE: 9 INJECTION, SOLUTION INTRAVENOUS at 11:05:00

## 2020-12-15 NOTE — IMAGING NOTE
Allegra , name, date of birth and allergies verified with pt. Pt here for Retroperitoneal biopsy. . States NPO since last evening. States he took his BP med w/sips this am.  Pre procedure vitals checked. IV access established to RFA. saline lock.

## 2020-12-21 ENCOUNTER — TELEPHONE (OUTPATIENT)
Dept: SURGERY | Facility: CLINIC | Age: 80
End: 2020-12-21

## 2020-12-21 NOTE — TELEPHONE ENCOUNTER
Called patient to refer him to medical oncology. He asked for recommendation. Gave him information for Dr. Jennifer Mccarty.

## 2020-12-22 ENCOUNTER — TELEPHONE (OUTPATIENT)
Dept: HEMATOLOGY/ONCOLOGY | Facility: HOSPITAL | Age: 80
End: 2020-12-22

## 2020-12-22 NOTE — TELEPHONE ENCOUNTER
Gabino Mccullough called to schedule a consult. He was referred by Dr. Josselyn Arteaga.  Gabino Mccullough states that the tumor he has shrunk dramatically in the last two months and instead of doing a biopsy, Dr. Josselyn Arteaga wanted him to see a medical oncologist. Dr. Jannie Fenton

## 2020-12-23 ENCOUNTER — OFFICE VISIT (OUTPATIENT)
Dept: HEMATOLOGY/ONCOLOGY | Facility: HOSPITAL | Age: 80
End: 2020-12-23
Attending: INTERNAL MEDICINE
Payer: MEDICARE

## 2020-12-23 VITALS
OXYGEN SATURATION: 97 % | SYSTOLIC BLOOD PRESSURE: 146 MMHG | WEIGHT: 124.5 LBS | HEIGHT: 64.06 IN | HEART RATE: 69 BPM | BODY MASS INDEX: 21.25 KG/M2 | RESPIRATION RATE: 18 BRPM | DIASTOLIC BLOOD PRESSURE: 69 MMHG | TEMPERATURE: 97 F

## 2020-12-23 DIAGNOSIS — R59.0 AXILLARY LYMPHADENOPATHY: ICD-10-CM

## 2020-12-23 DIAGNOSIS — R53.82 CHRONIC FATIGUE: ICD-10-CM

## 2020-12-23 DIAGNOSIS — R59.0 ABDOMINAL LYMPHADENOPATHY: Primary | ICD-10-CM

## 2020-12-23 PROCEDURE — 99205 OFFICE O/P NEW HI 60 MIN: CPT | Performed by: INTERNAL MEDICINE

## 2020-12-23 NOTE — PROGRESS NOTES
Hematology/Oncology Initial Consultation Note    Patient Name: Chadwick Virk  Medical Record Number: [id number]    YOB: 1940   Date of Consultation: 12/23/2020   PCP: Dr. Ronal Valdez  Other providers: Dr. Meaghan Thompson, Dr. Yudi Trejo illness with fevers, fatigue, sore throat and malaise. He was treated with a course of antibiotics with some initial improvement but continued to have some low-grade fevers for a couple months following this.   His fatigue is gradually improved since that month.    1 small area of nonmelanoma skin cancer excised from his neck 7/2020.     He states his thyroid function labs were recently checked at an outside lab and were normal.    Past Medical History:  Past Medical History:   Diagnosis Date   • Anxiety sta OTHER (SEE COMMENTS)    Comment:rash  Ragweed                 OTHER (SEE COMMENTS)    Comment:Itching, stuffiness    Psychosocial History:  Social History    Social History Narrative      . 6 adult children.   Used to be an , f 10/24/2020    HGB 15.0 12/23/2020    HGB 15.3 12/15/2020    HGB 14.9 10/24/2020    HCT 44.4 12/23/2020    MCV 93.7 12/23/2020    MCH 31.6 12/23/2020    MCHC 33.8 12/23/2020    RDW 13.2 12/23/2020    .0 12/23/2020    .0 12/15/2020    . 0 Luana Nix MD  Hematology/Medical [address] [facility]

## 2021-01-20 ENCOUNTER — ORDER TRANSCRIPTION (OUTPATIENT)
Dept: SLEEP CENTER | Age: 81
End: 2021-01-20

## 2021-01-20 ENCOUNTER — TELEPHONE (OUTPATIENT)
Dept: FAMILY MEDICINE CLINIC | Facility: CLINIC | Age: 81
End: 2021-01-20

## 2021-01-20 DIAGNOSIS — Z01.818 PREOP EXAMINATION: Primary | ICD-10-CM

## 2021-01-20 DIAGNOSIS — Z11.59 SCREENING FOR VIRAL DISEASE: ICD-10-CM

## 2021-01-20 NOTE — TELEPHONE ENCOUNTER
Pt needs a covid test before having his sleep study done, call alesha. She temporarily scheduled him in [place] but pt would like something closer to home if possible.

## 2021-01-20 NOTE — TELEPHONE ENCOUNTER
Spoke with Grabiel Mcneal who stated she placed an order for pre-op covid testing. Informed Grabiel Mcneal that we do not do Pre-op Covid testing here. We only see symptomatic patients. Informed her that she made the correct choice in scheduling patient in [place].  Otherwise

## 2021-01-21 ENCOUNTER — OFFICE VISIT (OUTPATIENT)
Dept: FAMILY MEDICINE CLINIC | Facility: CLINIC | Age: 81
End: 2021-01-21
Payer: MEDICARE

## 2021-01-21 VITALS
RESPIRATION RATE: 16 BRPM | WEIGHT: 124.5 LBS | TEMPERATURE: 99 F | BODY MASS INDEX: 21.25 KG/M2 | HEART RATE: 72 BPM | HEIGHT: 64 IN | OXYGEN SATURATION: 98 % | DIASTOLIC BLOOD PRESSURE: 70 MMHG | SYSTOLIC BLOOD PRESSURE: 128 MMHG

## 2021-01-21 DIAGNOSIS — G47.33 OBSTRUCTIVE SLEEP APNEA SYNDROME: ICD-10-CM

## 2021-01-21 DIAGNOSIS — I10 ESSENTIAL HYPERTENSION: Primary | ICD-10-CM

## 2021-01-21 PROCEDURE — 99214 OFFICE O/P EST MOD 30 MIN: CPT | Performed by: FAMILY MEDICINE

## 2021-01-21 RX ORDER — LEVOTHYROXINE SODIUM 50 UG/1
50 TABLET ORAL DAILY
COMMUNITY
Start: 2020-11-21 | End: 2021-11-19

## 2021-01-21 NOTE — PROGRESS NOTES
HPI:    Patient ID: Negar Moody is a 68year old male. Patient undergoing sleep study. Concerned with blood pressure. Ranges on his cuff from 515 systolic to high of 80 diastolic. Without problems with medication. Breathing comfortably.   Withou Pulmonary/Chest: Effort normal and breath sounds normal.   Musculoskeletal:         General: No edema. Neurological: He is alert and oriented to person, place, and time. Skin: Skin is warm and dry. Vitals reviewed. /70   Pulse 72   Temp 99. 1

## 2021-02-06 DIAGNOSIS — Z23 NEED FOR VACCINATION: ICD-10-CM

## 2021-02-12 ENCOUNTER — LAB ENCOUNTER (OUTPATIENT)
Dept: LAB | Age: 81
End: 2021-02-12
Attending: INTERNAL MEDICINE
Payer: MEDICARE

## 2021-02-12 DIAGNOSIS — Z11.59 SCREENING FOR VIRAL DISEASE: ICD-10-CM

## 2021-02-12 DIAGNOSIS — Z01.818 PREOP EXAMINATION: ICD-10-CM

## 2021-02-13 LAB — SARS-COV-2 RNA RESP QL NAA+PROBE: NOT DETECTED

## 2021-02-15 ENCOUNTER — APPOINTMENT (OUTPATIENT)
Dept: DERMATOLOGY | Age: 81
End: 2021-02-15

## 2021-02-15 ENCOUNTER — OFFICE VISIT (OUTPATIENT)
Dept: SLEEP CENTER | Age: 81
End: 2021-02-15
Attending: INTERNAL MEDICINE
Payer: MEDICARE

## 2021-02-15 DIAGNOSIS — G47.10 HYPERSOMNIA: ICD-10-CM

## 2021-02-15 DIAGNOSIS — G47.33 OSA ON CPAP: ICD-10-CM

## 2021-02-15 DIAGNOSIS — R06.83 SNORING: ICD-10-CM

## 2021-02-15 DIAGNOSIS — J45.20 MILD INTERMITTENT ASTHMA WITHOUT COMPLICATION: ICD-10-CM

## 2021-02-15 DIAGNOSIS — Z99.89 OSA ON CPAP: ICD-10-CM

## 2021-02-15 PROCEDURE — 95811 POLYSOM 6/>YRS CPAP 4/> PARM: CPT

## 2021-02-25 ENCOUNTER — TELEPHONE (OUTPATIENT)
Dept: PSYCHOLOGY | Age: 81
End: 2021-02-25

## 2021-03-08 ENCOUNTER — APPOINTMENT (OUTPATIENT)
Dept: DERMATOLOGY | Age: 81
End: 2021-03-08

## 2021-03-08 ENCOUNTER — OFFICE VISIT (OUTPATIENT)
Dept: DERMATOLOGY | Age: 81
End: 2021-03-08

## 2021-03-08 DIAGNOSIS — L57.0 ACTINIC KERATOSES: Primary | ICD-10-CM

## 2021-03-08 PROCEDURE — 17000 DESTRUCT PREMALG LESION: CPT | Performed by: DERMATOLOGY

## 2021-03-08 PROCEDURE — 17003 DESTRUCT PREMALG LES 2-14: CPT | Performed by: DERMATOLOGY

## 2021-03-08 PROCEDURE — 99213 OFFICE O/P EST LOW 20 MIN: CPT | Performed by: DERMATOLOGY

## 2021-03-08 RX ORDER — FLUTICASONE PROPIONATE 50 MCG
2 SPRAY, SUSPENSION (ML) NASAL DAILY
COMMUNITY

## 2021-03-08 RX ORDER — TRIAMCINOLONE ACETONIDE 1 MG/G
1 OINTMENT TOPICAL DAILY
COMMUNITY
Start: 2020-12-04

## 2021-03-30 ENCOUNTER — APPOINTMENT (OUTPATIENT)
Dept: ALLERGY | Age: 81
End: 2021-03-30

## 2021-04-09 ENCOUNTER — TELEPHONE (OUTPATIENT)
Dept: ALLERGY | Age: 81
End: 2021-04-09

## 2021-04-09 ASSESSMENT — ENCOUNTER SYMPTOMS
CHEST TIGHTNESS: 0
FACIAL SWELLING: 0
APPETITE CHANGE: 0
VOMITING: 0
HEADACHES: 0
WHEEZING: 0
ABDOMINAL PAIN: 0
DIARRHEA: 0
NERVOUS/ANXIOUS: 0
ADENOPATHY: 0
SLEEP DISTURBANCE: 0
SORE THROAT: 0
FEVER: 0

## 2021-04-12 ENCOUNTER — OFFICE VISIT (OUTPATIENT)
Dept: ALLERGY | Age: 81
End: 2021-04-12

## 2021-04-12 VITALS
OXYGEN SATURATION: 98 % | WEIGHT: 124 LBS | HEART RATE: 60 BPM | HEIGHT: 64 IN | SYSTOLIC BLOOD PRESSURE: 126 MMHG | BODY MASS INDEX: 21.17 KG/M2 | TEMPERATURE: 98.4 F | DIASTOLIC BLOOD PRESSURE: 70 MMHG

## 2021-04-12 DIAGNOSIS — H10.10 ALLERGIC RHINOCONJUNCTIVITIS: ICD-10-CM

## 2021-04-12 DIAGNOSIS — J30.9 ALLERGIC RHINOCONJUNCTIVITIS: ICD-10-CM

## 2021-04-12 DIAGNOSIS — B99.9 RECURRENT INFECTIONS: ICD-10-CM

## 2021-04-12 DIAGNOSIS — J98.01 BRONCHOSPASM: Primary | ICD-10-CM

## 2021-04-12 PROCEDURE — 99214 OFFICE O/P EST MOD 30 MIN: CPT | Performed by: ALLERGY & IMMUNOLOGY

## 2021-04-12 ASSESSMENT — ENCOUNTER SYMPTOMS: COUGH: 1

## 2021-04-27 RX ORDER — ESCITALOPRAM OXALATE 5 MG/1
5 TABLET ORAL DAILY
Qty: 30 TABLET | Refills: 0 | Status: SHIPPED | OUTPATIENT
Start: 2021-04-27 | End: 2021-05-18

## 2021-04-27 NOTE — TELEPHONE ENCOUNTER
Last office visit: 1/21/21  Last refill: Externally Reported  Future Appointments   Date Time Provider Kavon Suzanne   10/5/2021  9:45 AM DO DAVID ArredondoSW [facility] [place]   4/13/2022 11:20 AM Asad Terry APN SP PULM [facility] [place]

## 2021-05-18 RX ORDER — ESCITALOPRAM OXALATE 5 MG/1
5 TABLET ORAL DAILY
Qty: 30 TABLET | Refills: 3 | Status: SHIPPED | OUTPATIENT
Start: 2021-05-18 | End: 2021-09-20

## 2021-06-11 ENCOUNTER — HOSPITAL ENCOUNTER (OUTPATIENT)
Dept: CT IMAGING | Facility: HOSPITAL | Age: 81
Discharge: HOME | End: 2021-06-11
Attending: INTERNAL MEDICINE
Payer: MEDICARE

## 2021-06-11 DIAGNOSIS — R59.0 ABDOMINAL LYMPHADENOPATHY: ICD-10-CM

## 2021-06-11 DIAGNOSIS — R59.0 AXILLARY LYMPHADENOPATHY: ICD-10-CM

## 2021-06-11 PROCEDURE — 82565 ASSAY OF CREATININE: CPT

## 2021-06-11 PROCEDURE — 71260 CT THORAX DX C+: CPT | Performed by: INTERNAL MEDICINE

## 2021-06-11 PROCEDURE — 74177 CT ABD & PELVIS W/CONTRAST: CPT | Performed by: INTERNAL MEDICINE

## 2021-06-16 ENCOUNTER — TELEPHONE (OUTPATIENT)
Dept: HEMATOLOGY/ONCOLOGY | Facility: HOSPITAL | Age: 81
End: 2021-06-16

## 2021-06-16 NOTE — TELEPHONE ENCOUNTER
Imaging reviewed in our multidisciplinary GI tumor board today. Has a new enlarged retroperitoneal lymph node anterior to the IVC measuring up to 5.5 cm. Other lymph nodes previously seen are small and stable or decreased in size.   Consensus recommendati

## 2021-06-19 PROBLEM — R93.3 ABNORMAL FINDING ON GI TRACT IMAGING: Status: ACTIVE | Noted: 2021-06-19

## 2021-06-21 ENCOUNTER — TELEPHONE (OUTPATIENT)
Dept: HEMATOLOGY/ONCOLOGY | Facility: HOSPITAL | Age: 81
End: 2021-06-21

## 2021-06-21 NOTE — TELEPHONE ENCOUNTER
MD Sonal Fernando, Lupillo Fraire, RN  Caller: Unspecified (Today,  3:53 PM)  Looks like he is scheduled for this in early July, unless he has other questions or concerns that he wants addressed now by me, I think it would be best for him to push back hi

## 2021-06-21 NOTE — TELEPHONE ENCOUNTER
Pt called M stating he has apt with Dr. Rima Vaz on 6/28. States Dr. Ronald Berry has not done the procedure yet that was to be discussed at apt with Dr. Rima Vaz. Pt asking what he should do and if he needs to delay his apt on 6/28.

## 2021-06-23 ENCOUNTER — TELEPHONE (OUTPATIENT)
Dept: HEMATOLOGY/ONCOLOGY | Facility: HOSPITAL | Age: 81
End: 2021-06-23

## 2021-06-28 ENCOUNTER — APPOINTMENT (OUTPATIENT)
Dept: HEMATOLOGY/ONCOLOGY | Facility: HOSPITAL | Age: 81
End: 2021-06-28
Attending: INTERNAL MEDICINE
Payer: MEDICARE

## 2021-07-06 ENCOUNTER — ANESTHESIA (OUTPATIENT)
Dept: ENDOSCOPY | Facility: HOSPITAL | Age: 81
End: 2021-07-06
Payer: MEDICARE

## 2021-07-06 ENCOUNTER — HOSPITAL ENCOUNTER (OUTPATIENT)
Facility: HOSPITAL | Age: 81
Setting detail: HOSPITAL OUTPATIENT SURGERY
Discharge: HOME | End: 2021-07-06
Attending: INTERNAL MEDICINE | Admitting: INTERNAL MEDICINE
Payer: MEDICARE

## 2021-07-06 ENCOUNTER — ANESTHESIA EVENT (OUTPATIENT)
Dept: ENDOSCOPY | Facility: HOSPITAL | Age: 81
End: 2021-07-06
Payer: MEDICARE

## 2021-07-06 VITALS
HEIGHT: 65 IN | DIASTOLIC BLOOD PRESSURE: 81 MMHG | RESPIRATION RATE: 18 BRPM | HEART RATE: 65 BPM | BODY MASS INDEX: 20.49 KG/M2 | TEMPERATURE: 98 F | OXYGEN SATURATION: 100 % | SYSTOLIC BLOOD PRESSURE: 135 MMHG | WEIGHT: 123 LBS

## 2021-07-06 DIAGNOSIS — R93.3 ABNORMAL FINDING ON GI TRACT IMAGING: Primary | ICD-10-CM

## 2021-07-06 DIAGNOSIS — R93.3 ABNORMAL FINDINGS ON EXAMINATION OF GASTROINTESTINAL TRACT: ICD-10-CM

## 2021-07-06 PROCEDURE — 07DC8ZX EXTRACTION OF PELVIS LYMPHATIC, VIA NATURAL OR ARTIFICIAL OPENING ENDOSCOPIC, DIAGNOSTIC: ICD-10-PCS | Performed by: INTERNAL MEDICINE

## 2021-07-06 PROCEDURE — 88341 IMHCHEM/IMCYTCHM EA ADD ANTB: CPT | Performed by: INTERNAL MEDICINE

## 2021-07-06 PROCEDURE — 88305 TISSUE EXAM BY PATHOLOGIST: CPT | Performed by: INTERNAL MEDICINE

## 2021-07-06 PROCEDURE — 88342 IMHCHEM/IMCYTCHM 1ST ANTB: CPT | Performed by: INTERNAL MEDICINE

## 2021-07-06 PROCEDURE — 88173 CYTOPATH EVAL FNA REPORT: CPT | Performed by: INTERNAL MEDICINE

## 2021-07-06 PROCEDURE — 88312 SPECIAL STAINS GROUP 1: CPT | Performed by: INTERNAL MEDICINE

## 2021-07-06 PROCEDURE — 88321 CONSLTJ&REPRT SLD PREP ELSWR: CPT | Performed by: INTERNAL MEDICINE

## 2021-07-06 PROCEDURE — 88172 CYTP DX EVAL FNA 1ST EA SITE: CPT | Performed by: INTERNAL MEDICINE

## 2021-07-06 RX ORDER — LIDOCAINE HYDROCHLORIDE 10 MG/ML
INJECTION, SOLUTION EPIDURAL; INFILTRATION; INTRACAUDAL; PERINEURAL AS NEEDED
Status: DISCONTINUED | OUTPATIENT
Start: 2021-07-06 | End: 2021-07-06 | Stop reason: SURG

## 2021-07-06 RX ORDER — NALOXONE HYDROCHLORIDE 0.4 MG/ML
80 INJECTION, SOLUTION INTRAMUSCULAR; INTRAVENOUS; SUBCUTANEOUS AS NEEDED
Status: DISCONTINUED | OUTPATIENT
Start: 2021-07-06 | End: 2021-07-06

## 2021-07-06 RX ORDER — SODIUM CHLORIDE, SODIUM LACTATE, POTASSIUM CHLORIDE, CALCIUM CHLORIDE 600; 310; 30; 20 MG/100ML; MG/100ML; MG/100ML; MG/100ML
INJECTION, SOLUTION INTRAVENOUS CONTINUOUS
Status: DISCONTINUED | OUTPATIENT
Start: 2021-07-06 | End: 2021-07-06

## 2021-07-06 RX ORDER — HYDROMORPHONE HYDROCHLORIDE 1 MG/ML
0.4 INJECTION, SOLUTION INTRAMUSCULAR; INTRAVENOUS; SUBCUTANEOUS EVERY 5 MIN PRN
Status: DISCONTINUED | OUTPATIENT
Start: 2021-07-06 | End: 2021-07-06

## 2021-07-06 RX ORDER — HYDROCODONE BITARTRATE AND ACETAMINOPHEN 10; 325 MG/1; MG/1
2 TABLET ORAL AS NEEDED
Status: DISCONTINUED | OUTPATIENT
Start: 2021-07-06 | End: 2021-07-06

## 2021-07-06 RX ORDER — ONDANSETRON 2 MG/ML
4 INJECTION INTRAMUSCULAR; INTRAVENOUS AS NEEDED
Status: DISCONTINUED | OUTPATIENT
Start: 2021-07-06 | End: 2021-07-06

## 2021-07-06 RX ORDER — METOCLOPRAMIDE HYDROCHLORIDE 5 MG/ML
10 INJECTION INTRAMUSCULAR; INTRAVENOUS AS NEEDED
Status: DISCONTINUED | OUTPATIENT
Start: 2021-07-06 | End: 2021-07-06

## 2021-07-06 RX ORDER — HYDROCODONE BITARTRATE AND ACETAMINOPHEN 10; 325 MG/1; MG/1
1 TABLET ORAL AS NEEDED
Status: DISCONTINUED | OUTPATIENT
Start: 2021-07-06 | End: 2021-07-06

## 2021-07-06 RX ADMIN — SODIUM CHLORIDE, SODIUM LACTATE, POTASSIUM CHLORIDE, CALCIUM CHLORIDE: 600; 310; 30; 20 INJECTION, SOLUTION INTRAVENOUS at 10:46:00

## 2021-07-06 RX ADMIN — SODIUM CHLORIDE, SODIUM LACTATE, POTASSIUM CHLORIDE, CALCIUM CHLORIDE: 600; 310; 30; 20 INJECTION, SOLUTION INTRAVENOUS at 11:08:00

## 2021-07-06 RX ADMIN — LIDOCAINE HYDROCHLORIDE 25 MG: 10 INJECTION, SOLUTION EPIDURAL; INFILTRATION; INTRACAUDAL; PERINEURAL at 10:46:00

## 2021-07-06 NOTE — ANESTHESIA POSTPROCEDURE EVALUATION
[address] Patient Status:  Hospital Outpatient Surgery   Age/Gender 68year old male MRN [id number]   Location [address] [place] [address] Attending Julieth Henry MD   [facility] Day # 0 PCP DO Andrew Valadez

## 2021-07-06 NOTE — OPERATIVE REPORT
Anand Geronimo Patient Status:  Hospital Outpatient Surgery    1940 MRN [id number]   Location [id number] [place] [address] Attending Sona Willard MD   Date 2021 PCP Korey Mcelroy DO     PREOPERATIVE DIAGNOSIS/INDICATION: Lymp retroperitoneal LN  RECOMMENDATIONS:   - Post EUS/FNAprecautions, watch for bleeding, infection, perforation, adverse drug reactions and pancreatitis. - Call status report post procedure. - Follow cytopathology results.     Kayla Dutton  7/6/2021  11:

## 2021-07-06 NOTE — H&P
[address] [facility] Patient Status:  Hospital Outpatient Surgery    1940 MRN [id number]   Location [facility] Attending Varghese Mckeon MD   Date 2021 PCP Shilpa Carvajal DO     CC: Weight los 10/27/2020    DJP    • HERNIA SURGERY      x2   • LAPAROSCOPY, SURGICAL PROSTATECTOMY, RETROPUBIC RADICAL, W/NERVE SPARING  2008   • OTHER SURGICAL HISTORY  11/11/2020    VENTRAL INCISIONAL HERNIA REPAIR, PRIMARY, LEFT AXILLARY LYMPH NODE EXCISIONAL BIOPSY

## 2021-07-06 NOTE — ANESTHESIA PREPROCEDURE EVALUATION
PRE-OP EVALUATION    Patient Name: Jaja Argueta    Admit Diagnosis: Abnormal findings on examination of gastrointestinal tract [R93.3]    Pre-op Diagnosis: Abnormal findings on examination of gastrointestinal tract [R93.3]    ENDOSCOPIC ULTRASOUND (E Evaluation    Patient summary reviewed.     Anesthetic Complications           GI/Hepatic/Renal                                 Cardiovascular                  (+) hypertension                                     Endo/Other

## 2021-07-08 ENCOUNTER — APPOINTMENT (OUTPATIENT)
Dept: HEMATOLOGY/ONCOLOGY | Facility: HOSPITAL | Age: 81
End: 2021-07-08
Attending: INTERNAL MEDICINE
Payer: MEDICARE

## 2021-07-09 ENCOUNTER — TELEPHONE (OUTPATIENT)
Dept: HEMATOLOGY/ONCOLOGY | Facility: HOSPITAL | Age: 81
End: 2021-07-09

## 2021-07-09 LAB — NON GYNE INTERPRETATION: NEGATIVE

## 2021-07-09 NOTE — TELEPHONE ENCOUNTER
Spoke with patient. Results in process. Reminded patient that MD will discuss results further in depth at upcoming appointment. Patient verbalized understanding.

## 2021-07-09 NOTE — TELEPHONE ENCOUNTER
Patient called and would like someone to call him back to discuss test results, informed patient they are in clinic so someone will get back to you after clinic hours.  07/09/21 Janay Omalley

## 2021-07-14 ENCOUNTER — APPOINTMENT (OUTPATIENT)
Dept: HEMATOLOGY/ONCOLOGY | Facility: HOSPITAL | Age: 81
End: 2021-07-14
Payer: MEDICARE

## 2021-07-16 ENCOUNTER — TELEPHONE (OUTPATIENT)
Dept: HEMATOLOGY/ONCOLOGY | Facility: HOSPITAL | Age: 81
End: 2021-07-16

## 2021-07-16 NOTE — TELEPHONE ENCOUNTER
Pt called [facility] wanting to let Dr. Asuncion Santana know he spoke with his sister and they believe they gave the wrong cancer dx for pts brother who  21 years ago. They believe he had peritoneal cancer. Pt states he was exposed to asbestos in the [address] [facility] and [place].  Pt

## 2021-07-19 ENCOUNTER — TELEPHONE (OUTPATIENT)
Dept: HEMATOLOGY/ONCOLOGY | Facility: HOSPITAL | Age: 81
End: 2021-07-19

## 2021-07-19 DIAGNOSIS — R59.0 ABDOMINAL LYMPHADENOPATHY: Primary | ICD-10-CM

## 2021-07-19 NOTE — TELEPHONE ENCOUNTER
Discussed with pathologist today, [facility] review of retroperitoneal lymph node biopsy was unable to reach a diagnosis but is worrisome for involvement by lymphoma given atypical lymphoid infiltrate.   T-cell receptor gene rearrangement was equivocal.  B

## 2021-07-20 NOTE — TELEPHONE ENCOUNTER
LVM cancer unknown, need to complete PET to determine where next biopsy will be. Pt has PET scheduled already. Call if questions.

## 2021-07-22 ENCOUNTER — HOSPITAL ENCOUNTER (OUTPATIENT)
Dept: NUCLEAR MEDICINE | Facility: HOSPITAL | Age: 81
Discharge: HOME | End: 2021-07-22
Attending: INTERNAL MEDICINE
Payer: MEDICARE

## 2021-07-22 DIAGNOSIS — R59.0 ABDOMINAL LYMPHADENOPATHY: ICD-10-CM

## 2021-07-22 LAB — GLUCOSE BLD-MCNC: 106 MG/DL (ref 70–99)

## 2021-07-22 PROCEDURE — 78815 PET IMAGE W/CT SKULL-THIGH: CPT | Performed by: INTERNAL MEDICINE

## 2021-07-22 PROCEDURE — 82962 GLUCOSE BLOOD TEST: CPT

## 2021-07-23 ENCOUNTER — TELEPHONE (OUTPATIENT)
Dept: HEMATOLOGY/ONCOLOGY | Facility: HOSPITAL | Age: 81
End: 2021-07-23

## 2021-07-23 NOTE — TELEPHONE ENCOUNTER
Reviewed PET/CT scan. Looks like the same retroperitoneal lymph node that was previously biopsied by EUS is the only particularly suspicious lymph node.   The inguinal lymph node is not enlarged by size criteria and therefore the certainly less suspicious

## 2021-07-23 NOTE — TELEPHONE ENCOUNTER
Karla Tinsley is returning Dr Saint Dauer call. I did let him know what dr Chevy Cabrera said in his message.  pilar

## 2021-07-28 ENCOUNTER — TELEPHONE (OUTPATIENT)
Dept: HEMATOLOGY/ONCOLOGY | Facility: HOSPITAL | Age: 81
End: 2021-07-28

## 2021-07-28 DIAGNOSIS — R59.0 ABDOMINAL LYMPHADENOPATHY: Primary | ICD-10-CM

## 2021-07-28 NOTE — TELEPHONE ENCOUNTER
Case discussed in our multidisciplinary GI tumor board. Recommendation for CT-guided biopsy of enlarged FDG avid retroperitoneal lymph node is favored. I called and discussed with Ruperto Showers, he is agreeable.   If unable to reach a definitive diagnosis with CT-

## 2021-08-02 ENCOUNTER — LAB ENCOUNTER (OUTPATIENT)
Dept: LAB | Age: 81
End: 2021-08-02
Attending: INTERNAL MEDICINE
Payer: MEDICARE

## 2021-08-02 DIAGNOSIS — R59.0 ABDOMINAL LYMPHADENOPATHY: ICD-10-CM

## 2021-08-02 DIAGNOSIS — Z85.46 HISTORY OF PROSTATE CANCER: ICD-10-CM

## 2021-08-02 DIAGNOSIS — R59.1 LYMPHADENOPATHY: ICD-10-CM

## 2021-08-02 LAB
ALBUMIN SERPL-MCNC: 3.5 G/DL (ref 3.4–5)
ALBUMIN/GLOB SERPL: 1.2 {RATIO} (ref 1–2)
ALP LIVER SERPL-CCNC: 87 U/L
ALT SERPL-CCNC: 21 U/L
ANION GAP SERPL CALC-SCNC: 7 MMOL/L (ref 0–18)
AST SERPL-CCNC: 19 U/L (ref 15–37)
BASOPHILS # BLD AUTO: 0.06 X10(3) UL (ref 0–0.2)
BASOPHILS NFR BLD AUTO: 1.2 %
BILIRUB SERPL-MCNC: 1 MG/DL (ref 0.1–2)
BUN BLD-MCNC: 18 MG/DL (ref 7–18)
BUN/CREAT SERPL: 19.8 (ref 10–20)
CALCIUM BLD-MCNC: 9.1 MG/DL (ref 8.5–10.1)
CHLORIDE SERPL-SCNC: 110 MMOL/L (ref 98–112)
CO2 SERPL-SCNC: 24 MMOL/L (ref 21–32)
CREAT BLD-MCNC: 0.91 MG/DL
DEPRECATED RDW RBC AUTO: 45.3 FL (ref 35.1–46.3)
EOSINOPHIL # BLD AUTO: 0.18 X10(3) UL (ref 0–0.7)
EOSINOPHIL NFR BLD AUTO: 3.5 %
ERYTHROCYTE [DISTWIDTH] IN BLOOD BY AUTOMATED COUNT: 13 % (ref 11–15)
GLOBULIN PLAS-MCNC: 3 G/DL (ref 2.8–4.4)
GLUCOSE BLD-MCNC: 96 MG/DL (ref 70–99)
HCT VFR BLD AUTO: 43.2 %
HGB BLD-MCNC: 14.6 G/DL
IMM GRANULOCYTES # BLD AUTO: 0.02 X10(3) UL (ref 0–1)
IMM GRANULOCYTES NFR BLD: 0.4 %
LDH SERPL L TO P-CCNC: 217 U/L
LYMPHOCYTES # BLD AUTO: 0.69 X10(3) UL (ref 1–4)
LYMPHOCYTES NFR BLD AUTO: 13.4 %
M PROTEIN MFR SERPL ELPH: 6.5 G/DL (ref 6.4–8.2)
MCH RBC QN AUTO: 32.4 PG (ref 26–34)
MCHC RBC AUTO-ENTMCNC: 33.8 G/DL (ref 31–37)
MCV RBC AUTO: 96 FL
MONOCYTES # BLD AUTO: 0.49 X10(3) UL (ref 0.1–1)
MONOCYTES NFR BLD AUTO: 9.5 %
NEUTROPHILS # BLD AUTO: 3.71 X10 (3) UL (ref 1.5–7.7)
NEUTROPHILS # BLD AUTO: 3.71 X10(3) UL (ref 1.5–7.7)
NEUTROPHILS NFR BLD AUTO: 72 %
OSMOLALITY SERPL CALC.SUM OF ELEC: 294 MOSM/KG (ref 275–295)
PATIENT FASTING Y/N/NP: YES
PLATELET # BLD AUTO: 175 10(3)UL (ref 150–450)
POTASSIUM SERPL-SCNC: 3.7 MMOL/L (ref 3.5–5.1)
PSA SERPL-MCNC: <0.01 NG/ML (ref ?–4)
RBC # BLD AUTO: 4.5 X10(6)UL
SODIUM SERPL-SCNC: 141 MMOL/L (ref 136–145)
WBC # BLD AUTO: 5.2 X10(3) UL (ref 4–11)

## 2021-08-02 PROCEDURE — 85025 COMPLETE CBC W/AUTO DIFF WBC: CPT

## 2021-08-02 PROCEDURE — 82232 ASSAY OF BETA-2 PROTEIN: CPT

## 2021-08-02 PROCEDURE — 80053 COMPREHEN METABOLIC PANEL: CPT

## 2021-08-02 PROCEDURE — 36415 COLL VENOUS BLD VENIPUNCTURE: CPT

## 2021-08-02 PROCEDURE — 84153 ASSAY OF PSA TOTAL: CPT

## 2021-08-02 PROCEDURE — 83615 LACTATE (LD) (LDH) ENZYME: CPT

## 2021-08-03 LAB
B2 MICROGLOB SERPL-MCNC: 0.25 MG/DL (ref 0.11–0.25)
SARS-COV-2 RNA RESP QL NAA+PROBE: NOT DETECTED

## 2021-08-05 ENCOUNTER — APPOINTMENT (OUTPATIENT)
Dept: CT IMAGING | Facility: HOSPITAL | Age: 81
End: 2021-08-05
Attending: INTERNAL MEDICINE
Payer: MEDICARE

## 2021-08-05 ENCOUNTER — HOSPITAL ENCOUNTER (OUTPATIENT)
Dept: CT IMAGING | Facility: HOSPITAL | Age: 81
Discharge: HOME | End: 2021-08-05
Attending: INTERNAL MEDICINE
Payer: MEDICARE

## 2021-08-05 ENCOUNTER — NURSE ONLY (OUTPATIENT)
Dept: LAB | Facility: HOSPITAL | Age: 81
End: 2021-08-05
Attending: INTERNAL MEDICINE
Payer: MEDICARE

## 2021-08-05 VITALS
BODY MASS INDEX: 19.99 KG/M2 | HEIGHT: 65 IN | RESPIRATION RATE: 16 BRPM | HEART RATE: 63 BPM | WEIGHT: 120 LBS | OXYGEN SATURATION: 99 % | DIASTOLIC BLOOD PRESSURE: 66 MMHG | TEMPERATURE: 98 F | SYSTOLIC BLOOD PRESSURE: 114 MMHG

## 2021-08-05 DIAGNOSIS — R59.0 ABDOMINAL LYMPHADENOPATHY: Primary | ICD-10-CM

## 2021-08-05 DIAGNOSIS — R59.0 ABDOMINAL LYMPHADENOPATHY: ICD-10-CM

## 2021-08-05 LAB
INR BLD: 1.03 (ref 0.89–1.11)
PSA SERPL DL<=0.01 NG/ML-MCNC: 13.7 SECONDS (ref 12.2–14.5)

## 2021-08-05 PROCEDURE — 49180 BIOPSY ABDOMINAL MASS: CPT | Performed by: INTERNAL MEDICINE

## 2021-08-05 PROCEDURE — 88185 FLOWCYTOMETRY/TC ADD-ON: CPT | Performed by: INTERNAL MEDICINE

## 2021-08-05 PROCEDURE — 88184 FLOWCYTOMETRY/ TC 1 MARKER: CPT | Performed by: INTERNAL MEDICINE

## 2021-08-05 PROCEDURE — 88321 CONSLTJ&REPRT SLD PREP ELSWR: CPT | Performed by: INTERNAL MEDICINE

## 2021-08-05 PROCEDURE — 85610 PROTHROMBIN TIME: CPT

## 2021-08-05 PROCEDURE — 77012 CT SCAN FOR NEEDLE BIOPSY: CPT | Performed by: INTERNAL MEDICINE

## 2021-08-05 PROCEDURE — 99152 MOD SED SAME PHYS/QHP 5/>YRS: CPT | Performed by: INTERNAL MEDICINE

## 2021-08-05 PROCEDURE — 88307 TISSUE EXAM BY PATHOLOGIST: CPT | Performed by: INTERNAL MEDICINE

## 2021-08-05 PROCEDURE — 99153 MOD SED SAME PHYS/QHP EA: CPT | Performed by: INTERNAL MEDICINE

## 2021-08-05 PROCEDURE — 36415 COLL VENOUS BLD VENIPUNCTURE: CPT

## 2021-08-05 RX ORDER — FLUMAZENIL 0.1 MG/ML
0.2 INJECTION, SOLUTION INTRAVENOUS AS NEEDED
Status: DISCONTINUED | OUTPATIENT
Start: 2021-08-05 | End: 2021-08-07

## 2021-08-05 RX ORDER — HYDROCODONE BITARTRATE AND ACETAMINOPHEN 5; 325 MG/1; MG/1
1 TABLET ORAL EVERY 4 HOURS PRN
Status: DISCONTINUED | OUTPATIENT
Start: 2021-08-05 | End: 2021-08-07

## 2021-08-05 RX ORDER — SODIUM CHLORIDE 9 MG/ML
INJECTION, SOLUTION INTRAVENOUS CONTINUOUS
Status: DISCONTINUED | OUTPATIENT
Start: 2021-08-05 | End: 2021-08-07

## 2021-08-05 RX ORDER — DIPHENHYDRAMINE HYDROCHLORIDE 50 MG/ML
50 INJECTION INTRAMUSCULAR; INTRAVENOUS ONCE AS NEEDED
Status: ACTIVE | OUTPATIENT
Start: 2021-08-05 | End: 2021-08-05

## 2021-08-05 RX ORDER — ONDANSETRON 2 MG/ML
4 INJECTION INTRAMUSCULAR; INTRAVENOUS ONCE AS NEEDED
Status: ACTIVE | OUTPATIENT
Start: 2021-08-05 | End: 2021-08-05

## 2021-08-05 RX ORDER — NALOXONE HYDROCHLORIDE 0.4 MG/ML
80 INJECTION, SOLUTION INTRAMUSCULAR; INTRAVENOUS; SUBCUTANEOUS AS NEEDED
Status: DISCONTINUED | OUTPATIENT
Start: 2021-08-05 | End: 2021-08-07

## 2021-08-05 RX ORDER — ACETAMINOPHEN 325 MG/1
650 TABLET ORAL EVERY 6 HOURS PRN
Status: DISCONTINUED | OUTPATIENT
Start: 2021-08-05 | End: 2021-08-07

## 2021-08-05 RX ORDER — MIDAZOLAM HYDROCHLORIDE 1 MG/ML
1 INJECTION INTRAMUSCULAR; INTRAVENOUS EVERY 5 MIN PRN
Status: ACTIVE | OUTPATIENT
Start: 2021-08-05 | End: 2021-08-05

## 2021-08-05 RX ORDER — MORPHINE SULFATE 2 MG/ML
2 INJECTION, SOLUTION INTRAMUSCULAR; INTRAVENOUS EVERY 2 HOUR PRN
Status: DISCONTINUED | OUTPATIENT
Start: 2021-08-05 | End: 2021-08-07

## 2021-08-05 RX ORDER — MIDAZOLAM HYDROCHLORIDE 1 MG/ML
INJECTION INTRAMUSCULAR; INTRAVENOUS
Status: COMPLETED
Start: 2021-08-05 | End: 2021-08-05

## 2021-08-05 RX ADMIN — SODIUM CHLORIDE: 9 INJECTION, SOLUTION INTRAVENOUS at 09:28:00

## 2021-08-05 RX ADMIN — MIDAZOLAM HYDROCHLORIDE 1 MG: 1 INJECTION INTRAMUSCULAR; INTRAVENOUS at 10:28:00

## 2021-08-05 NOTE — IMAGING NOTE
Jac Cheeks, name, date of birth and allergies verified with pt. Pt here for CT-guided retroperitoneal lymph node mass biopsy. .  Informed consent obtained by Dr Kali Dick. Positioned pt  Supine on scanner. Universal protocol performed.   Site preppe

## 2021-08-05 NOTE — H&P
[address] Patient Status:  Outpatient    1940 MRN [id number]   [facility] Attending Sana Chow MD   Hosp Day # 0 PCP Sade Tsang DO         Admitting Diagno • CATARACTS, OPHTHM (DMG)     • CHOLECYSTECTOMY  2011?    • COLONOSCOPY  10/27/2020    DJP    • HERNIA SURGERY      x2   • LAPAROSCOPY, SURGICAL PROSTATECTOMY, RETROPUBIC RADICAL, W/NERVE SPARING  2008   • OTHER SURGICAL HISTORY  11/11/2020    VENTRAL I Currently       Family History:  Family History   Problem Relation Age of Onset   • Cancer Mother 48        Brain   • Colon Cancer Father 68   • Cancer Brother 54        Mesothelioma         Physical Exam:    Ht 65\"   Wt 120 lb   BMI 19.97 kg/m²     Gener

## 2021-08-05 NOTE — PROCEDURES
[facility]  Pre-Procedure Note    Name: Kay Carmona  MRN#: [id number]  : 1940    Procedure:  CT Guided Retroperitoneal lymph node biopsy    Indication: PET positive retroperitoneal lymph node.      Allergies:      Penicillins

## 2021-08-05 NOTE — PROCEDURES
[address]  Patient Status:  Outpatient    1940 MRN [id number]   [facility] Attending David Hull MD   Hosp Day # 0 PCP Crista Barbosa DO         Brief Procedure Report    Pre-Operative Diagnosis: PET

## 2021-08-19 ENCOUNTER — OFFICE VISIT (OUTPATIENT)
Dept: HEMATOLOGY/ONCOLOGY | Facility: HOSPITAL | Age: 81
End: 2021-08-19
Attending: INTERNAL MEDICINE
Payer: MEDICARE

## 2021-08-19 VITALS
BODY MASS INDEX: 22 KG/M2 | OXYGEN SATURATION: 98 % | HEART RATE: 71 BPM | TEMPERATURE: 99 F | DIASTOLIC BLOOD PRESSURE: 78 MMHG | RESPIRATION RATE: 18 BRPM | WEIGHT: 134.5 LBS | SYSTOLIC BLOOD PRESSURE: 145 MMHG

## 2021-08-19 DIAGNOSIS — R53.82 CHRONIC FATIGUE: ICD-10-CM

## 2021-08-19 DIAGNOSIS — R59.0 ABDOMINAL LYMPHADENOPATHY: Primary | ICD-10-CM

## 2021-08-19 DIAGNOSIS — R59.0 AXILLARY LYMPHADENOPATHY: ICD-10-CM

## 2021-08-19 PROCEDURE — 99215 OFFICE O/P EST HI 40 MIN: CPT | Performed by: INTERNAL MEDICINE

## 2021-08-19 NOTE — PROGRESS NOTES
Hematology/Oncology Clinic Follow Up Visit    Patient Name: Negar Wall  Medical Record Number: [id number]    YOB: 1940    PCP: Dr. Jazlyn Cheatham  Other providers: Dr. Violette Sarkar, Dr. Harriet Cheung, Dr. Darlyn Henderson    Reason for Co [facility]. The biopsy shows an infiltrate of T cells which stain as CD3 positive T cells and coexpress CD2, CD5 and variable CD30. CD4 and CD8 do not appear to be coexpressed and CD7 is not expressed.   Slightly increased numbers of background CD20 and intermixed with numerous histiocytes and scattered intermediate sized immunoblastic cells.  There are few intermixed eosinophils.   Immunoperoxidase stains were performed at [facility] in [place], [place] (block A1: CD3, CD4, CD7, CD8, CD20, CD21, CD30,  times has been able to palpate a left axillary lymph node as well. He denies any bleeding. No abdominal pain, back pain, weakness, headache or neurologic changes.     Past Medical History:  Past Medical History:   Diagnosis Date   • Abdominal hernia     S AXILLARY LYMPH NODE EXCISIONAL BIOPSY   • REMOVAL GALLBLADDER     • REPAIR ING HERNIA,5+Y/O,REDUCIBL     • TONSILLECTOMY         Home Medications:  Diclofenac Sodium 1 % External Gel, Apply 2 g topically daily as needed.   , Disp: , Rfl:   triamcinolone ace Brother 54        Mesothelioma     Review of Systems:  A 10-point ROS was done with pertinent positives and negative per the HPI    Vital Signs:  Height: --  Weight: 61 kg (134 lb 8 oz) (08/19 0907)  BSA (Calculated - sq m): --  Pulse: 71 (08/19 0907)  BP: 3.5 08/02/2021    GLOBULIN 3.0 08/02/2021     08/02/2021    K 3.7 08/02/2021     08/02/2021    CO2 24.0 08/02/2021     Lab Results   Component Value Date    INR 1.03 08/05/2021     Lab Results   Component Value Date     08/02/2021    LDH

## 2021-08-19 NOTE — PROGRESS NOTES
Education Record    Learner:  Patient    Disease / Diagnosis:lymphadenopathy    Barriers / Limitations:  None   Comments:    Method:  Discussion   Comments:    General Topics:  Plan of care reviewed   Comments:    Outcome:  Shows understanding   Comments:h

## 2021-08-23 ENCOUNTER — OFFICE VISIT (OUTPATIENT)
Dept: SURGERY | Facility: CLINIC | Age: 81
End: 2021-08-23
Payer: MEDICARE

## 2021-08-23 ENCOUNTER — LAB ENCOUNTER (OUTPATIENT)
Dept: LAB | Age: 81
End: 2021-08-23
Attending: SURGERY
Payer: MEDICARE

## 2021-08-23 VITALS
HEART RATE: 63 BPM | TEMPERATURE: 97 F | SYSTOLIC BLOOD PRESSURE: 154 MMHG | OXYGEN SATURATION: 96 % | DIASTOLIC BLOOD PRESSURE: 76 MMHG | RESPIRATION RATE: 16 BRPM | BODY MASS INDEX: 21 KG/M2 | WEIGHT: 128 LBS

## 2021-08-23 DIAGNOSIS — Z01.818 PRE-OP TESTING: ICD-10-CM

## 2021-08-23 DIAGNOSIS — R59.0 INGUINAL LYMPHADENOPATHY: Primary | ICD-10-CM

## 2021-08-23 DIAGNOSIS — R59.0 ABDOMINAL LYMPHADENOPATHY: ICD-10-CM

## 2021-08-23 PROCEDURE — 99215 OFFICE O/P EST HI 40 MIN: CPT | Performed by: SURGERY

## 2021-08-23 PROCEDURE — 93010 ELECTROCARDIOGRAM REPORT: CPT | Performed by: SURGERY

## 2021-08-23 PROCEDURE — 93005 ELECTROCARDIOGRAM TRACING: CPT

## 2021-08-23 NOTE — PATIENT INSTRUCTIONS
Surgery:  Left inguinal lymph node biopsy    Date of Surgery:  8/31/21    Hospital:    [place] [address] [address], [address]  Phone: [phone]    · This is an Outpatient procedure.   · Use the provided Chlorhexadine surgical soa your primary care physician of your surgery and ask if him/her will need to see you prior to surgery. · If you develop symptoms of another illness prior to surgery please contact our office immediately.    · If this is an inpatient surgery, attending the S

## 2021-08-23 NOTE — H&P
[facility]        Patient Name:  Tierra Teixeira   YOB: 1940   Gender:  Male   Appt Date:  8/23/2021   Provider:  Otoniel Shoemaker MD     PATIENT PROVIDERS  Referring Provider: Ivan Lundborg, MD    Primary Care [facility] increase in size of enlarged lymph node near the inferior vena cava with stability of all other lymphadenopathy. 7/6/2021: EUS with FNB by Dr. Юлия Nichols. Pathology with atypical lymphoid infiltrate, worrisome for involvement by lymphoma.    7/22/2021: PET/ state    • Arthritis    • Asthma     in good control   • Belching     Occassionally   • Calculus of kidney    • Cataract    • Constipation    • Disorder of prostate Cancer - removed 2008   • Disorder of thyroid    • Diverticulosis of large intestine    • D Types: Cigarettes        Start date: 1953        Quit date: 1967        Years since quittin.7      Smokeless tobacco: Never Used      Tobacco comment: quit over 50 years ago    Vaping Use      Vaping Use: Never used    Alcohol use: Not Tony Eunice pathologically enlarged.      CT c/a/p 6/11/2021:      There is now a considerably enlarged lymph node in the retroperitoneum anterior to the inferior vena cava measuring up to 5.5 cm, showing considerable increase in volume since the prior CT scan from Oct

## 2021-08-24 DIAGNOSIS — R59.0 INGUINAL LYMPHADENOPATHY: Primary | ICD-10-CM

## 2021-08-26 RX ORDER — ACETAMINOPHEN 500 MG
1000 TABLET ORAL ONCE
Status: CANCELLED | OUTPATIENT
Start: 2021-08-26 | End: 2021-08-26

## 2021-08-28 ENCOUNTER — LAB ENCOUNTER (OUTPATIENT)
Dept: LAB | Age: 81
End: 2021-08-28
Attending: SURGERY
Payer: MEDICARE

## 2021-08-28 DIAGNOSIS — R59.0 INGUINAL LYMPHADENOPATHY: ICD-10-CM

## 2021-08-29 LAB — SARS-COV-2 RNA RESP QL NAA+PROBE: NOT DETECTED

## 2021-08-31 ENCOUNTER — ANESTHESIA EVENT (OUTPATIENT)
Dept: SURGERY | Facility: HOSPITAL | Age: 81
End: 2021-08-31
Payer: MEDICARE

## 2021-08-31 ENCOUNTER — ANESTHESIA (OUTPATIENT)
Dept: SURGERY | Facility: HOSPITAL | Age: 81
End: 2021-08-31
Payer: MEDICARE

## 2021-08-31 ENCOUNTER — HOSPITAL ENCOUNTER (OUTPATIENT)
Facility: HOSPITAL | Age: 81
Setting detail: HOSPITAL OUTPATIENT SURGERY
Discharge: HOME | End: 2021-08-31
Attending: SURGERY | Admitting: SURGERY
Payer: MEDICARE

## 2021-08-31 VITALS
TEMPERATURE: 98 F | RESPIRATION RATE: 16 BRPM | HEART RATE: 93 BPM | HEIGHT: 65 IN | DIASTOLIC BLOOD PRESSURE: 74 MMHG | OXYGEN SATURATION: 99 % | SYSTOLIC BLOOD PRESSURE: 139 MMHG | BODY MASS INDEX: 21.31 KG/M2 | WEIGHT: 127.88 LBS

## 2021-08-31 DIAGNOSIS — R59.0 INGUINAL LYMPHADENOPATHY: Primary | ICD-10-CM

## 2021-08-31 LAB
CD10 CELLS NFR SPEC: <1 %
CD19 CELLS NFR SPEC: 12 %
CD19/CD10 CELLS: <1 %
CD20 CELLS NFR SPEC: 13 %
CD23 CELLS NFR SPEC: 1 %
CD3 CELLS NFR SPEC: 74 %
CD3+CD4+ CELLS NFR SPEC: 43 %
CD3+CD4+ CELLS/CD3+CD8+ CLL SPEC: 1.5
CD3+CD8+ CELLS NFR SPEC: 28 %
CD45 CELLS NFR SPEC: 100 %
CD5 CELLS NFR SPEC: 76 %
CD5/CD19 CELLS: <1 %
CD56 CELLS NFR SPEC: 6 %
CD7 CELLS NFR SPEC: 73 %
CELL SURF KAPPA/LAMBDA RATIO: 1.2
CELL SURF LAMBDA LIGHT CHAIN: 5 %
CELL SURFACE KAPPA LIGHT CHAIN: 6 %
FMC7 CELLS NFR SPEC: 6 %

## 2021-08-31 PROCEDURE — 88342 IMHCHEM/IMCYTCHM 1ST ANTB: CPT | Performed by: SURGERY

## 2021-08-31 PROCEDURE — 88185 FLOWCYTOMETRY/TC ADD-ON: CPT | Performed by: SURGERY

## 2021-08-31 PROCEDURE — 88184 FLOWCYTOMETRY/ TC 1 MARKER: CPT | Performed by: SURGERY

## 2021-08-31 PROCEDURE — 87102 FUNGUS ISOLATION CULTURE: CPT | Performed by: SURGERY

## 2021-08-31 PROCEDURE — 07BJ0ZX EXCISION OF LEFT INGUINAL LYMPHATIC, OPEN APPROACH, DIAGNOSTIC: ICD-10-PCS | Performed by: SURGERY

## 2021-08-31 PROCEDURE — 87206 SMEAR FLUORESCENT/ACID STAI: CPT | Performed by: SURGERY

## 2021-08-31 PROCEDURE — 88307 TISSUE EXAM BY PATHOLOGIST: CPT | Performed by: SURGERY

## 2021-08-31 PROCEDURE — 87116 MYCOBACTERIA CULTURE: CPT | Performed by: SURGERY

## 2021-08-31 PROCEDURE — 88321 CONSLTJ&REPRT SLD PREP ELSWR: CPT | Performed by: SURGERY

## 2021-08-31 PROCEDURE — 88341 IMHCHEM/IMCYTCHM EA ADD ANTB: CPT | Performed by: SURGERY

## 2021-08-31 PROCEDURE — 88333 PATH CONSLTJ SURG CYTO XM 1: CPT | Performed by: SURGERY

## 2021-08-31 PROCEDURE — 88365 INSITU HYBRIDIZATION (FISH): CPT | Performed by: SURGERY

## 2021-08-31 RX ORDER — ACETAMINOPHEN 500 MG
1000 TABLET ORAL ONCE AS NEEDED
Status: DISCONTINUED | OUTPATIENT
Start: 2021-08-31 | End: 2021-08-31

## 2021-08-31 RX ORDER — MIDAZOLAM HYDROCHLORIDE 1 MG/ML
1 INJECTION INTRAMUSCULAR; INTRAVENOUS EVERY 5 MIN PRN
Status: DISCONTINUED | OUTPATIENT
Start: 2021-08-31 | End: 2021-08-31

## 2021-08-31 RX ORDER — HYDROCODONE BITARTRATE AND ACETAMINOPHEN 5; 325 MG/1; MG/1
1 TABLET ORAL AS NEEDED
Status: DISCONTINUED | OUTPATIENT
Start: 2021-08-31 | End: 2021-08-31

## 2021-08-31 RX ORDER — MEPERIDINE HYDROCHLORIDE 25 MG/ML
12.5 INJECTION INTRAMUSCULAR; INTRAVENOUS; SUBCUTANEOUS AS NEEDED
Status: DISCONTINUED | OUTPATIENT
Start: 2021-08-31 | End: 2021-08-31

## 2021-08-31 RX ORDER — DEXAMETHASONE SODIUM PHOSPHATE 4 MG/ML
VIAL (ML) INJECTION AS NEEDED
Status: DISCONTINUED | OUTPATIENT
Start: 2021-08-31 | End: 2021-08-31 | Stop reason: SURG

## 2021-08-31 RX ORDER — SODIUM CHLORIDE, SODIUM LACTATE, POTASSIUM CHLORIDE, CALCIUM CHLORIDE 600; 310; 30; 20 MG/100ML; MG/100ML; MG/100ML; MG/100ML
INJECTION, SOLUTION INTRAVENOUS CONTINUOUS
Status: DISCONTINUED | OUTPATIENT
Start: 2021-08-31 | End: 2021-08-31

## 2021-08-31 RX ORDER — ONDANSETRON 2 MG/ML
INJECTION INTRAMUSCULAR; INTRAVENOUS AS NEEDED
Status: DISCONTINUED | OUTPATIENT
Start: 2021-08-31 | End: 2021-08-31 | Stop reason: SURG

## 2021-08-31 RX ORDER — NALOXONE HYDROCHLORIDE 0.4 MG/ML
80 INJECTION, SOLUTION INTRAMUSCULAR; INTRAVENOUS; SUBCUTANEOUS AS NEEDED
Status: DISCONTINUED | OUTPATIENT
Start: 2021-08-31 | End: 2021-08-31

## 2021-08-31 RX ORDER — CLINDAMYCIN PHOSPHATE 900 MG/50ML
900 INJECTION INTRAVENOUS ONCE
Status: DISCONTINUED | OUTPATIENT
Start: 2021-08-31 | End: 2021-08-31 | Stop reason: HOSPADM

## 2021-08-31 RX ORDER — HYDROCODONE BITARTRATE AND ACETAMINOPHEN 5; 325 MG/1; MG/1
2 TABLET ORAL AS NEEDED
Status: DISCONTINUED | OUTPATIENT
Start: 2021-08-31 | End: 2021-08-31

## 2021-08-31 RX ORDER — CEFAZOLIN SODIUM 1 G/3ML
INJECTION, POWDER, FOR SOLUTION INTRAMUSCULAR; INTRAVENOUS AS NEEDED
Status: DISCONTINUED | OUTPATIENT
Start: 2021-08-31 | End: 2021-08-31 | Stop reason: SURG

## 2021-08-31 RX ORDER — EPHEDRINE SULFATE 50 MG/ML
INJECTION INTRAVENOUS AS NEEDED
Status: DISCONTINUED | OUTPATIENT
Start: 2021-08-31 | End: 2021-08-31 | Stop reason: SURG

## 2021-08-31 RX ORDER — ONDANSETRON 2 MG/ML
4 INJECTION INTRAMUSCULAR; INTRAVENOUS AS NEEDED
Status: DISCONTINUED | OUTPATIENT
Start: 2021-08-31 | End: 2021-08-31

## 2021-08-31 RX ORDER — BUPIVACAINE HYDROCHLORIDE 2.5 MG/ML
INJECTION, SOLUTION EPIDURAL; INFILTRATION; INTRACAUDAL AS NEEDED
Status: DISCONTINUED | OUTPATIENT
Start: 2021-08-31 | End: 2021-08-31 | Stop reason: HOSPADM

## 2021-08-31 RX ORDER — HYDROMORPHONE HYDROCHLORIDE 1 MG/ML
0.4 INJECTION, SOLUTION INTRAMUSCULAR; INTRAVENOUS; SUBCUTANEOUS EVERY 5 MIN PRN
Status: DISCONTINUED | OUTPATIENT
Start: 2021-08-31 | End: 2021-08-31

## 2021-08-31 RX ORDER — TRAMADOL HYDROCHLORIDE 50 MG/1
50 TABLET ORAL EVERY 6 HOURS PRN
Qty: 8 TABLET | Refills: 0 | Status: SHIPPED | OUTPATIENT
Start: 2021-08-31 | End: 2021-10-28

## 2021-08-31 RX ORDER — DIPHENHYDRAMINE HYDROCHLORIDE 50 MG/ML
12.5 INJECTION INTRAMUSCULAR; INTRAVENOUS AS NEEDED
Status: DISCONTINUED | OUTPATIENT
Start: 2021-08-31 | End: 2021-08-31

## 2021-08-31 RX ORDER — METOCLOPRAMIDE HYDROCHLORIDE 5 MG/ML
INJECTION INTRAMUSCULAR; INTRAVENOUS AS NEEDED
Status: DISCONTINUED | OUTPATIENT
Start: 2021-08-31 | End: 2021-08-31 | Stop reason: SURG

## 2021-08-31 RX ORDER — LIDOCAINE HYDROCHLORIDE 10 MG/ML
INJECTION, SOLUTION EPIDURAL; INFILTRATION; INTRACAUDAL; PERINEURAL AS NEEDED
Status: DISCONTINUED | OUTPATIENT
Start: 2021-08-31 | End: 2021-08-31 | Stop reason: SURG

## 2021-08-31 RX ORDER — LABETALOL HYDROCHLORIDE 5 MG/ML
5 INJECTION, SOLUTION INTRAVENOUS EVERY 5 MIN PRN
Status: DISCONTINUED | OUTPATIENT
Start: 2021-08-31 | End: 2021-08-31

## 2021-08-31 RX ORDER — DEXAMETHASONE SODIUM PHOSPHATE 4 MG/ML
4 VIAL (ML) INJECTION AS NEEDED
Status: DISCONTINUED | OUTPATIENT
Start: 2021-08-31 | End: 2021-08-31

## 2021-08-31 RX ADMIN — EPHEDRINE SULFATE 10 MG: 50 INJECTION INTRAVENOUS at 13:25:00

## 2021-08-31 RX ADMIN — SODIUM CHLORIDE, SODIUM LACTATE, POTASSIUM CHLORIDE, CALCIUM CHLORIDE: 600; 310; 30; 20 INJECTION, SOLUTION INTRAVENOUS at 13:01:00

## 2021-08-31 RX ADMIN — METOCLOPRAMIDE HYDROCHLORIDE 10 MG: 5 INJECTION INTRAMUSCULAR; INTRAVENOUS at 13:08:00

## 2021-08-31 RX ADMIN — CEFAZOLIN SODIUM 2 G: 1 INJECTION, POWDER, FOR SOLUTION INTRAMUSCULAR; INTRAVENOUS at 13:09:00

## 2021-08-31 RX ADMIN — EPHEDRINE SULFATE 5 MG: 50 INJECTION INTRAVENOUS at 13:16:00

## 2021-08-31 RX ADMIN — DEXAMETHASONE SODIUM PHOSPHATE 8 MG: 4 MG/ML VIAL (ML) INJECTION at 13:08:00

## 2021-08-31 RX ADMIN — LIDOCAINE HYDROCHLORIDE 50 MG: 10 INJECTION, SOLUTION EPIDURAL; INFILTRATION; INTRACAUDAL; PERINEURAL at 13:05:00

## 2021-08-31 RX ADMIN — ONDANSETRON 4 MG: 2 INJECTION INTRAMUSCULAR; INTRAVENOUS at 14:01:00

## 2021-08-31 NOTE — OPERATIVE REPORT
[address]    PATIENT'S NAME: Pat Harry   ATTENDING PHYSICIAN: Conner Berrios. Venecia Morfin MD   OPERATING PHYSICIAN: Conner Berrios.  Venecia Morfin MD   PATIENT ACCOUNT#:   [id number]    LOCATION:  [address] [place] [address] [address]  MEDICAL RECORD #:   [id number] Recovery. Counts were correct. I was present throughout the procedure. Dictated By Dakota Silveira MD  d: 08/31/2021 14:58:01  t: 08/31/2021 17:14:43  [facility] [id number]  GIS/

## 2021-08-31 NOTE — ANESTHESIA PREPROCEDURE EVALUATION
PRE-OP EVALUATION    Patient Name: Nara Hearn    Admit Diagnosis: Inguinal lymphadenopathy [R59.0]    Pre-op Diagnosis: Inguinal lymphadenopathy [R59.0]    Left inguinal lymph node biopsy    Anesthesia Procedure: Left inguinal lymph node biopsy [facility] 5 mg by mouth daily. , Disp: , Rfl: , 8/31/2021 at 0400        Allergies: Penicillins and Ragweed      Anesthesia Evaluation    Patient summary reviewed.     Anesthetic Complications  (-) history of anesthetic complications         GI/Hepatic/Renal  Comment: BUN 18 08/02/2021    CREATSERUM 0.91 08/02/2021    GLU 96 08/02/2021    CA 9.1 08/02/2021            Airway      Mallampati: III  Mouth opening: >3 FB  TM distance: > 6 cm  Neck ROM: full Cardiovascular    Cardiovascular exam normal.         Dental    No n

## 2021-08-31 NOTE — ANESTHESIA PROCEDURE NOTES
Airway  Date/Time: 8/31/2021 1:06 PM  Urgency: elective      General Information and Staff    Patient location during procedure: OR  Anesthesiologist: Greg Driver DO  Performed: anesthesiologist     Indications and Patient Condition  Indications for

## 2021-08-31 NOTE — BRIEF OP NOTE
Pre-Operative Diagnosis: Inguinal lymphadenopathy [R59.0]     Post-Operative Diagnosis: Inguinal lymphadenopathy [R59.0]      Procedure Performed:   Left inguinal lymph node biopsy    Surgeon(s) and Role:     Ramo Jade MD - Primary     AMY Sena

## 2021-08-31 NOTE — ANESTHESIA POSTPROCEDURE EVALUATION
[address] Patient Status:  Hospital Outpatient Surgery   Age/Gender 68year old male MRN [id number]   [facility] SURGERY Attending Pj Chinchilla MD   [facility] Day # 0 PCP Sherly Hawk DO       Anesthesia Post-op Note

## 2021-08-31 NOTE — INTERVAL H&P NOTE
Patient seen and examined. No changes to the attached history and physical are noted. 68year old male presenting today for planned left inguinal lymph node excisional biopsy.     Javier Wang PA-C    Department of Surgical Oncology  [facility]

## 2021-09-01 ENCOUNTER — TELEPHONE (OUTPATIENT)
Dept: SURGERY | Facility: CLINIC | Age: 81
End: 2021-09-01

## 2021-09-01 NOTE — TELEPHONE ENCOUNTER
Called patient to check on post operatively. Pt stated he was having some discomfort, but hasn't taken any pain medication. Stated he would take if needed. Pt stated that there was blood on surgical dressing, but it has not changed since yesterday.   I l

## 2021-09-07 RX ORDER — ATORVASTATIN CALCIUM 10 MG/1
10 TABLET, FILM COATED ORAL NIGHTLY
Qty: 90 TABLET | Refills: 3 | OUTPATIENT
Start: 2021-09-07

## 2021-09-08 RX ORDER — ATORVASTATIN CALCIUM 10 MG/1
10 TABLET, FILM COATED ORAL NIGHTLY
Qty: 90 TABLET | Refills: 3 | OUTPATIENT
Start: 2021-09-08

## 2021-09-09 ENCOUNTER — OFFICE VISIT (OUTPATIENT)
Dept: SURGERY | Facility: CLINIC | Age: 81
End: 2021-09-09

## 2021-09-09 VITALS
HEART RATE: 69 BPM | DIASTOLIC BLOOD PRESSURE: 74 MMHG | WEIGHT: 126.63 LBS | BODY MASS INDEX: 21 KG/M2 | TEMPERATURE: 98 F | RESPIRATION RATE: 16 BRPM | OXYGEN SATURATION: 95 % | SYSTOLIC BLOOD PRESSURE: 123 MMHG

## 2021-09-09 DIAGNOSIS — R59.0 INGUINAL LYMPHADENOPATHY: Primary | ICD-10-CM

## 2021-09-09 PROCEDURE — 99024 POSTOP FOLLOW-UP VISIT: CPT | Performed by: PHYSICIAN ASSISTANT

## 2021-09-09 RX ORDER — ATORVASTATIN CALCIUM 10 MG/1
10 TABLET, FILM COATED ORAL NIGHTLY
Qty: 90 TABLET | Refills: 3 | Status: SHIPPED | OUTPATIENT
Start: 2021-09-09

## 2021-09-09 NOTE — PROGRESS NOTES
[facility]        Patient Name:  Kay Carmona   YOB: 1940   Gender:  Male   Appt Date:  9/9/2021   Provider:  KESHA Fernandez     PATIENT PROVIDERS  Primary Care Provider:Salas Melendez DO   Address: [address] [place] [address] stability of all other lymphadenopathy. 7/6/2021: EUS with FNB by Dr. Matt Corcoran. Pathology with atypical lymphoid infiltrate, worrisome for involvement by lymphoma. 7/22/2021: PET/CT with moderate increased uptake.   8/5/2021: CT biopsy of retroperitonea Rfl: •  AmLODIPine Besylate (NORVASC) 5 MG Oral Tab, Take 5 mg by mouth daily. , Disp: , Rfl:      Allergies Reviewed:    Penicillins             RASH  Ragweed                 ITCHING, OTHER (SEE COMMENTS)    Comment:stuffiness     History:  Reviewed:  Pa 2008   • Other surgical history  11/11/2020    VENTRAL INCISIONAL HERNIA REPAIR, PRIMARY, LEFT AXILLARY LYMPH NODE EXCISIONAL BIOPSY   • Removal gallbladder     • Repair ing hernia,5+y/o,reducibl     • Tonsillectomy        Reviewed Social History:  Social diarrhea - should symptoms return he will follow-up with his  PCP  -Surgical pathology still in process, sent to [facility] for review - discussed with family  -Follow-up with Dr. Molina Numbers pending pathology       Electronically Signed by: KESHA Matserson

## 2021-09-15 ENCOUNTER — TELEPHONE (OUTPATIENT)
Dept: SURGERY | Facility: CLINIC | Age: 81
End: 2021-09-15

## 2021-09-16 ENCOUNTER — TELEPHONE (OUTPATIENT)
Dept: HEMATOLOGY/ONCOLOGY | Facility: HOSPITAL | Age: 81
End: 2021-09-16

## 2021-09-16 NOTE — TELEPHONE ENCOUNTER
We received an after hours message. Patient was returning  a call. Please call patient when you have a chance. Patient's phone number is: [phone]. Thank you.

## 2021-09-20 RX ORDER — ESCITALOPRAM OXALATE 5 MG/1
TABLET ORAL
Qty: 30 TABLET | Refills: 0 | Status: SHIPPED | OUTPATIENT
Start: 2021-09-20 | End: 2021-10-19

## 2021-09-20 NOTE — TELEPHONE ENCOUNTER
Last refill: 05/18/21  Qty: 30  W/ 3 refills  Last ov: 01/21/21    Requested Prescriptions     Pending Prescriptions Disp Refills   • ESCITALOPRAM 5 MG Oral Tab [Pharmacy Med Name: Escitalopram Oxalate 5 MG Oral Tablet] 30 tablet 0     Sig: Take 1 tablet b

## 2021-09-21 ENCOUNTER — TELEPHONE (OUTPATIENT)
Dept: HEMATOLOGY/ONCOLOGY | Facility: HOSPITAL | Age: 81
End: 2021-09-21

## 2021-09-21 NOTE — TELEPHONE ENCOUNTER
LVM to pt that unable to see what pt mean about seeing 'brain tumor' result. Pt sees MD in office tomorrow at 1pm can discuss then.

## 2021-09-21 NOTE — TELEPHONE ENCOUNTER
Patient calling and stated he had a message on [address] [facility]Third [facility] regarding results of Brain Tumor. PT stated he never mentioned a Brain Tumor to Dr Tonia Barcenas, and as far as he knows- He does not have one.     Please return call

## 2021-09-22 ENCOUNTER — OFFICE VISIT (OUTPATIENT)
Dept: HEMATOLOGY/ONCOLOGY | Facility: HOSPITAL | Age: 81
End: 2021-09-22
Attending: INTERNAL MEDICINE
Payer: MEDICARE

## 2021-09-22 ENCOUNTER — TELEPHONE (OUTPATIENT)
Dept: DERMATOLOGY | Age: 81
End: 2021-09-22

## 2021-09-22 VITALS
RESPIRATION RATE: 16 BRPM | TEMPERATURE: 98 F | SYSTOLIC BLOOD PRESSURE: 133 MMHG | OXYGEN SATURATION: 96 % | DIASTOLIC BLOOD PRESSURE: 70 MMHG | HEIGHT: 65 IN | WEIGHT: 130.63 LBS | BODY MASS INDEX: 21.76 KG/M2 | HEART RATE: 67 BPM

## 2021-09-22 DIAGNOSIS — C84.48 PERIPHERAL T-CELL LYMPHOMA OF LYMPH NODES OF MULTIPLE REGIONS (HCC): Primary | ICD-10-CM

## 2021-09-22 DIAGNOSIS — Z01.818 EXAMINATION PRIOR TO CHEMOTHERAPY: ICD-10-CM

## 2021-09-22 PROCEDURE — G2212 PROLONG OUTPT/OFFICE VIS: HCPCS | Performed by: INTERNAL MEDICINE

## 2021-09-22 PROCEDURE — 99215 OFFICE O/P EST HI 40 MIN: CPT | Performed by: INTERNAL MEDICINE

## 2021-09-22 RX ORDER — PROCHLORPERAZINE MALEATE 10 MG
10 TABLET ORAL EVERY 6 HOURS PRN
Qty: 30 TABLET | Refills: 3 | Status: SHIPPED | OUTPATIENT
Start: 2021-09-22

## 2021-09-22 RX ORDER — ONDANSETRON HYDROCHLORIDE 8 MG/1
8 TABLET, FILM COATED ORAL EVERY 8 HOURS PRN
Qty: 30 TABLET | Refills: 3 | Status: SHIPPED | OUTPATIENT
Start: 2021-09-22

## 2021-09-22 RX ORDER — PREDNISONE 50 MG/1
100 TABLET ORAL DAILY
Qty: 60 TABLET | Refills: 0 | Status: SHIPPED | OUTPATIENT
Start: 2021-09-22 | End: 2022-01-10

## 2021-09-22 NOTE — PROGRESS NOTES
Outpatient Oncology Care Plan  Problem list:  knowledge deficit    Problems related to:    chemotherapy  disease/disease progression  side effect of treatment    Interventions:  provided general teaching    Expected outcomes:  understands plan of care    P

## 2021-09-22 NOTE — PROGRESS NOTES
Hematology/Oncology Clinic Follow Up Visit    Patient Name: Titus Landeros  Medical Record Number: [id number]    YOB: 1940    PCP: Dr. Monroe Sharma  Other providers: Dr. Kateryna Cornelius, Dr. Kat Khanna, Dr. Aime Israel    Reason for Co Clinic. The biopsy shows an infiltrate of T cells which stain as CD3 positive T cells and coexpress CD2, CD5 and variable CD30. CD4 and CD8 do not appear to be coexpressed and CD7 is not expressed.   Slightly increased numbers of background CD20 and PAX 5 intermixed with numerous histiocytes and scattered intermediate sized immunoblastic cells.  There are few intermixed eosinophils.   Immunoperoxidase stains were performed at [facility] in [place], [place] (block A1: CD3, CD4, CD7, CD8, CD20, CD21, CD30,  irregular nuclei, vesicular chromatin, and abundant pale cytoplasm in a polymorphous background of small lymphocytes, eosinophils, small vessels, and scattered immunoblasts.   The neoplastic cells liborio as CD3-positive T-cells that co-express CD2, CD5, CD27 Total)   • Anxiety state    • Asthma     in good control   • Calculus of kidney    • Cataract    • Disorder of thyroid    • Diverticulosis of large intestine    • Essential hypertension    • Hearing impairment    • Hemorrhoids     No recent issues   • Hist Disp: , Rfl:   Fluticasone Propionate 50 MCG/ACT Nasal Suspension, 2 sprays by Each Nare route daily. , Disp: , Rfl:   Azelastine HCl 0.05 % Ophthalmic Solution, Place 1 drop into both eyes as needed.   , Disp: , Rfl:   atorvastatin 10 MG Oral Tab, Take 10 and oriented, not in acute distress  Psych: Mood and affect are appropriate  Eyes: EOMI  ENT: Oropharynx is clear  CV: Regular rate and rhythm, no murmurs, no LE edema  Respiratory: Lungs clear to auscultation bilaterally  GI/Abd: Soft, non-tender with nor curative intent with cytotoxic chemotherapy. Currently he he does not have a known symptomatic disease though has multifocal disease. We discussed my recommendation for treatment with curative intent chemotherapy with BV-CHP, likely for 6 cycles.   We zara

## 2021-09-23 ENCOUNTER — OFFICE VISIT (OUTPATIENT)
Dept: HEMATOLOGY/ONCOLOGY | Facility: HOSPITAL | Age: 81
End: 2021-09-23
Attending: INTERNAL MEDICINE
Payer: MEDICARE

## 2021-09-23 DIAGNOSIS — C84.48 PERIPHERAL T-CELL LYMPHOMA OF LYMPH NODES OF MULTIPLE REGIONS (HCC): Primary | ICD-10-CM

## 2021-09-23 DIAGNOSIS — Z71.89 OTHER SPECIFIED COUNSELING: ICD-10-CM

## 2021-09-23 PROCEDURE — 99215 OFFICE O/P EST HI 40 MIN: CPT | Performed by: CLINICAL NURSE SPECIALIST

## 2021-09-23 PROCEDURE — G2212 PROLONG OUTPT/OFFICE VIS: HCPCS | Performed by: CLINICAL NURSE SPECIALIST

## 2021-09-23 NOTE — PROGRESS NOTES
IV Chemotherapy Education    Patient:Lenin RAMIREZ McSonia     Date: 9/23/2021   Diagnosis:  T-cell lymphoma    Caregivers present: Wife, daughter    Drug names:  Brentuximab Vedotin, Cyclophosphamide, Doxorubicin, Prednisone    Myelosuppression  Decrease in was spent counseling patient regarding the above documented side effects and management, when to call provider and contact information.    Encounter Times  PreChartin minutes    Reviewing/Obtaining:   minutes      Medical Exam:   minutes    Plan:   khadar

## 2021-09-25 ENCOUNTER — HOSPITAL ENCOUNTER (OUTPATIENT)
Dept: CV DIAGNOSTICS | Facility: HOSPITAL | Age: 81
Discharge: HOME | End: 2021-09-25
Attending: INTERNAL MEDICINE
Payer: MEDICARE

## 2021-09-25 DIAGNOSIS — Z01.818 EXAMINATION PRIOR TO CHEMOTHERAPY: ICD-10-CM

## 2021-09-25 DIAGNOSIS — C84.48 PERIPHERAL T-CELL LYMPHOMA OF LYMPH NODES OF MULTIPLE REGIONS (HCC): ICD-10-CM

## 2021-09-25 PROCEDURE — 93306 TTE W/DOPPLER COMPLETE: CPT | Performed by: INTERNAL MEDICINE

## 2021-09-27 ENCOUNTER — OFFICE VISIT (OUTPATIENT)
Dept: DERMATOLOGY | Age: 81
End: 2021-09-27

## 2021-09-27 DIAGNOSIS — L57.0 ACTINIC KERATOSES: Primary | ICD-10-CM

## 2021-09-27 PROCEDURE — 17004 DESTROY PREMAL LESIONS 15/>: CPT | Performed by: DERMATOLOGY

## 2021-09-27 PROCEDURE — 99213 OFFICE O/P EST LOW 20 MIN: CPT | Performed by: DERMATOLOGY

## 2021-09-28 ENCOUNTER — LAB ENCOUNTER (OUTPATIENT)
Dept: LAB | Age: 81
End: 2021-09-28
Attending: INTERNAL MEDICINE
Payer: MEDICARE

## 2021-09-28 DIAGNOSIS — C84.48 PERIPHERAL T-CELL LYMPHOMA OF LYMPH NODES OF MULTIPLE REGIONS (HCC): ICD-10-CM

## 2021-09-28 DIAGNOSIS — Z01.818 EXAMINATION PRIOR TO CHEMOTHERAPY: ICD-10-CM

## 2021-09-30 ENCOUNTER — HOSPITAL ENCOUNTER (OUTPATIENT)
Dept: NUCLEAR MEDICINE | Facility: HOSPITAL | Age: 81
Discharge: HOME | End: 2021-09-30
Attending: INTERNAL MEDICINE
Payer: MEDICARE

## 2021-09-30 DIAGNOSIS — C84.48 PERIPHERAL T-CELL LYMPHOMA OF LYMPH NODES OF MULTIPLE REGIONS (HCC): ICD-10-CM

## 2021-09-30 PROCEDURE — 78815 PET IMAGE W/CT SKULL-THIGH: CPT | Performed by: INTERNAL MEDICINE

## 2021-09-30 PROCEDURE — 82962 GLUCOSE BLOOD TEST: CPT

## 2021-10-01 ENCOUNTER — HOSPITAL ENCOUNTER (OUTPATIENT)
Dept: CT IMAGING | Facility: HOSPITAL | Age: 81
Discharge: HOME | End: 2021-10-01
Attending: INTERNAL MEDICINE
Payer: MEDICARE

## 2021-10-01 ENCOUNTER — NURSE ONLY (OUTPATIENT)
Dept: LAB | Facility: HOSPITAL | Age: 81
End: 2021-10-01
Attending: INTERNAL MEDICINE
Payer: MEDICARE

## 2021-10-01 VITALS
RESPIRATION RATE: 18 BRPM | OXYGEN SATURATION: 98 % | HEIGHT: 65 IN | SYSTOLIC BLOOD PRESSURE: 151 MMHG | WEIGHT: 126 LBS | BODY MASS INDEX: 20.99 KG/M2 | DIASTOLIC BLOOD PRESSURE: 76 MMHG | HEART RATE: 91 BPM | TEMPERATURE: 99 F

## 2021-10-01 DIAGNOSIS — C85.90 LYMPHOMA (HCC): ICD-10-CM

## 2021-10-01 DIAGNOSIS — C84.48 PERIPHERAL T CELL LYMPHOMA OF LYMPH NODES OF MULTIPLE SITES (HCC): ICD-10-CM

## 2021-10-01 DIAGNOSIS — C84.48 PERIPHERAL T-CELL LYMPHOMA OF LYMPH NODES OF MULTIPLE REGIONS (HCC): ICD-10-CM

## 2021-10-01 DIAGNOSIS — C84.48 PERIPHERAL T CELL LYMPHOMA OF LYMPH NODES OF MULTIPLE SITES (HCC): Primary | ICD-10-CM

## 2021-10-01 LAB
INR BLD: 1 (ref 0.89–1.11)
PROTHROMBIN TIME: 13.4 SECONDS (ref 12.2–14.5)

## 2021-10-01 PROCEDURE — 88184 FLOWCYTOMETRY/ TC 1 MARKER: CPT | Performed by: INTERNAL MEDICINE

## 2021-10-01 PROCEDURE — 77012 CT SCAN FOR NEEDLE BIOPSY: CPT | Performed by: INTERNAL MEDICINE

## 2021-10-01 PROCEDURE — 88305 TISSUE EXAM BY PATHOLOGIST: CPT | Performed by: INTERNAL MEDICINE

## 2021-10-01 PROCEDURE — 88342 IMHCHEM/IMCYTCHM 1ST ANTB: CPT | Performed by: INTERNAL MEDICINE

## 2021-10-01 PROCEDURE — 88291 CYTO/MOLECULAR REPORT: CPT | Performed by: INTERNAL MEDICINE

## 2021-10-01 PROCEDURE — 88264 CHROMOSOME ANALYSIS 20-25: CPT | Performed by: INTERNAL MEDICINE

## 2021-10-01 PROCEDURE — 88185 FLOWCYTOMETRY/TC ADD-ON: CPT | Performed by: INTERNAL MEDICINE

## 2021-10-01 PROCEDURE — 85097 BONE MARROW INTERPRETATION: CPT | Performed by: INTERNAL MEDICINE

## 2021-10-01 PROCEDURE — 88313 SPECIAL STAINS GROUP 2: CPT | Performed by: INTERNAL MEDICINE

## 2021-10-01 PROCEDURE — 88237 TISSUE CULTURE BONE MARROW: CPT | Performed by: INTERNAL MEDICINE

## 2021-10-01 PROCEDURE — 36415 COLL VENOUS BLD VENIPUNCTURE: CPT

## 2021-10-01 PROCEDURE — 38222 DX BONE MARROW BX & ASPIR: CPT | Performed by: INTERNAL MEDICINE

## 2021-10-01 PROCEDURE — 81342 TRG GENE REARRANGEMENT ANAL: CPT

## 2021-10-01 PROCEDURE — 99152 MOD SED SAME PHYS/QHP 5/>YRS: CPT | Performed by: INTERNAL MEDICINE

## 2021-10-01 PROCEDURE — 88341 IMHCHEM/IMCYTCHM EA ADD ANTB: CPT | Performed by: INTERNAL MEDICINE

## 2021-10-01 PROCEDURE — 85610 PROTHROMBIN TIME: CPT

## 2021-10-01 RX ORDER — HYDROCODONE BITARTRATE AND ACETAMINOPHEN 5; 325 MG/1; MG/1
1 TABLET ORAL EVERY 4 HOURS PRN
Status: DISCONTINUED | OUTPATIENT
Start: 2021-10-01 | End: 2021-10-03

## 2021-10-01 RX ORDER — MIDAZOLAM HYDROCHLORIDE 1 MG/ML
INJECTION INTRAMUSCULAR; INTRAVENOUS
Status: DISCONTINUED
Start: 2021-10-01 | End: 2021-10-01 | Stop reason: WASHOUT

## 2021-10-01 RX ORDER — SODIUM CHLORIDE 9 MG/ML
INJECTION, SOLUTION INTRAVENOUS CONTINUOUS
Status: DISCONTINUED | OUTPATIENT
Start: 2021-10-01 | End: 2021-10-03

## 2021-10-01 RX ORDER — ACETAMINOPHEN 325 MG/1
650 TABLET ORAL EVERY 4 HOURS PRN
Status: DISCONTINUED | OUTPATIENT
Start: 2021-10-01 | End: 2021-10-03

## 2021-10-01 RX ORDER — NALOXONE HYDROCHLORIDE 0.4 MG/ML
80 INJECTION, SOLUTION INTRAMUSCULAR; INTRAVENOUS; SUBCUTANEOUS AS NEEDED
Status: DISCONTINUED | OUTPATIENT
Start: 2021-10-01 | End: 2021-10-03

## 2021-10-01 RX ORDER — FLUMAZENIL 0.1 MG/ML
0.2 INJECTION, SOLUTION INTRAVENOUS AS NEEDED
Status: DISCONTINUED | OUTPATIENT
Start: 2021-10-01 | End: 2021-10-03

## 2021-10-01 RX ORDER — HYDROCODONE BITARTRATE AND ACETAMINOPHEN 5; 325 MG/1; MG/1
2 TABLET ORAL EVERY 4 HOURS PRN
Status: DISCONTINUED | OUTPATIENT
Start: 2021-10-01 | End: 2021-10-03

## 2021-10-01 RX ORDER — MIDAZOLAM HYDROCHLORIDE 1 MG/ML
1 INJECTION INTRAMUSCULAR; INTRAVENOUS EVERY 5 MIN PRN
Status: ACTIVE | OUTPATIENT
Start: 2021-10-01 | End: 2021-10-01

## 2021-10-01 RX ADMIN — SODIUM CHLORIDE: 9 INJECTION, SOLUTION INTRAVENOUS at 11:16:00

## 2021-10-01 NOTE — PROCEDURES
[facility]  Procedure Note    Juan Kiser Patient Status:  Outpatient    1940 MRN [id number]   [facility] CT Attending Fernando Saucedo MD   Hosp Day # 0 PCP Kristen Jordan DO     Procedure: CT guided bone marrow biopsy

## 2021-10-01 NOTE — PRE-SEDATION ASSESSMENT
[facility]  IR Pre-Procedure Sedation Assessment   H&P in Dr Rosario Console progress note dated 9/22/1 reviewed, no changes    History of snoring or sleep or apnea?    Yes    History of previous problems with anesthesia or sedation  No    Physical Findings:  N

## 2021-10-04 ENCOUNTER — HOSPITAL ENCOUNTER (OUTPATIENT)
Dept: INTERVENTIONAL RADIOLOGY/VASCULAR | Facility: HOSPITAL | Age: 81
Discharge: HOME | End: 2021-10-04
Attending: INTERNAL MEDICINE | Admitting: INTERNAL MEDICINE
Payer: MEDICARE

## 2021-10-04 VITALS
HEART RATE: 70 BPM | SYSTOLIC BLOOD PRESSURE: 121 MMHG | TEMPERATURE: 99 F | DIASTOLIC BLOOD PRESSURE: 64 MMHG | RESPIRATION RATE: 25 BRPM | OXYGEN SATURATION: 97 %

## 2021-10-04 DIAGNOSIS — C84.48 PERIPHERAL T-CELL LYMPHOMA OF LYMPH NODES OF MULTIPLE REGIONS (HCC): ICD-10-CM

## 2021-10-04 DIAGNOSIS — Z01.818 EXAMINATION PRIOR TO CHEMOTHERAPY: ICD-10-CM

## 2021-10-04 DIAGNOSIS — C85.90 LYMPHOMA (HCC): Primary | ICD-10-CM

## 2021-10-04 PROCEDURE — 76937 US GUIDE VASCULAR ACCESS: CPT

## 2021-10-04 PROCEDURE — 99152 MOD SED SAME PHYS/QHP 5/>YRS: CPT

## 2021-10-04 PROCEDURE — 02HV33Z INSERTION OF INFUSION DEVICE INTO SUPERIOR VENA CAVA, PERCUTANEOUS APPROACH: ICD-10-PCS | Performed by: RADIOLOGY

## 2021-10-04 PROCEDURE — 36561 INSERT TUNNELED CV CATH: CPT

## 2021-10-04 PROCEDURE — 0JH60WZ INSERTION OF TOTALLY IMPLANTABLE VASCULAR ACCESS DEVICE INTO CHEST SUBCUTANEOUS TISSUE AND FASCIA, OPEN APPROACH: ICD-10-PCS | Performed by: RADIOLOGY

## 2021-10-04 PROCEDURE — 85610 PROTHROMBIN TIME: CPT

## 2021-10-04 PROCEDURE — S0077 INJECTION, CLINDAMYCIN PHOSP: HCPCS

## 2021-10-04 PROCEDURE — B518ZZA FLUOROSCOPY OF SUPERIOR VENA CAVA, GUIDANCE: ICD-10-PCS | Performed by: RADIOLOGY

## 2021-10-04 PROCEDURE — 77001 FLUOROGUIDE FOR VEIN DEVICE: CPT

## 2021-10-04 RX ORDER — MIDAZOLAM HYDROCHLORIDE 1 MG/ML
INJECTION INTRAMUSCULAR; INTRAVENOUS
Status: COMPLETED
Start: 2021-10-04 | End: 2021-10-04

## 2021-10-04 RX ORDER — CLINDAMYCIN PHOSPHATE 150 MG/ML
INJECTION, SOLUTION INTRAVENOUS
Status: COMPLETED
Start: 2021-10-04 | End: 2021-10-04

## 2021-10-04 RX ORDER — LIDOCAINE HYDROCHLORIDE 10 MG/ML
INJECTION, SOLUTION INFILTRATION; PERINEURAL
Status: COMPLETED
Start: 2021-10-04 | End: 2021-10-04

## 2021-10-04 RX ORDER — SODIUM CHLORIDE 9 MG/ML
INJECTION, SOLUTION INTRAVENOUS CONTINUOUS
Status: DISCONTINUED | OUTPATIENT
Start: 2021-10-04 | End: 2021-10-04

## 2021-10-04 RX ORDER — LIDOCAINE HYDROCHLORIDE AND EPINEPHRINE 10; 10 MG/ML; UG/ML
INJECTION, SOLUTION INFILTRATION; PERINEURAL
Status: COMPLETED
Start: 2021-10-04 | End: 2021-10-04

## 2021-10-04 RX ORDER — HEPARIN SODIUM 5000 [USP'U]/ML
INJECTION, SOLUTION INTRAVENOUS; SUBCUTANEOUS
Status: COMPLETED
Start: 2021-10-04 | End: 2021-10-04

## 2021-10-04 RX ORDER — CEFAZOLIN SODIUM 1 G/3ML
INJECTION, POWDER, FOR SOLUTION INTRAMUSCULAR; INTRAVENOUS
Status: COMPLETED
Start: 2021-10-04 | End: 2021-10-04

## 2021-10-04 NOTE — H&P
[facility] Patient Status:  Outpatient in a Bed    1940 MRN [id number]   Location [address] [facility] Attending Pema Pizarro MD   Hosp Day # 0 PCP Mandeep Guadalupe DO     Admitting Currently       Family History:  Family History   Problem Relation Age of Onset   • Cancer Mother 48        Brain   • Colon Cancer Father 68   • Cancer Brother 54        Mesothelioma       Allergies/Medications:    Allergies:    Penicillins             RASH

## 2021-10-04 NOTE — PROGRESS NOTES
Patient received back from IR at 0900. Right neck and chest sites with dressing in place, CDI. No complaints of pain. Patient tolerating PO intake.  After required bedrest and recovery, patient able to be up in his room and to the bathroom without difficult

## 2021-10-04 NOTE — PROCEDURES
[facility]  Procedure Note    Ramon Modi Patient Status:  Outpatient in a Bed    1940 MRN [id number]   [facility] [address] [facility] Attending Varun Jimenez MD   Hosp Day # 0 PCP Aminah Chand DO     Procedure: [facility]

## 2021-10-06 ENCOUNTER — OFFICE VISIT (OUTPATIENT)
Dept: HEMATOLOGY/ONCOLOGY | Facility: HOSPITAL | Age: 81
End: 2021-10-06
Attending: INTERNAL MEDICINE
Payer: MEDICARE

## 2021-10-06 ENCOUNTER — SOCIAL WORK SERVICES (OUTPATIENT)
Dept: HEMATOLOGY/ONCOLOGY | Facility: HOSPITAL | Age: 81
End: 2021-10-06

## 2021-10-06 VITALS
BODY MASS INDEX: 22.25 KG/M2 | TEMPERATURE: 97 F | DIASTOLIC BLOOD PRESSURE: 69 MMHG | WEIGHT: 124 LBS | HEIGHT: 62.6 IN | RESPIRATION RATE: 16 BRPM | SYSTOLIC BLOOD PRESSURE: 146 MMHG | OXYGEN SATURATION: 97 % | HEART RATE: 72 BPM

## 2021-10-06 DIAGNOSIS — C84.48 PERIPHERAL T-CELL LYMPHOMA OF LYMPH NODES OF MULTIPLE REGIONS (HCC): Primary | ICD-10-CM

## 2021-10-06 DIAGNOSIS — T80.90XA INFUSION REACTION, INITIAL ENCOUNTER: ICD-10-CM

## 2021-10-06 DIAGNOSIS — T78.40XA ALLERGIC REACTION TO DRUG, INITIAL ENCOUNTER: Primary | ICD-10-CM

## 2021-10-06 PROCEDURE — 96375 TX/PRO/DX INJ NEW DRUG ADDON: CPT

## 2021-10-06 PROCEDURE — 85025 COMPLETE CBC W/AUTO DIFF WBC: CPT

## 2021-10-06 PROCEDURE — 96417 CHEMO IV INFUS EACH ADDL SEQ: CPT

## 2021-10-06 PROCEDURE — 83615 LACTATE (LD) (LDH) ENZYME: CPT

## 2021-10-06 PROCEDURE — 84550 ASSAY OF BLOOD/URIC ACID: CPT

## 2021-10-06 PROCEDURE — 99215 OFFICE O/P EST HI 40 MIN: CPT | Performed by: INTERNAL MEDICINE

## 2021-10-06 PROCEDURE — 96367 TX/PROPH/DG ADDL SEQ IV INF: CPT

## 2021-10-06 PROCEDURE — 80053 COMPREHEN METABOLIC PANEL: CPT

## 2021-10-06 PROCEDURE — 96413 CHEMO IV INFUSION 1 HR: CPT

## 2021-10-06 PROCEDURE — 96361 HYDRATE IV INFUSION ADD-ON: CPT

## 2021-10-06 PROCEDURE — 96411 CHEMO IV PUSH ADDL DRUG: CPT

## 2021-10-06 RX ORDER — METHYLPREDNISOLONE SODIUM SUCCINATE 40 MG/ML
40 INJECTION, POWDER, LYOPHILIZED, FOR SOLUTION INTRAMUSCULAR; INTRAVENOUS ONCE
Status: COMPLETED | OUTPATIENT
Start: 2021-10-06 | End: 2021-10-06

## 2021-10-06 RX ORDER — DIPHENHYDRAMINE HYDROCHLORIDE 50 MG/ML
50 INJECTION INTRAMUSCULAR; INTRAVENOUS ONCE
Status: COMPLETED | OUTPATIENT
Start: 2021-10-06 | End: 2021-10-06

## 2021-10-06 RX ADMIN — METHYLPREDNISOLONE SODIUM SUCCINATE 40 MG: 40 INJECTION, POWDER, LYOPHILIZED, FOR SOLUTION INTRAMUSCULAR; INTRAVENOUS at 09:43:00

## 2021-10-06 RX ADMIN — DIPHENHYDRAMINE HYDROCHLORIDE 50 MG: 50 INJECTION INTRAMUSCULAR; INTRAVENOUS at 09:43:00

## 2021-10-06 NOTE — PATIENT INSTRUCTIONS
[place] SCHEDULE:    1. You may take prochlorperazine (compazine) today anytime. 2. You may take ondansetron (zofran) tonight at 6pm or prior to bedtime. 3. Alternate between zofran and compazinie every 4 hours for the next 2-3 days.    For example: z

## 2021-10-06 NOTE — PROGRESS NOTES
[facility] Progress Note    Patient Name: Melita Matute   YOB: 1940   Medical Record Number: [id number]   CSN: [id number]   Date of visit: 10/6/2021   Provider: ADAL Mccormick  Referring Physician: No ref.  provider found    Pr Rfl: 3  •  ondansetron (ZOFRAN) 8 MG tablet, Take 1 tablet (8 mg total) by mouth every 8 (eight) hours as needed for Nausea., Disp: 30 tablet, Rfl: 3  •  predniSONE 50 MG Oral Tab, Take 2 tablets (100 mg total) by mouth daily.  On days 1-5 each cycle, Disp: soft,nontender  Extremities:  No edema, no tenderness  Neuro:  Grossly intact  Skin:  Half dollar size erythematous urticarial lesions on L upper chest, no rash elsewhere.       Impression/Plan:    Infusion reaction:  Grade 1 due to Emend  Benadryl 50mg  So

## 2021-10-06 NOTE — PROGRESS NOTES
Hematology/Oncology Clinic Follow Up Visit    Patient Name: Anand Geronimo  Medical Record Number: [id number]    YOB: 1940    PCP: Dr. Korey Mcelroy  Other providers: Dr. Rob Flores, Dr. Aleksandra Samayoa, Dr. Tawanda Spence    Reason for Co Clinic. The biopsy shows an infiltrate of T cells which stain as CD3 positive T cells and coexpress CD2, CD5 and variable CD30. CD4 and CD8 do not appear to be coexpressed and CD7 is not expressed.   Slightly increased numbers of background CD20 and PAX 5 intermixed with numerous histiocytes and scattered intermediate sized immunoblastic cells.  There are few intermixed eosinophils.   Immunoperoxidase stains were performed at [facility] in [place], [place] (block A1: CD3, CD4, CD7, CD8, CD20, CD21, CD30,  irregular nuclei, vesicular chromatin, and abundant pale cytoplasm in a polymorphous background of small lymphocytes, eosinophils, small vessels, and scattered immunoblasts.   The neoplastic cells liborio as CD3-positive T-cells that co-express CD2, CD5, CD27 maximum.  Subcentimeter lymph nodes in the right groin without any surgery seen the right groin SUV max 2.6.  -10/1/21- bmbx- PENDING  -10/6/21- cycle 1 BV-CHP (Brentuximab vedotin 1.8 mg/kg, Cyclophosphamide 400mg/m2 IV, Doxorubicin 25mg/m2 IV all D1, Pre HERNIA,5+Y/O,REDUCIBL     • TONSILLECTOMY         Home Medications:  Prochlorperazine Maleate (COMPAZINE) 10 mg tablet, Take 1 tablet (10 mg total) by mouth every 6 (six) hours as needed for Nausea., Disp: 30 tablet, Rfl: 3  ondansetron (ZOFRAN) 8 MG table children.   Used to be an ,     Social History    Tobacco Use      Smoking status: Former Smoker        Packs/day: 0.50        Years: 14.00        Pack years: 7        Types: Cigarettes        Start date: 1/19/1953        Quit nora 10/06/2021    HGB 14.8 09/22/2021    HGB 14.6 08/02/2021    HCT 42.0 10/06/2021    MCV 95.7 10/06/2021    MCH 33.0 10/06/2021    MCHC 34.5 10/06/2021    RDW 13.2 10/06/2021    .0 10/06/2021    .0 09/22/2021    .0 08/02/2021     Lab Res valve: Trivial regurgitation. 3. Mitral valve: Mildly calcified annulus. Mitral valve demonstrates mildly thickened leaflets. There was mild regurgitation. 4. Left atrium: The left atrium was mildly dilated.    5. GL strain =-20%       Impression & Plan

## 2021-10-06 NOTE — PROGRESS NOTES
SW met with patient and spouse to introduce role of SW. Patient is a retired . He currently lives with his spouse and has 6 adult children (3 live local). Patient reports a supportive, good relationship with all of his kids.  Patient is monica

## 2021-10-06 NOTE — PROGRESS NOTES
Pt here for C1D1 Frankie/cytoxan/adcetris.   Arrives Ambulating independently, accompanied by Spouse           Pregnancy screening: Not applicable    Modifications in dose or schedule: No  After finishing Emend infusion, patient reported a nickel sized hive o

## 2021-10-07 ENCOUNTER — OFFICE VISIT (OUTPATIENT)
Dept: HEMATOLOGY/ONCOLOGY | Facility: HOSPITAL | Age: 81
End: 2021-10-07
Attending: INTERNAL MEDICINE
Payer: MEDICARE

## 2021-10-07 DIAGNOSIS — C84.48 PERIPHERAL T-CELL LYMPHOMA OF LYMPH NODES OF MULTIPLE REGIONS (HCC): Primary | ICD-10-CM

## 2021-10-07 PROCEDURE — 96372 THER/PROPH/DIAG INJ SC/IM: CPT

## 2021-10-08 LAB — T-CELL CLONALITY BY PCR: NOT DETECTED

## 2021-10-12 ENCOUNTER — APPOINTMENT (OUTPATIENT)
Dept: ALLERGY | Age: 81
End: 2021-10-12

## 2021-10-13 ENCOUNTER — OFFICE VISIT (OUTPATIENT)
Dept: HEMATOLOGY/ONCOLOGY | Facility: HOSPITAL | Age: 81
End: 2021-10-13
Attending: INTERNAL MEDICINE
Payer: MEDICARE

## 2021-10-13 VITALS
OXYGEN SATURATION: 96 % | SYSTOLIC BLOOD PRESSURE: 148 MMHG | TEMPERATURE: 98 F | HEART RATE: 73 BPM | BODY MASS INDEX: 22.64 KG/M2 | DIASTOLIC BLOOD PRESSURE: 79 MMHG | HEIGHT: 62.6 IN | WEIGHT: 126.19 LBS | RESPIRATION RATE: 18 BRPM

## 2021-10-13 DIAGNOSIS — T80.90XD INFUSION REACTION, SUBSEQUENT ENCOUNTER: ICD-10-CM

## 2021-10-13 DIAGNOSIS — K59.01 SLOW TRANSIT CONSTIPATION: ICD-10-CM

## 2021-10-13 DIAGNOSIS — C84.48 PERIPHERAL T-CELL LYMPHOMA OF LYMPH NODES OF MULTIPLE REGIONS (HCC): Primary | ICD-10-CM

## 2021-10-13 DIAGNOSIS — R74.8 ELEVATED ALKALINE PHOSPHATASE LEVEL: ICD-10-CM

## 2021-10-13 DIAGNOSIS — Z72.820 POOR SLEEP: ICD-10-CM

## 2021-10-13 DIAGNOSIS — D69.6 THROMBOCYTOPENIA (HCC): ICD-10-CM

## 2021-10-13 DIAGNOSIS — C84.48 PERIPHERAL T-CELL LYMPHOMA OF LYMPH NODES OF MULTIPLE REGIONS (HCC): ICD-10-CM

## 2021-10-13 PROBLEM — R63.4 UNINTENTIONAL WEIGHT LOSS: Status: RESOLVED | Noted: 2020-09-22 | Resolved: 2021-10-13

## 2021-10-13 PROBLEM — R53.82 CHRONIC FATIGUE: Status: RESOLVED | Noted: 2020-12-23 | Resolved: 2021-10-13

## 2021-10-13 PROCEDURE — 85007 BL SMEAR W/DIFF WBC COUNT: CPT

## 2021-10-13 PROCEDURE — 85027 COMPLETE CBC AUTOMATED: CPT

## 2021-10-13 PROCEDURE — 36591 DRAW BLOOD OFF VENOUS DEVICE: CPT

## 2021-10-13 PROCEDURE — 85025 COMPLETE CBC W/AUTO DIFF WBC: CPT

## 2021-10-13 PROCEDURE — 83615 LACTATE (LD) (LDH) ENZYME: CPT

## 2021-10-13 PROCEDURE — 80053 COMPREHEN METABOLIC PANEL: CPT

## 2021-10-13 PROCEDURE — 99215 OFFICE O/P EST HI 40 MIN: CPT | Performed by: INTERNAL MEDICINE

## 2021-10-13 PROCEDURE — 84550 ASSAY OF BLOOD/URIC ACID: CPT

## 2021-10-13 NOTE — PROGRESS NOTES
Outpatient Oncology Care Plan  Problem list:  Knowledge deficit  Fatigue  Constipation  Nausea     Problems related to:    chemotherapy  disease/disease progression  side effect of treatment     Interventions:  provided general teaching     Expected outcom

## 2021-10-13 NOTE — PROGRESS NOTES
Education Record    Learner:  Patient    Disease / Diagnosis: patient here for cll, md and possible infusion. Per Dr. Sujatha Watson, no infusion needed. Port needle was d/c'd and patient went home in stable condition.      Barriers / Limitations:  None   Comments:

## 2021-10-13 NOTE — PROGRESS NOTES
Hematology/Oncology Clinic Follow Up Visit    Patient Name: Gerson Arce  Medical Record Number: [id number]    YOB: 1940    PCP: Dr. Sade Tsang  Other providers: Dr. Aexl Senior, Dr. Denisa De Leon, Dr. Vanita Alford    Reason for Co Clinic. The biopsy shows an infiltrate of T cells which stain as CD3 positive T cells and coexpress CD2, CD5 and variable CD30. CD4 and CD8 do not appear to be coexpressed and CD7 is not expressed.   Slightly increased numbers of background CD20 and PAX 5 intermixed with numerous histiocytes and scattered intermediate sized immunoblastic cells.  There are few intermixed eosinophils.   Immunoperoxidase stains were performed at [facility] in [place], [place] (block A1: CD3, CD4, CD7, CD8, CD20, CD21, CD30,  irregular nuclei, vesicular chromatin, and abundant pale cytoplasm in a polymorphous background of small lymphocytes, eosinophils, small vessels, and scattered immunoblasts.   The neoplastic cells liborio as CD3-positive T-cells that co-express CD2, CD5, CD27 maximum.  Subcentimeter lymph nodes in the right groin without any surgery seen the right groin SUV max 2.6.  -10/1/21- bmbx-mildly hypercellular bone marrow with active trilineage hematopoiesis. No definite evidence of lymphoma. No increased blasts.   No 9/22/2021   • Pneumonia due to organism     2016   • Prostate cancer [facility])    • Sleep apnea     cpap   • Wears glasses      Past Surgical History:   Procedure Laterality Date   • ANESTH,REMOVAL OF PROSTATE     • CATARACT     • CATARACTS, OPHTHM (DMG)     • Rfl:   Fluticasone Propionate 50 MCG/ACT Nasal Suspension, 2 sprays by Each Nare route daily. , Disp: , Rfl:   Azelastine HCl 0.05 % Ophthalmic Solution, Place 1 drop into both eyes as needed.   , Disp: , Rfl:   atorvastatin 10 MG Oral Tab, Take 10 mg by m lb 13.9 oz)    ECOG PS: 1    Physical Examination:  General: Patient is alert and oriented, not in acute distress  Psych: Mood and affect are appropriate  Eyes: EOMI  ENT: Oropharynx is clear  CV: Regular rate and rhythm, no murmurs, no LE edema  Respirato <0.01 08/02/2021    PSA 0.01 11/24/2020     Component      Latest Ref Rng & Units 9/22/2021   Hbsag Screen Index       <0.10   HBSAg Screen      Nonreactive  Nonreactive   HEPATITIS B SURFACE AB QUAL      Nonreactive  Nonreactive   HEPATITIS B SURFACE AB Q only receiving incomplete infusion of emend this cycle therefore will plan to omit Emend next cycle  -If has worsening nausea and vomiting in the future can consider adding olanzapine or rechallenging with Emend.       *Elevated alkaline phosphatase  -Mild,

## 2021-10-19 RX ORDER — ESCITALOPRAM OXALATE 5 MG/1
TABLET ORAL
Qty: 30 TABLET | Refills: 0 | Status: SHIPPED | OUTPATIENT
Start: 2021-10-19 | End: 2021-11-19

## 2021-10-19 NOTE — TELEPHONE ENCOUNTER
Last refill: 09/20/21  Qty: 30  W/ 0 refills  Last ov: 12/01/20    Requested Prescriptions     Pending Prescriptions Disp Refills   • ESCITALOPRAM 5 MG Oral Tab [Pharmacy Med Name: Escitalopram Oxalate 5 MG Oral Tablet] 30 tablet 0     Sig: Take 1 tablet

## 2021-10-20 ENCOUNTER — OFFICE VISIT (OUTPATIENT)
Dept: HEMATOLOGY/ONCOLOGY | Facility: HOSPITAL | Age: 81
End: 2021-10-20
Attending: INTERNAL MEDICINE
Payer: MEDICARE

## 2021-10-20 VITALS
BODY MASS INDEX: 23.11 KG/M2 | HEIGHT: 62.6 IN | WEIGHT: 128.81 LBS | SYSTOLIC BLOOD PRESSURE: 159 MMHG | DIASTOLIC BLOOD PRESSURE: 66 MMHG | HEART RATE: 69 BPM | OXYGEN SATURATION: 97 % | TEMPERATURE: 98 F | RESPIRATION RATE: 16 BRPM

## 2021-10-20 DIAGNOSIS — R74.8 ELEVATED ALKALINE PHOSPHATASE LEVEL: ICD-10-CM

## 2021-10-20 DIAGNOSIS — D69.6 THROMBOCYTOPENIA (HCC): ICD-10-CM

## 2021-10-20 DIAGNOSIS — C84.48 PERIPHERAL T-CELL LYMPHOMA OF LYMPH NODES OF MULTIPLE REGIONS (HCC): ICD-10-CM

## 2021-10-20 PROCEDURE — 85025 COMPLETE CBC W/AUTO DIFF WBC: CPT

## 2021-10-20 PROCEDURE — 80053 COMPREHEN METABOLIC PANEL: CPT

## 2021-10-20 PROCEDURE — 36591 DRAW BLOOD OFF VENOUS DEVICE: CPT

## 2021-10-20 RX ORDER — LIDOCAINE AND PRILOCAINE 25; 25 MG/G; MG/G
CREAM TOPICAL
Qty: 1 EACH | Refills: 0 | Status: SHIPPED | OUTPATIENT
Start: 2021-10-20

## 2021-10-20 NOTE — PATIENT INSTRUCTIONS
Swish Biotene frequently, at least 3 times a day. Call if mouth sores worsen. Emla cream prescription sent to [facility] in [facility], apply to port site 30-60 min prior to lab draw and cover with plastic wrap or bandaid.

## 2021-10-20 NOTE — PROGRESS NOTES
Education Record    Learner:  Patient and Spouse    Disease / Diagnosis: low potassium and mouth sores    Barriers / Limitations:  None   Comments:    Method:  Discussion and Printed material   Comments:    General Topics:  Diet, Side effects and symptom m

## 2021-10-27 ENCOUNTER — APPOINTMENT (OUTPATIENT)
Dept: HEMATOLOGY/ONCOLOGY | Facility: HOSPITAL | Age: 81
End: 2021-10-27
Attending: INTERNAL MEDICINE
Payer: MEDICARE

## 2021-10-28 ENCOUNTER — OFFICE VISIT (OUTPATIENT)
Dept: HEMATOLOGY/ONCOLOGY | Facility: HOSPITAL | Age: 81
End: 2021-10-28
Attending: INTERNAL MEDICINE
Payer: MEDICARE

## 2021-10-28 VITALS
WEIGHT: 127.81 LBS | SYSTOLIC BLOOD PRESSURE: 128 MMHG | HEIGHT: 62.6 IN | TEMPERATURE: 98 F | HEART RATE: 87 BPM | DIASTOLIC BLOOD PRESSURE: 71 MMHG | BODY MASS INDEX: 22.93 KG/M2 | OXYGEN SATURATION: 97 % | RESPIRATION RATE: 18 BRPM

## 2021-10-28 DIAGNOSIS — K59.01 SLOW TRANSIT CONSTIPATION: ICD-10-CM

## 2021-10-28 DIAGNOSIS — Z72.820 POOR SLEEP: ICD-10-CM

## 2021-10-28 DIAGNOSIS — C84.48 PERIPHERAL T-CELL LYMPHOMA OF LYMPH NODES OF MULTIPLE REGIONS (HCC): Primary | ICD-10-CM

## 2021-10-28 DIAGNOSIS — Z88.9 DRUG HYPERSENSITIVITY: Primary | ICD-10-CM

## 2021-10-28 DIAGNOSIS — R74.8 ELEVATED ALKALINE PHOSPHATASE LEVEL: ICD-10-CM

## 2021-10-28 DIAGNOSIS — D69.6 THROMBOCYTOPENIA (HCC): ICD-10-CM

## 2021-10-28 PROCEDURE — 96375 TX/PRO/DX INJ NEW DRUG ADDON: CPT

## 2021-10-28 PROCEDURE — 83615 LACTATE (LD) (LDH) ENZYME: CPT

## 2021-10-28 PROCEDURE — 99215 OFFICE O/P EST HI 40 MIN: CPT | Performed by: INTERNAL MEDICINE

## 2021-10-28 PROCEDURE — 96413 CHEMO IV INFUSION 1 HR: CPT

## 2021-10-28 PROCEDURE — 85025 COMPLETE CBC W/AUTO DIFF WBC: CPT

## 2021-10-28 PROCEDURE — 96411 CHEMO IV PUSH ADDL DRUG: CPT

## 2021-10-28 PROCEDURE — 84550 ASSAY OF BLOOD/URIC ACID: CPT

## 2021-10-28 PROCEDURE — 96417 CHEMO IV INFUS EACH ADDL SEQ: CPT

## 2021-10-28 PROCEDURE — 80053 COMPREHEN METABOLIC PANEL: CPT

## 2021-10-28 NOTE — PROGRESS NOTES
Hematology/Oncology Clinic Follow Up Visit    Patient Name: Broderick Vuong  Medical Record Number: [id number]    YOB: 1940    PCP: Dr. Bev Ulloa  Other providers: Dr. Christopher Khan, Dr. Dina Sutton, Dr. Mike Hayes    Reason for Co Clinic. The biopsy shows an infiltrate of T cells which stain as CD3 positive T cells and coexpress CD2, CD5 and variable CD30. CD4 and CD8 do not appear to be coexpressed and CD7 is not expressed.   Slightly increased numbers of background CD20 and PAX 5 intermixed with numerous histiocytes and scattered intermediate sized immunoblastic cells.  There are few intermixed eosinophils.   Immunoperoxidase stains were performed at [facility] in [place], [place] (block A1: CD3, CD4, CD7, CD8, CD20, CD21, CD30,  irregular nuclei, vesicular chromatin, and abundant pale cytoplasm in a polymorphous background of small lymphocytes, eosinophils, small vessels, and scattered immunoblasts.   The neoplastic cells liborio as CD3-positive T-cells that co-express CD2, CD5, CD27 maximum.  Subcentimeter lymph nodes in the right groin without any surgery seen the right groin SUV max 2.6.  -10/1/21- bmbx-mildly hypercellular bone marrow with active trilineage hematopoiesis. No definite evidence of lymphoma. No increased blasts.   No to organism     2016   • Prostate cancer [facility])    • Sleep apnea     cpap   • Wears glasses      Past Surgical History:   Procedure Laterality Date   • ANESTH,REMOVAL OF PROSTATE     • CATARACT     • CATARACTS, OPHTHM ([facility])     • CHOLECYSTECTOMY  2011?    • 50 MCG/ACT Nasal Suspension, 2 sprays by Each Nare route daily. , Disp: , Rfl:   Azelastine HCl 0.05 % Ophthalmic Solution, Place 1 drop into both eyes as needed.   , Disp: , Rfl:   atorvastatin 10 MG Oral Tab, Take 10 mg by mouth nightly.  , Disp: , Rfl: 1    Physical Examination:  General: Patient is alert and oriented, not in acute distress  Psych: Mood and affect are appropriate  Eyes: EOMI  ENT: Oropharynx is clear  CV: Regular rate and rhythm, no murmurs, no LE edema  Respiratory: Lungs clear to auscu Ref Rng & Units 9/22/2021   Hbsag Screen Index       <0.10   HBSAg Screen      Nonreactive  Nonreactive   HEPATITIS B SURFACE AB QUAL      Nonreactive  Nonreactive   HEPATITIS B SURFACE AB QUANT      mIU/mL <3.10   HEPATITIS B CORE AB, TOTAL      Nonreacti the future can consider adding olanzapine or rechallenging with Emend. *Elevated alkaline phosphatase  -May have been due to chemo, was mild. Better today.     *Thrombocytopenia  -Was due to chemo, now improved  -Repeat CBC in 3 weeks    *Poor sleep

## 2021-10-28 NOTE — PROGRESS NOTES
Infusion Reaction ANP Note    Patient Name: Anu Bagley   YOB: 1940   Medical Record Number: [id number]   CSN: [id number]   Date of visit: 10/28/2021     Reaction: Shortly after completing brentuximab, patient developed 1 single raised

## 2021-10-28 NOTE — PROGRESS NOTES
Reaction note:  Medication involved - adcentris    Grade of reaction, patient symptoms at time of reaction - 1 single hive on patients chest after Adcetris infusion was complete . Patient reports the site to be \"a little itchy\".      Vital Signs taken - 1

## 2021-10-29 ENCOUNTER — OFFICE VISIT (OUTPATIENT)
Dept: HEMATOLOGY/ONCOLOGY | Facility: HOSPITAL | Age: 81
End: 2021-10-29
Attending: INTERNAL MEDICINE
Payer: MEDICARE

## 2021-10-29 DIAGNOSIS — C84.48 PERIPHERAL T-CELL LYMPHOMA OF LYMPH NODES OF MULTIPLE REGIONS (HCC): Primary | ICD-10-CM

## 2021-10-29 PROCEDURE — 96372 THER/PROPH/DIAG INJ SC/IM: CPT

## 2021-11-04 ENCOUNTER — TELEPHONE (OUTPATIENT)
Dept: HEMATOLOGY/ONCOLOGY | Facility: HOSPITAL | Age: 81
End: 2021-11-04

## 2021-11-04 NOTE — TELEPHONE ENCOUNTER
Returned call to Grabiel Gibbs he is very constipated, there is stool in rectum, hard, he is able to feel it and removed a small amount manually. Discussed use of small fleets gentle enema, use of Miralax BID and MOM. Reviewed pushing fluids.   Preferred if he

## 2021-11-05 NOTE — TELEPHONE ENCOUNTER
I attempted to reach Ruba Alexandra for a condition update. No answer. I left a voice mail message asking him to please call the office and let us know how he is feeling.

## 2021-11-08 NOTE — TELEPHONE ENCOUNTER
I attempted to reach Doloris Pace to see if his constipation has resolved. No answer. I asked him to please call the office to let us know how he is feeling.

## 2021-11-12 ENCOUNTER — APPOINTMENT (OUTPATIENT)
Dept: DERMATOLOGY | Age: 81
End: 2021-11-12

## 2021-11-16 ENCOUNTER — OFFICE VISIT (OUTPATIENT)
Dept: DERMATOLOGY | Age: 81
End: 2021-11-16

## 2021-11-16 DIAGNOSIS — L57.0 ACTINIC KERATOSES: Primary | ICD-10-CM

## 2021-11-16 PROCEDURE — 17003 DESTRUCT PREMALG LES 2-14: CPT | Performed by: DERMATOLOGY

## 2021-11-16 PROCEDURE — 17000 DESTRUCT PREMALG LESION: CPT | Performed by: DERMATOLOGY

## 2021-11-16 PROCEDURE — 99213 OFFICE O/P EST LOW 20 MIN: CPT | Performed by: DERMATOLOGY

## 2021-11-18 ENCOUNTER — OFFICE VISIT (OUTPATIENT)
Dept: HEMATOLOGY/ONCOLOGY | Facility: HOSPITAL | Age: 81
End: 2021-11-18
Attending: INTERNAL MEDICINE
Payer: MEDICARE

## 2021-11-18 VITALS
BODY MASS INDEX: 22.64 KG/M2 | HEIGHT: 62.6 IN | DIASTOLIC BLOOD PRESSURE: 76 MMHG | TEMPERATURE: 98 F | RESPIRATION RATE: 18 BRPM | SYSTOLIC BLOOD PRESSURE: 149 MMHG | OXYGEN SATURATION: 96 % | WEIGHT: 126.19 LBS | HEART RATE: 75 BPM

## 2021-11-18 DIAGNOSIS — D69.6 THROMBOCYTOPENIA (HCC): ICD-10-CM

## 2021-11-18 DIAGNOSIS — K59.01 SLOW TRANSIT CONSTIPATION: ICD-10-CM

## 2021-11-18 DIAGNOSIS — C84.48 PERIPHERAL T-CELL LYMPHOMA OF LYMPH NODES OF MULTIPLE REGIONS (HCC): Primary | ICD-10-CM

## 2021-11-18 DIAGNOSIS — Z72.820 POOR SLEEP: ICD-10-CM

## 2021-11-18 DIAGNOSIS — R53.83 FATIGUE DUE TO TREATMENT: ICD-10-CM

## 2021-11-18 PROCEDURE — 84550 ASSAY OF BLOOD/URIC ACID: CPT

## 2021-11-18 PROCEDURE — 99215 OFFICE O/P EST HI 40 MIN: CPT | Performed by: INTERNAL MEDICINE

## 2021-11-18 PROCEDURE — 85025 COMPLETE CBC W/AUTO DIFF WBC: CPT

## 2021-11-18 PROCEDURE — 96411 CHEMO IV PUSH ADDL DRUG: CPT

## 2021-11-18 PROCEDURE — 83615 LACTATE (LD) (LDH) ENZYME: CPT

## 2021-11-18 PROCEDURE — 96417 CHEMO IV INFUS EACH ADDL SEQ: CPT

## 2021-11-18 PROCEDURE — 96413 CHEMO IV INFUSION 1 HR: CPT

## 2021-11-18 PROCEDURE — 96375 TX/PRO/DX INJ NEW DRUG ADDON: CPT

## 2021-11-18 PROCEDURE — 80053 COMPREHEN METABOLIC PANEL: CPT

## 2021-11-18 NOTE — PROGRESS NOTES
Pt here for C 3 D 1 .   Arrives Ambulating independently, accompanied by Self and Family member           Pregnancy screening: Not applicable    Modifications in dose or schedule: No     Frequency of blood return and site check throughout administration: Pr

## 2021-11-18 NOTE — PROGRESS NOTES
Outpatient Oncology Care Plan  Problem list:  Knowledge deficit  Fatigue  Constipation       Problems related to:    chemotherapy  disease/disease progression  side effect of treatment     Interventions:  provided general teaching     Expected outcomes:  u

## 2021-11-18 NOTE — PROGRESS NOTES
Hematology/Oncology Clinic Follow Up Visit    Patient Name: Claudia Montez  Medical Record Number: [id number]    YOB: 1940    PCP: Dr. Ciera Cameron  Other providers: Dr. Daniel Mays, Dr. Matteo Laird, Dr. Watson Philippe    Reason for Co Clinic. The biopsy shows an infiltrate of T cells which stain as CD3 positive T cells and coexpress CD2, CD5 and variable CD30. CD4 and CD8 do not appear to be coexpressed and CD7 is not expressed.   Slightly increased numbers of background CD20 and PAX 5 intermixed with numerous histiocytes and scattered intermediate sized immunoblastic cells.  There are few intermixed eosinophils.   Immunoperoxidase stains were performed at [facility] in [place], [place] (block A1: CD3, CD4, CD7, CD8, CD20, CD21, CD30,  irregular nuclei, vesicular chromatin, and abundant pale cytoplasm in a polymorphous background of small lymphocytes, eosinophils, small vessels, and scattered immunoblasts.   The neoplastic cells liborio as CD3-positive T-cells that co-express CD2, CD5, CD27 maximum.  Subcentimeter lymph nodes in the right groin without any surgery seen the right groin SUV max 2.6.  -10/1/21- bmbx-mildly hypercellular bone marrow with active trilineage hematopoiesis. No definite evidence of lymphoma. No increased blasts.   No Hyperlipidemia    • Leaking of urine    • Osteoarthritis     knees and hands   • [facility] 9/22/2021   • Pneumonia due to organism     2016   • Prostate cancer [facility])    • Sleep apnea     cpap   • We mouth every 6 (six) hours as needed for Pain., Disp: , Rfl:   Fluticasone Furoate (ARNUITY ELLIPTA IN), Inhale 1 puff into the lungs daily. , Disp: , Rfl:   Fluticasone Propionate 50 MCG/ACT Nasal Suspension, 2 sprays by Each Nare route daily.   , Disp: , Rf oz)  10/28/21 : 58 kg (127 lb 12.8 oz)  10/20/21 : 58.4 kg (128 lb 12.8 oz)  10/13/21 : 57.2 kg (126 lb 3.2 oz)  10/06/21 : 56.2 kg (124 lb)  09/27/21 : 57.2 kg (126 lb)    ECOG PS: 1    Physical Examination:  General: Patient is alert and oriented, not in ESRML 8 12/23/2020     Lab Results   Component Value Date    CRP <0.29 12/23/2020     Lab Results   Component Value Date    PSA <0.01 08/02/2021    PSA 0.01 11/24/2020     Component      Latest Ref Rng & Units 9/22/2021   Hbsag Screen Index       <0.10 have significant nausea and vomiting issues without it therefore we will continue to hold off on giving Tennille Minus.      *Thrombocytopenia  -Was due to chemo, now improved  -Repeat CBC in 3 weeks    *Poor sleep  -Likely related to high-dose prednisone during wee

## 2021-11-19 ENCOUNTER — OFFICE VISIT (OUTPATIENT)
Dept: HEMATOLOGY/ONCOLOGY | Facility: HOSPITAL | Age: 81
End: 2021-11-19
Attending: INTERNAL MEDICINE
Payer: MEDICARE

## 2021-11-19 DIAGNOSIS — C84.48 PERIPHERAL T-CELL LYMPHOMA OF LYMPH NODES OF MULTIPLE REGIONS (HCC): Primary | ICD-10-CM

## 2021-11-19 PROCEDURE — 96372 THER/PROPH/DIAG INJ SC/IM: CPT

## 2021-11-19 NOTE — TELEPHONE ENCOUNTER
REFILL ESCITALOPRAM 5 MG, atorvastatin 10 MG, EUTHYROX 50 MCG Oral Tab, & AmLODIPine 5MG TO IVIS PEREZ

## 2021-11-20 RX ORDER — LEVOTHYROXINE SODIUM 50 UG/1
TABLET ORAL
Qty: 90 TABLET | Refills: 0 | Status: SHIPPED | OUTPATIENT
Start: 2021-11-20

## 2021-11-20 RX ORDER — ESCITALOPRAM OXALATE 5 MG/1
TABLET ORAL
Qty: 30 TABLET | Refills: 0 | OUTPATIENT
Start: 2021-11-20

## 2021-11-20 NOTE — TELEPHONE ENCOUNTER
Euthyrox: Hisotrically reported  Amlodipine: Historically reported  Escitalopram: 10/19/21 #30 w/ 0 refills    Last OV: 1/21/21  Last labs: 11/18/21    Future Appointments   Date Time Provider Kavon Palacios   12/7/2021 11:00 AM [facility] PET DOSE RM1 [facility] PET E

## 2021-11-22 ENCOUNTER — TELEPHONE (OUTPATIENT)
Dept: FAMILY MEDICINE CLINIC | Facility: CLINIC | Age: 81
End: 2021-11-22

## 2021-11-22 RX ORDER — ESCITALOPRAM OXALATE 5 MG/1
5 TABLET ORAL DAILY
Qty: 30 TABLET | Refills: 0 | Status: SHIPPED | OUTPATIENT
Start: 2021-11-22

## 2021-11-22 RX ORDER — AMLODIPINE BESYLATE 5 MG/1
5 TABLET ORAL DAILY
Qty: 90 TABLET | Refills: 0 | Status: SHIPPED | OUTPATIENT
Start: 2021-11-22

## 2021-11-22 RX ORDER — LEVOTHYROXINE SODIUM 50 UG/1
50 TABLET ORAL DAILY
Qty: 90 TABLET | Refills: 0 | Status: SHIPPED | OUTPATIENT
Start: 2021-11-22

## 2021-11-22 NOTE — TELEPHONE ENCOUNTER
Last seen Jan 2021. Last labs done 11/18/2021 per Dr Eugenie Chappell;   No lipid    Please review- atorvastatin 10 mg not ordered recently

## 2021-12-07 ENCOUNTER — HOSPITAL ENCOUNTER (OUTPATIENT)
Dept: NUCLEAR MEDICINE | Facility: HOSPITAL | Age: 81
Discharge: HOME | End: 2021-12-07
Attending: INTERNAL MEDICINE
Payer: MEDICARE

## 2021-12-07 DIAGNOSIS — C84.48 PERIPHERAL T-CELL LYMPHOMA OF LYMPH NODES OF MULTIPLE REGIONS (HCC): ICD-10-CM

## 2021-12-07 PROCEDURE — 82962 GLUCOSE BLOOD TEST: CPT

## 2021-12-07 PROCEDURE — 78815 PET IMAGE W/CT SKULL-THIGH: CPT | Performed by: INTERNAL MEDICINE

## 2021-12-09 ENCOUNTER — OFFICE VISIT (OUTPATIENT)
Dept: HEMATOLOGY/ONCOLOGY | Facility: HOSPITAL | Age: 81
End: 2021-12-09
Attending: INTERNAL MEDICINE
Payer: MEDICARE

## 2021-12-09 VITALS
SYSTOLIC BLOOD PRESSURE: 123 MMHG | WEIGHT: 122 LBS | HEIGHT: 62.6 IN | BODY MASS INDEX: 21.89 KG/M2 | HEART RATE: 111 BPM | TEMPERATURE: 98 F | DIASTOLIC BLOOD PRESSURE: 76 MMHG | RESPIRATION RATE: 16 BRPM | OXYGEN SATURATION: 95 %

## 2021-12-09 DIAGNOSIS — J18.9 ATYPICAL PNEUMONIA: ICD-10-CM

## 2021-12-09 DIAGNOSIS — R53.83 FATIGUE DUE TO TREATMENT: ICD-10-CM

## 2021-12-09 DIAGNOSIS — C84.48 PERIPHERAL T-CELL LYMPHOMA OF LYMPH NODES OF MULTIPLE REGIONS (HCC): Primary | ICD-10-CM

## 2021-12-09 DIAGNOSIS — D69.6 THROMBOCYTOPENIA (HCC): ICD-10-CM

## 2021-12-09 DIAGNOSIS — R91.8 PULMONARY INFILTRATES: ICD-10-CM

## 2021-12-09 DIAGNOSIS — K59.01 SLOW TRANSIT CONSTIPATION: ICD-10-CM

## 2021-12-09 PROCEDURE — 85025 COMPLETE CBC W/AUTO DIFF WBC: CPT

## 2021-12-09 PROCEDURE — 80053 COMPREHEN METABOLIC PANEL: CPT

## 2021-12-09 PROCEDURE — 99215 OFFICE O/P EST HI 40 MIN: CPT | Performed by: INTERNAL MEDICINE

## 2021-12-09 PROCEDURE — 84550 ASSAY OF BLOOD/URIC ACID: CPT

## 2021-12-09 PROCEDURE — 36591 DRAW BLOOD OFF VENOUS DEVICE: CPT

## 2021-12-09 PROCEDURE — 83615 LACTATE (LD) (LDH) ENZYME: CPT

## 2021-12-09 RX ORDER — LEVOFLOXACIN 500 MG/1
500 TABLET, FILM COATED ORAL DAILY
Qty: 10 TABLET | Refills: 0 | Status: SHIPPED | OUTPATIENT
Start: 2021-12-09 | End: 2022-01-06

## 2021-12-09 RX ORDER — SULFAMETHOXAZOLE AND TRIMETHOPRIM 800; 160 MG/1; MG/1
2 TABLET ORAL 3 TIMES DAILY
Qty: 126 TABLET | Refills: 0 | Status: SHIPPED | OUTPATIENT
Start: 2021-12-09 | End: 2022-01-27

## 2021-12-09 NOTE — PROGRESS NOTES
Hematology/Oncology Clinic Follow Up Visit    Patient Name: Gerson Arce  Medical Record Number: [id number]    YOB: 1940    PCP: Dr. Sade Tsang  Other providers: Dr. Axel Senior, Dr. Denisa De Leon, Dr. Vanita Alford    Reason for Co Clinic. The biopsy shows an infiltrate of T cells which stain as CD3 positive T cells and coexpress CD2, CD5 and variable CD30. CD4 and CD8 do not appear to be coexpressed and CD7 is not expressed.   Slightly increased numbers of background CD20 and PAX 5 intermixed with numerous histiocytes and scattered intermediate sized immunoblastic cells.  There are few intermixed eosinophils.   Immunoperoxidase stains were performed at [facility] in [place], [place] (block A1: CD3, CD4, CD7, CD8, CD20, CD21, CD30,  irregular nuclei, vesicular chromatin, and abundant pale cytoplasm in a polymorphous background of small lymphocytes, eosinophils, small vessels, and scattered immunoblasts.   The neoplastic cells liborio as CD3-positive T-cells that co-express CD2, CD5, CD27 maximum.  Subcentimeter lymph nodes in the right groin without any surgery seen the right groin SUV max 2.6.  -10/1/21- bmbx-mildly hypercellular bone marrow with active trilineage hematopoiesis. No definite evidence of lymphoma. No increased blasts.   No otherwise remains without fever. Had a headache yesterday as well that resolved with ibuprofen. Continues have some intermittent constipation which is better managed with MiraLAX. He denies any bleeding. No aches or pains. Appetite remains low.   Has tablet (5 mg total) by mouth daily. , Disp: 90 tablet, Rfl: 0  EUTHYROX 50 MCG Oral Tab, Take 1 tablet (50 mcg total) by mouth daily. , Disp: 90 tablet, Rfl: 0  lidocaine-prilocaine 2.5-2.5 % External Cream, Apply to site 1 hour prior to port a cath needle i Tobacco Use      Smoking status: Former Smoker        Packs/day: 0.50        Years: 14.00        Pack years: 7        Types: Cigarettes        Start date: 1953        Quit date: 1967        Years since quittin.0      Smokeless tobacco: Nkechi Palm 12/09/2021    MCV 96.8 12/09/2021    MCH 32.9 12/09/2021    MCHC 34.0 12/09/2021    RDW 16.1 12/09/2021    .0 12/09/2021    .0 11/18/2021    .0 10/28/2021     Lab Results   Component Value Date     (H) 12/09/2021    BUN 10 12/09 relaxation - grade 1 diastolic dysfunction. 2. Aortic valve: Trivial regurgitation. 3. Mitral valve: Mildly calcified annulus. Mitral valve demonstrates mildly thickened leaflets. There was mild regurgitation.    4. Left atrium: The left atrium was mild next week and refer for urgent pulmonary referral for bronchoscopy. *hives with emend infusion  -mild reaction but did not have significant nausea and vomiting issues without it therefore we will continue to hold off on giving Tennille Minus.      *Thrombocytopen

## 2021-12-09 NOTE — TELEPHONE ENCOUNTER
Future Appointments   Date Time Provider Kavon Palacios   12/9/2021  9:00 AM [address] [place] [address]   12/9/2021  9:30 AM Fernando Saucedo MD [facility] HEM [address]   12/10/2021  1:00 PM [address] [place] [address]   4/13/2022 12:20 PM BETY SAENZ

## 2021-12-09 NOTE — PROGRESS NOTES
Outpatient Oncology Care Plan  Problem list:  Knowledge deficit  Fatigue  Constipation      Problems related to:    chemotherapy  disease/disease progression  side effect of treatment     Interventions:  provided general teaching     Expected outcomes:  un

## 2021-12-09 NOTE — PROGRESS NOTES
Patient here for chemo but per Dr. Matheus Ibrahim, no treatment today. (see MD note) Treatment deferred to next week, 12/16. Port needle removed and patient discharged in stable condition.

## 2021-12-11 PROBLEM — J18.9 ATYPICAL PNEUMONIA: Status: ACTIVE | Noted: 2021-12-11

## 2021-12-16 ENCOUNTER — OFFICE VISIT (OUTPATIENT)
Dept: HEMATOLOGY/ONCOLOGY | Facility: HOSPITAL | Age: 81
End: 2021-12-16
Attending: INTERNAL MEDICINE
Payer: MEDICARE

## 2021-12-16 ENCOUNTER — TELEPHONE (OUTPATIENT)
Dept: HEMATOLOGY/ONCOLOGY | Facility: HOSPITAL | Age: 81
End: 2021-12-16

## 2021-12-16 VITALS
OXYGEN SATURATION: 95 % | TEMPERATURE: 98 F | RESPIRATION RATE: 16 BRPM | DIASTOLIC BLOOD PRESSURE: 76 MMHG | HEART RATE: 88 BPM | SYSTOLIC BLOOD PRESSURE: 125 MMHG

## 2021-12-16 DIAGNOSIS — R91.8 PULMONARY INFILTRATES: ICD-10-CM

## 2021-12-16 DIAGNOSIS — C84.48 PERIPHERAL T-CELL LYMPHOMA OF LYMPH NODES OF MULTIPLE REGIONS (HCC): Primary | ICD-10-CM

## 2021-12-16 DIAGNOSIS — R42 ORTHOSTATIC DIZZINESS: ICD-10-CM

## 2021-12-16 DIAGNOSIS — B37.0 THRUSH: ICD-10-CM

## 2021-12-16 DIAGNOSIS — R55 NEAR SYNCOPE: ICD-10-CM

## 2021-12-16 PROCEDURE — 83615 LACTATE (LD) (LDH) ENZYME: CPT

## 2021-12-16 PROCEDURE — 96360 HYDRATION IV INFUSION INIT: CPT

## 2021-12-16 PROCEDURE — 84550 ASSAY OF BLOOD/URIC ACID: CPT

## 2021-12-16 PROCEDURE — 80053 COMPREHEN METABOLIC PANEL: CPT

## 2021-12-16 PROCEDURE — 85025 COMPLETE CBC W/AUTO DIFF WBC: CPT

## 2021-12-16 PROCEDURE — 99215 OFFICE O/P EST HI 40 MIN: CPT | Performed by: NURSE PRACTITIONER

## 2021-12-16 RX ORDER — FLUCONAZOLE 100 MG/1
TABLET ORAL
Qty: 8 TABLET | Refills: 0 | Status: SHIPPED | OUTPATIENT
Start: 2021-12-16

## 2021-12-16 RX ORDER — SODIUM CHLORIDE 9 MG/ML
INJECTION, SOLUTION INTRAVENOUS ONCE
Status: COMPLETED | OUTPATIENT
Start: 2021-12-16 | End: 2021-12-16

## 2021-12-16 RX ADMIN — SODIUM CHLORIDE: 9 INJECTION, SOLUTION INTRAVENOUS at 10:16:00

## 2021-12-16 NOTE — TELEPHONE ENCOUNTER
Called and spoke with patient spouse with apt time for [facility] consult tomorrow with Pancho Tai. Pt spouse verbalized understanding. Needs to schedule an APN and infusion apt next week for possible treatment. Will have a  call and coordinate.

## 2021-12-16 NOTE — PROGRESS NOTES
Patient here for possible chemo treatment. Patient very unsteady, states dizzy upon standing since Tuesday. States he is not eating or drinking much. Wife states he has fallen few times at home. Labs drawn, KENNY Puente assessing patient.  No treatment today

## 2021-12-16 NOTE — PROGRESS NOTES
ANP Visit Note    Patient Name: Jaja Argueta   YOB: 1940   Medical Record Number: [id number]   CSN: [id number]   Date of visit: 12/16/2021       Chief Complaint/Reason for Visit:  Patient presents with:   Follow - Up: with APN prior to ch positive T cells and coexpress CD2, CD5 and variable CD30.  CD4 and CD8 do not appear to be coexpressed and CD7 is not expressed.  Slightly increased numbers of background CD20 and PAX 5+ B cells are also noted; some appear larger than normal but there is immunoblastic cells. There are few intermixed eosinophils.   Immunoperoxidase stains were performed at [facility] in [place], [place] (block A1: CD3, CD4, CD7, CD8, CD20, CD21, CD30,  (PD-1), CXCL13, ICOS, TCR betaF1, TCR delta).  In situ hybridization f polymorphous background of small lymphocytes, eosinophils, small vessels, and scattered immunoblasts.   The neoplastic cells liborio as CD3-positive T-cells that co-express CD2, CD5, , CD10 (subset weak), BCL6 (subset), CXCL13, ICOS, CD30 (partial), TCR seen the right groin SUV max 2.6.  -10/1/21- bmbx-mildly hypercellular bone marrow with active trilineage hematopoiesis. No definite evidence of lymphoma. No increased blasts. No significant dysplastic changes.   Flow cytometry and T-cell receptor gene r he has been feeling increasingly dizzy with position changes and has many near falls, most recently today in the waiting room. He was unable to stand long enough to obtain a weight. Also reports difficulty swallowing pills in the last day.      Denies fever 50 MG Oral Tab, Take 2 tablets (100 mg total) by mouth daily. On days 1-5 each cycle, Disp: 60 tablet, Rfl: 0  •  Nutritional Supplements (JUICE PLUS FIBRE OR), Take 1 tablet by mouth daily. , Disp: , Rfl:   •  Apoaequorin (PREVAGEN) 10 MG Oral Cap, Take 1 Collection Time: 12/16/21  9:00 AM   Result Value Ref Range    Uric Acid 2.7 (L) 3.5 - 7.2 mg/dL   LDH    Collection Time: 12/16/21  9:00 AM   Result Value Ref Range     (H) 87 - 241 U/L   COMP METABOLIC PANEL (14)    Collection Time: 12/16/21  9:00 in bilateral lungs  Weakness  Progressive weakness in the last week despite initiating antibiotics. LDH decreasing, labs otherwise stable.  Discussed with Dr Asuncion Santana, will hold chemo today and try to facilitate expedited evaluation with pulmonology for possi

## 2021-12-17 ENCOUNTER — TELEPHONE (OUTPATIENT)
Dept: HEMATOLOGY/ONCOLOGY | Facility: HOSPITAL | Age: 81
End: 2021-12-17

## 2021-12-17 NOTE — TELEPHONE ENCOUNTER
----- Message from Yaquelin Munoz RN sent at 12/17/2021 10:37 AM CST -----  Regarding: RE: needs apt next week  Tuesday 12/21 RN 7 at 10 am pls  ----- Message -----  From: Toya Cheng  Sent: 12/17/2021  10:32 AM CST  To: HAMLET Cueva

## 2021-12-18 ENCOUNTER — LAB ENCOUNTER (OUTPATIENT)
Dept: LAB | Age: 81
End: 2021-12-18
Attending: INTERNAL MEDICINE
Payer: MEDICARE

## 2021-12-18 DIAGNOSIS — R91.8 PULMONARY INFILTRATES: ICD-10-CM

## 2021-12-20 ENCOUNTER — MED REC SCAN ONLY (OUTPATIENT)
Dept: FAMILY MEDICINE CLINIC | Facility: CLINIC | Age: 81
End: 2021-12-20

## 2021-12-21 ENCOUNTER — APPOINTMENT (OUTPATIENT)
Dept: GENERAL RADIOLOGY | Facility: HOSPITAL | Age: 81
End: 2021-12-21
Attending: INTERNAL MEDICINE
Payer: MEDICARE

## 2021-12-21 ENCOUNTER — HOSPITAL ENCOUNTER (OUTPATIENT)
Facility: HOSPITAL | Age: 81
Setting detail: HOSPITAL OUTPATIENT SURGERY
Discharge: HOME | End: 2021-12-21
Attending: INTERNAL MEDICINE | Admitting: INTERNAL MEDICINE
Payer: MEDICARE

## 2021-12-21 ENCOUNTER — ANESTHESIA (OUTPATIENT)
Dept: ENDOSCOPY | Facility: HOSPITAL | Age: 81
End: 2021-12-21
Payer: MEDICARE

## 2021-12-21 ENCOUNTER — ANESTHESIA EVENT (OUTPATIENT)
Dept: ENDOSCOPY | Facility: HOSPITAL | Age: 81
End: 2021-12-21
Payer: MEDICARE

## 2021-12-21 VITALS
RESPIRATION RATE: 16 BRPM | TEMPERATURE: 98 F | HEART RATE: 109 BPM | WEIGHT: 122 LBS | BODY MASS INDEX: 21.89 KG/M2 | SYSTOLIC BLOOD PRESSURE: 97 MMHG | OXYGEN SATURATION: 92 % | DIASTOLIC BLOOD PRESSURE: 52 MMHG | HEIGHT: 62.5 IN

## 2021-12-21 DIAGNOSIS — R91.8 PULMONARY INFILTRATES: Primary | ICD-10-CM

## 2021-12-21 PROCEDURE — 87798 DETECT AGENT NOS DNA AMP: CPT | Performed by: INTERNAL MEDICINE

## 2021-12-21 PROCEDURE — 87116 MYCOBACTERIA CULTURE: CPT | Performed by: INTERNAL MEDICINE

## 2021-12-21 PROCEDURE — 87206 SMEAR FLUORESCENT/ACID STAI: CPT | Performed by: INTERNAL MEDICINE

## 2021-12-21 PROCEDURE — 87305 ASPERGILLUS AG IA: CPT | Performed by: INTERNAL MEDICINE

## 2021-12-21 PROCEDURE — 89050 BODY FLUID CELL COUNT: CPT | Performed by: INTERNAL MEDICINE

## 2021-12-21 PROCEDURE — 87205 SMEAR GRAM STAIN: CPT | Performed by: INTERNAL MEDICINE

## 2021-12-21 PROCEDURE — 0B9J8ZX DRAINAGE OF LEFT LOWER LUNG LOBE, VIA NATURAL OR ARTIFICIAL OPENING ENDOSCOPIC, DIAGNOSTIC: ICD-10-PCS | Performed by: INTERNAL MEDICINE

## 2021-12-21 PROCEDURE — 88104 CYTOPATH FL NONGYN SMEARS: CPT | Performed by: INTERNAL MEDICINE

## 2021-12-21 PROCEDURE — 88185 FLOWCYTOMETRY/TC ADD-ON: CPT | Performed by: INTERNAL MEDICINE

## 2021-12-21 PROCEDURE — 87176 TISSUE HOMOGENIZATION CULTR: CPT | Performed by: INTERNAL MEDICINE

## 2021-12-21 PROCEDURE — 89051 BODY FLUID CELL COUNT: CPT | Performed by: INTERNAL MEDICINE

## 2021-12-21 PROCEDURE — 87070 CULTURE OTHR SPECIMN AEROBIC: CPT | Performed by: INTERNAL MEDICINE

## 2021-12-21 PROCEDURE — 88312 SPECIAL STAINS GROUP 1: CPT | Performed by: INTERNAL MEDICINE

## 2021-12-21 PROCEDURE — 71045 X-RAY EXAM CHEST 1 VIEW: CPT | Performed by: INTERNAL MEDICINE

## 2021-12-21 PROCEDURE — 88184 FLOWCYTOMETRY/ TC 1 MARKER: CPT | Performed by: INTERNAL MEDICINE

## 2021-12-21 PROCEDURE — 87075 CULTR BACTERIA EXCEPT BLOOD: CPT | Performed by: INTERNAL MEDICINE

## 2021-12-21 PROCEDURE — 87071 CULTURE AEROBIC QUANT OTHER: CPT | Performed by: INTERNAL MEDICINE

## 2021-12-21 PROCEDURE — 88305 TISSUE EXAM BY PATHOLOGIST: CPT | Performed by: INTERNAL MEDICINE

## 2021-12-21 PROCEDURE — 87102 FUNGUS ISOLATION CULTURE: CPT | Performed by: INTERNAL MEDICINE

## 2021-12-21 PROCEDURE — 87556 M.TUBERCULO DNA AMP PROBE: CPT | Performed by: INTERNAL MEDICINE

## 2021-12-21 RX ORDER — NALOXONE HYDROCHLORIDE 0.4 MG/ML
80 INJECTION, SOLUTION INTRAMUSCULAR; INTRAVENOUS; SUBCUTANEOUS AS NEEDED
Status: DISCONTINUED | OUTPATIENT
Start: 2021-12-21 | End: 2021-12-21

## 2021-12-21 RX ORDER — HYDROCODONE BITARTRATE AND ACETAMINOPHEN 10; 325 MG/1; MG/1
1 TABLET ORAL AS NEEDED
Status: DISCONTINUED | OUTPATIENT
Start: 2021-12-21 | End: 2021-12-21

## 2021-12-21 RX ORDER — METOCLOPRAMIDE HYDROCHLORIDE 5 MG/ML
10 INJECTION INTRAMUSCULAR; INTRAVENOUS AS NEEDED
Status: DISCONTINUED | OUTPATIENT
Start: 2021-12-21 | End: 2021-12-21

## 2021-12-21 RX ORDER — LIDOCAINE HYDROCHLORIDE 10 MG/ML
INJECTION, SOLUTION EPIDURAL; INFILTRATION; INTRACAUDAL; PERINEURAL AS NEEDED
Status: DISCONTINUED | OUTPATIENT
Start: 2021-12-21 | End: 2021-12-21 | Stop reason: SURG

## 2021-12-21 RX ORDER — LIDOCAINE HYDROCHLORIDE 40 MG/ML
1 INJECTION, SOLUTION RETROBULBAR; TOPICAL ONCE
Status: DISCONTINUED | OUTPATIENT
Start: 2021-12-21 | End: 2021-12-21

## 2021-12-21 RX ORDER — ONDANSETRON 2 MG/ML
4 INJECTION INTRAMUSCULAR; INTRAVENOUS AS NEEDED
Status: DISCONTINUED | OUTPATIENT
Start: 2021-12-21 | End: 2021-12-21

## 2021-12-21 RX ORDER — HYDROMORPHONE HYDROCHLORIDE 1 MG/ML
0.4 INJECTION, SOLUTION INTRAMUSCULAR; INTRAVENOUS; SUBCUTANEOUS EVERY 5 MIN PRN
Status: DISCONTINUED | OUTPATIENT
Start: 2021-12-21 | End: 2021-12-21

## 2021-12-21 RX ORDER — ONDANSETRON 2 MG/ML
4 INJECTION INTRAMUSCULAR; INTRAVENOUS ONCE AS NEEDED
Status: DISCONTINUED | OUTPATIENT
Start: 2021-12-21 | End: 2021-12-21

## 2021-12-21 RX ORDER — PHENYLEPHRINE HCL 10 MG/ML
VIAL (ML) INJECTION AS NEEDED
Status: DISCONTINUED | OUTPATIENT
Start: 2021-12-21 | End: 2021-12-21 | Stop reason: SURG

## 2021-12-21 RX ORDER — SODIUM CHLORIDE, SODIUM LACTATE, POTASSIUM CHLORIDE, CALCIUM CHLORIDE 600; 310; 30; 20 MG/100ML; MG/100ML; MG/100ML; MG/100ML
INJECTION, SOLUTION INTRAVENOUS CONTINUOUS
Status: DISCONTINUED | OUTPATIENT
Start: 2021-12-21 | End: 2021-12-21

## 2021-12-21 RX ORDER — HYDROCODONE BITARTRATE AND ACETAMINOPHEN 10; 325 MG/1; MG/1
2 TABLET ORAL AS NEEDED
Status: DISCONTINUED | OUTPATIENT
Start: 2021-12-21 | End: 2021-12-21

## 2021-12-21 RX ORDER — LIDOCAINE HYDROCHLORIDE 20 MG/ML
INJECTION, SOLUTION EPIDURAL; INFILTRATION; INTRACAUDAL; PERINEURAL
Status: DISCONTINUED | OUTPATIENT
Start: 2021-12-21 | End: 2021-12-21

## 2021-12-21 RX ADMIN — PHENYLEPHRINE HCL 100 MCG: 10 MG/ML VIAL (ML) INJECTION at 08:54:00

## 2021-12-21 RX ADMIN — SODIUM CHLORIDE, SODIUM LACTATE, POTASSIUM CHLORIDE, CALCIUM CHLORIDE: 600; 310; 30; 20 INJECTION, SOLUTION INTRAVENOUS at 08:36:00

## 2021-12-21 RX ADMIN — SODIUM CHLORIDE, SODIUM LACTATE, POTASSIUM CHLORIDE, CALCIUM CHLORIDE: 600; 310; 30; 20 INJECTION, SOLUTION INTRAVENOUS at 09:41:00

## 2021-12-21 RX ADMIN — LIDOCAINE HYDROCHLORIDE 25 MG: 10 INJECTION, SOLUTION EPIDURAL; INFILTRATION; INTRACAUDAL; PERINEURAL at 08:44:00

## 2021-12-21 NOTE — ANESTHESIA POSTPROCEDURE EVALUATION
[address] Patient Status:  Hospital Outpatient Surgery   Age/Gender 80year old male MRN [id number]   Location [address] [address] Attending Hyacinth Guzmán MD   [facility] Day # 0 DO Carlos Alberto Chavarria

## 2021-12-21 NOTE — OPERATIVE REPORT
[address]  Patient Status:  Hospital Outpatient Surgery    1940 MRN [id number]   Location [id number] [place] [address] Attending Luci Caceres MD   [address] Day # 0 PCP Jazlyn Cheatham DO       OPERATIVE REPORT: superior subsegment,, moderate bleeding was noted, controlled with iced saline and 0.8cc of epinephrine 1:10,000.   Additional biopsies were also taken in the posterior and lateral left lower lobe subegments, again with moderate bleeding after 5-6 biopsies,

## 2021-12-21 NOTE — INTERVAL H&P NOTE
Pre-op Diagnosis: MULTIPLE INFILTRATES, LYMPHOMA    The above referenced H&P was reviewed by Cleveland Fierro MD on 12/21/2021, the patient was examined and no significant changes have occurred in the patient's condition since the H&P was performed.   I di

## 2021-12-23 ENCOUNTER — APPOINTMENT (OUTPATIENT)
Dept: HEMATOLOGY/ONCOLOGY | Facility: HOSPITAL | Age: 81
End: 2021-12-23
Attending: INTERNAL MEDICINE
Payer: MEDICARE

## 2021-12-30 ENCOUNTER — OFFICE VISIT (OUTPATIENT)
Dept: HEMATOLOGY/ONCOLOGY | Facility: HOSPITAL | Age: 81
End: 2021-12-30
Attending: INTERNAL MEDICINE
Payer: MEDICARE

## 2021-12-30 VITALS
HEIGHT: 62.6 IN | TEMPERATURE: 97 F | DIASTOLIC BLOOD PRESSURE: 69 MMHG | BODY MASS INDEX: 21.03 KG/M2 | RESPIRATION RATE: 18 BRPM | OXYGEN SATURATION: 97 % | WEIGHT: 117.19 LBS | SYSTOLIC BLOOD PRESSURE: 108 MMHG

## 2021-12-30 DIAGNOSIS — E86.0 DEHYDRATION: ICD-10-CM

## 2021-12-30 DIAGNOSIS — C84.48 PERIPHERAL T-CELL LYMPHOMA OF LYMPH NODES OF MULTIPLE REGIONS (HCC): Primary | ICD-10-CM

## 2021-12-30 PROCEDURE — 96360 HYDRATION IV INFUSION INIT: CPT

## 2021-12-30 PROCEDURE — 85025 COMPLETE CBC W/AUTO DIFF WBC: CPT

## 2021-12-30 PROCEDURE — 83615 LACTATE (LD) (LDH) ENZYME: CPT

## 2021-12-30 PROCEDURE — 80053 COMPREHEN METABOLIC PANEL: CPT

## 2021-12-30 PROCEDURE — 99215 OFFICE O/P EST HI 40 MIN: CPT | Performed by: NURSE PRACTITIONER

## 2021-12-30 PROCEDURE — 84550 ASSAY OF BLOOD/URIC ACID: CPT

## 2021-12-30 NOTE — PROGRESS NOTES
Patient presents with: Follow - Up: apn assessment    Patient here for apn assessment C4 BV CHP is expected.   Patient c/o feeling dizzy when standing stating it could be due to standing to fast- has had a lot of episodes of falling two this morning he did

## 2021-12-30 NOTE — PROGRESS NOTES
[facility] Progress Note    Patient Name: Domenica Buck   YOB: 1940   Medical Record Number: [id number]   CSN: [id number]   Date of visit: 12/30/2021     Chief Complaint/Reason for Visit:  Patient presents with:   Follow - Up: [facility]    • Asthma     in good control   • Calculus of kidney    • Cataract    • Disorder of thyroid    • Diverticulosis of large intestine    • Essential hypertension    • Hearing impairment    • Hemorrhoids     No recent issues   • High blood pressure level: Not on file    Occupational History      Not on file    Tobacco Use      Smoking status: Former Smoker        Packs/day: 0.50        Years: 14.00        Pack years: 7        Types: Cigarettes        Start date: 1/19/1953        Quit date: 12/1/1967 (COMPAZINE) 10 mg tablet, Take 1 tablet (10 mg total) by mouth every 6 (six) hours as needed for Nausea., Disp: 30 tablet, Rfl: 3  •  ondansetron (ZOFRAN) 8 MG tablet, Take 1 tablet (8 mg total) by mouth every 8 (eight) hours as needed for Nausea., Disp: 3 Anicteric, conjunctivae and sclerae clear, no oropharyngeal lesion/thrush, mucous membranes are moist   Chest: Clear to auscultation. Respirations unlabored. Heart: Regular rate and rhythm.    Abdomen: Soft, non-distended, non-tender with present bowel so Neutrophil Absolute Prelim 6.59 1.50 - 7.70 x10 (3) uL    Neutrophil Absolute 6.59 1.50 - 7.70 x10(3) uL    Lymphocyte Absolute 0.75 (L) 1.00 - 4.00 x10(3) uL    Monocyte Absolute 0.65 0.10 - 1.00 x10(3) uL    Eosinophil Absolute 0.31 0.00 - 0.70 x10(3) uL \"mini-chop\" protocol. Interim PET/CT after 3 cycles showed favorable response. He had progressively worse fatigue and possible pneumonia on imaging. He was treated empirically. He had a bronchoscopy on 12/21/2021. There were few inflammatory cells.  Due t

## 2021-12-30 NOTE — PROGRESS NOTES
Education Record    Learner:  Patient    Disease / Diagnosis: dehydration/syncope: NS bolus over 90 minutes.      Barriers / Limitations:  None   Comments:    Method:  Brief focused and Discussion   Comments:    General Topics:  Side effects and symptom man

## 2022-01-05 ENCOUNTER — HOSPITAL ENCOUNTER (OUTPATIENT)
Dept: CT IMAGING | Facility: HOSPITAL | Age: 82
Discharge: HOME | End: 2022-01-05
Attending: INTERNAL MEDICINE
Payer: MEDICARE

## 2022-01-05 DIAGNOSIS — R91.8 PULMONARY INFILTRATES: ICD-10-CM

## 2022-01-05 PROCEDURE — 71250 CT THORAX DX C-: CPT | Performed by: INTERNAL MEDICINE

## 2022-01-06 ENCOUNTER — OFFICE VISIT (OUTPATIENT)
Dept: HEMATOLOGY/ONCOLOGY | Facility: HOSPITAL | Age: 82
End: 2022-01-06
Attending: INTERNAL MEDICINE
Payer: MEDICARE

## 2022-01-06 VITALS
HEIGHT: 62.6 IN | RESPIRATION RATE: 18 BRPM | TEMPERATURE: 98 F | BODY MASS INDEX: 22.16 KG/M2 | SYSTOLIC BLOOD PRESSURE: 119 MMHG | HEART RATE: 73 BPM | OXYGEN SATURATION: 96 % | DIASTOLIC BLOOD PRESSURE: 68 MMHG | WEIGHT: 123.5 LBS

## 2022-01-06 DIAGNOSIS — J18.9 ATYPICAL PNEUMONIA: ICD-10-CM

## 2022-01-06 DIAGNOSIS — C84.48 PERIPHERAL T-CELL LYMPHOMA OF LYMPH NODES OF MULTIPLE REGIONS (HCC): Primary | ICD-10-CM

## 2022-01-06 DIAGNOSIS — K59.01 SLOW TRANSIT CONSTIPATION: ICD-10-CM

## 2022-01-06 DIAGNOSIS — R53.83 FATIGUE DUE TO TREATMENT: ICD-10-CM

## 2022-01-06 DIAGNOSIS — D69.6 THROMBOCYTOPENIA (HCC): ICD-10-CM

## 2022-01-06 LAB
ALBUMIN SERPL-MCNC: 2.9 G/DL (ref 3.4–5)
ALBUMIN/GLOB SERPL: 1 {RATIO} (ref 1–2)
ALP LIVER SERPL-CCNC: 89 U/L
ALT SERPL-CCNC: 23 U/L
ANION GAP SERPL CALC-SCNC: 7 MMOL/L (ref 0–18)
AST SERPL-CCNC: 21 U/L (ref 15–37)
BASOPHILS # BLD AUTO: 0.04 X10(3) UL (ref 0–0.2)
BASOPHILS NFR BLD AUTO: 0.8 %
BILIRUB SERPL-MCNC: 0.2 MG/DL (ref 0.1–2)
BUN BLD-MCNC: 20 MG/DL (ref 7–18)
CALCIUM BLD-MCNC: 8.7 MG/DL (ref 8.5–10.1)
CHLORIDE SERPL-SCNC: 110 MMOL/L (ref 98–112)
CO2 SERPL-SCNC: 23 MMOL/L (ref 21–32)
CREAT BLD-MCNC: 1.05 MG/DL
EOSINOPHIL # BLD AUTO: 0.3 X10(3) UL (ref 0–0.7)
EOSINOPHIL NFR BLD AUTO: 6.4 %
ERYTHROCYTE [DISTWIDTH] IN BLOOD BY AUTOMATED COUNT: 17.2 %
FASTING STATUS PATIENT QL REPORTED: NO
GLOBULIN PLAS-MCNC: 2.9 G/DL (ref 2.8–4.4)
GLUCOSE BLD-MCNC: 110 MG/DL (ref 70–99)
HCT VFR BLD AUTO: 33.1 %
HGB BLD-MCNC: 11.2 G/DL
IMM GRANULOCYTES # BLD AUTO: 0.02 X10(3) UL (ref 0–1)
IMM GRANULOCYTES NFR BLD: 0.4 %
LDH SERPL L TO P-CCNC: 237 U/L
LYMPHOCYTES # BLD AUTO: 0.57 X10(3) UL (ref 1–4)
LYMPHOCYTES NFR BLD AUTO: 12.1 %
MCH RBC QN AUTO: 33.7 PG (ref 26–34)
MCHC RBC AUTO-ENTMCNC: 33.8 G/DL (ref 31–37)
MCV RBC AUTO: 99.7 FL
MONOCYTES # BLD AUTO: 0.39 X10(3) UL (ref 0.1–1)
MONOCYTES NFR BLD AUTO: 8.3 %
NEUTROPHILS # BLD AUTO: 3.39 X10 (3) UL (ref 1.5–7.7)
NEUTROPHILS # BLD AUTO: 3.39 X10(3) UL (ref 1.5–7.7)
NEUTROPHILS NFR BLD AUTO: 72 %
OSMOLALITY SERPL CALC.SUM OF ELEC: 293 MOSM/KG (ref 275–295)
PLATELET # BLD AUTO: 138 10(3)UL (ref 150–450)
POTASSIUM SERPL-SCNC: 4 MMOL/L (ref 3.5–5.1)
PROT SERPL-MCNC: 5.8 G/DL (ref 6.4–8.2)
RBC # BLD AUTO: 3.32 X10(6)UL
SODIUM SERPL-SCNC: 140 MMOL/L (ref 136–145)
URATE SERPL-MCNC: 3.5 MG/DL
WBC # BLD AUTO: 4.7 X10(3) UL (ref 4–11)

## 2022-01-06 PROCEDURE — 99215 OFFICE O/P EST HI 40 MIN: CPT | Performed by: INTERNAL MEDICINE

## 2022-01-06 PROCEDURE — 80053 COMPREHEN METABOLIC PANEL: CPT

## 2022-01-06 PROCEDURE — 96377 APPLICATON ON-BODY INJECTOR: CPT

## 2022-01-06 PROCEDURE — 84550 ASSAY OF BLOOD/URIC ACID: CPT

## 2022-01-06 PROCEDURE — 85025 COMPLETE CBC W/AUTO DIFF WBC: CPT

## 2022-01-06 PROCEDURE — 96375 TX/PRO/DX INJ NEW DRUG ADDON: CPT

## 2022-01-06 PROCEDURE — 96411 CHEMO IV PUSH ADDL DRUG: CPT

## 2022-01-06 PROCEDURE — 83615 LACTATE (LD) (LDH) ENZYME: CPT

## 2022-01-06 PROCEDURE — 96413 CHEMO IV INFUSION 1 HR: CPT

## 2022-01-06 RX ORDER — ACYCLOVIR 400 MG/1
400 TABLET ORAL 2 TIMES DAILY
Qty: 60 TABLET | Refills: 3 | Status: SHIPPED | OUTPATIENT
Start: 2022-01-06

## 2022-01-06 RX ORDER — DIPHENHYDRAMINE HYDROCHLORIDE 50 MG/ML
25 INJECTION INTRAMUSCULAR; INTRAVENOUS ONCE
Status: COMPLETED | OUTPATIENT
Start: 2022-01-06 | End: 2022-01-06

## 2022-01-06 RX ORDER — SULFAMETHOXAZOLE AND TRIMETHOPRIM 800; 160 MG/1; MG/1
1 TABLET ORAL
Qty: 15 TABLET | Refills: 3 | Status: SHIPPED | OUTPATIENT
Start: 2022-01-07

## 2022-01-06 RX ADMIN — DIPHENHYDRAMINE HYDROCHLORIDE 25 MG: 50 INJECTION INTRAMUSCULAR; INTRAVENOUS at 13:24:00

## 2022-01-06 NOTE — PROGRESS NOTES
Outpatient Oncology Care Plan  Problem list:  Knowledge deficit  Fatigue     Problems related to:    chemotherapy  disease/disease progression  side effect of treatment     Interventions:  provided general teaching     Expected outcomes:  understands plan

## 2022-01-06 NOTE — PROGRESS NOTES
Medication involved - adcetris    Grade of reaction, patient symptoms at time of reaction - grade 1 Patient with one hive on chest    Vital Signs taken - see flowsheet    MD/APN/PA called to room, orders received - benadryl 25mg ivp and complete infusion.

## 2022-01-06 NOTE — PATIENT INSTRUCTIONS
On-body Injector for Neulasta Patient Instructions       Your On-Body Injection device was applied on this day:  1-6-22 at this time 1:30pm    Your dose of medication will start on this day: 1-7-22 at this time 4

## 2022-01-06 NOTE — PROGRESS NOTES
Hematology/Oncology Clinic Follow Up Visit    Patient Name: Mare Saha  Medical Record Number: [id number]   YOB: 1940    PCP: Dr. Verenice Collins  Other providers: Dr. Kylah Shepherd, Dr. Stacy Mattson, Dr. Henny Bell    Reason for Con Clinic. The biopsy shows an infiltrate of T cells which stain as CD3 positive T cells and coexpress CD2, CD5 and variable CD30. CD4 and CD8 do not appear to be coexpressed and CD7 is not expressed.   Slightly increased numbers of background CD20 and PAX 5 intermixed with numerous histiocytes and scattered intermediate sized immunoblastic cells.  There are few intermixed eosinophils.   Immunoperoxidase stains were performed at [facility] in [place], [place] (block A1: CD3, CD4, CD7, CD8, CD20, CD21, CD30,  irregular nuclei, vesicular chromatin, and abundant pale cytoplasm in a polymorphous background of small lymphocytes, eosinophils, small vessels, and scattered immunoblasts.   The neoplastic cells liborio as CD3-positive T-cells that co-express CD2, CD5, CD27 maximum.  Subcentimeter lymph nodes in the right groin without any surgery seen the right groin SUV max 2.6.  -10/1/21- bmbx-mildly hypercellular bone marrow with active trilineage hematopoiesis. No definite evidence of lymphoma. No increased blasts.   No reduced brentuximab to 1.4mg/m2 d/t possible pneumonitis and fatigue)     ===============================  Interval events: Presents for follow-up and for delayed cycle 4 chemotherapy. Overall he is feeling much better.   His energy and appetite are much i SPARING  2008   • OTHER  08/31/2021    Left inguinal lymph node biopsy   • OTHER SURGICAL HISTORY  11/11/2020    VENTRAL INCISIONAL HERNIA REPAIR, PRIMARY, LEFT AXILLARY LYMPH NODE EXCISIONAL BIOPSY   • REMOVAL GALLBLADDER     • REPAIR ING HERNIA,5+Y/O,RED Azelastine HCl 0.05 % Ophthalmic Solution, Place 1 drop into both eyes as needed.   , Disp: , Rfl:   atorvastatin 10 MG Oral Tab, Take 10 mg by mouth nightly.  , Disp: , Rfl:       Allergies:     Emend [Aprepitant]      HIVES    Comment:One single hive af distress  Psych: Mood and affect are appropriate  Eyes: EOMI  ENT: Oropharynx is clear  CV: Regular rate and rhythm, no murmurs, no LE edema  Respiratory: Lungs clear to auscultation bilaterally  GI/Abd: Soft, non-tender with normoactive bowel sounds, no h 08/02/2021    PSA 0.01 11/24/2020     Component      Latest Ref Rng & Units 9/22/2021   Hbsag Screen Index       <0.10   HBSAg Screen      Nonreactive  Nonreactive   HEPATITIS B SURFACE AB QUAL      Nonreactive  Nonreactive   HEPATITIS B SURFACE AB QUANT support   Cycle length is 21 days  -plan for 6 cycles total with next repeat PET/CT scan at end of treatment    *Multifocal pulmonary opacities  -Developed after 3 cycles on interim PET/CT - concern for possible atypical pneumonia vs PJP (given elevated LD

## 2022-01-08 DIAGNOSIS — C84.48 PERIPHERAL T-CELL LYMPHOMA OF LYMPH NODES OF MULTIPLE REGIONS (HCC): ICD-10-CM

## 2022-01-10 RX ORDER — PREDNISONE 50 MG/1
TABLET ORAL
Qty: 60 TABLET | Refills: 2 | Status: SHIPPED | OUTPATIENT
Start: 2022-01-10

## 2022-01-13 ENCOUNTER — OFFICE VISIT (OUTPATIENT)
Dept: HEMATOLOGY/ONCOLOGY | Facility: HOSPITAL | Age: 82
End: 2022-01-13
Attending: INTERNAL MEDICINE
Payer: MEDICARE

## 2022-01-13 VITALS
WEIGHT: 132.81 LBS | BODY MASS INDEX: 24 KG/M2 | DIASTOLIC BLOOD PRESSURE: 74 MMHG | RESPIRATION RATE: 18 BRPM | SYSTOLIC BLOOD PRESSURE: 146 MMHG | TEMPERATURE: 98 F | HEART RATE: 82 BPM

## 2022-01-13 DIAGNOSIS — K59.01 SLOW TRANSIT CONSTIPATION: ICD-10-CM

## 2022-01-13 DIAGNOSIS — R53.83 FATIGUE DUE TO TREATMENT: ICD-10-CM

## 2022-01-13 DIAGNOSIS — Z51.11 ENCOUNTER FOR CHEMOTHERAPY MANAGEMENT: ICD-10-CM

## 2022-01-13 DIAGNOSIS — C84.48 PERIPHERAL T-CELL LYMPHOMA OF LYMPH NODES OF MULTIPLE REGIONS (HCC): ICD-10-CM

## 2022-01-13 DIAGNOSIS — D69.6 THROMBOCYTOPENIA (HCC): ICD-10-CM

## 2022-01-13 DIAGNOSIS — C84.48 PERIPHERAL T-CELL LYMPHOMA OF LYMPH NODES OF MULTIPLE REGIONS (HCC): Primary | ICD-10-CM

## 2022-01-13 LAB
ALBUMIN SERPL-MCNC: 3 G/DL (ref 3.4–5)
ALBUMIN/GLOB SERPL: 1.2 {RATIO} (ref 1–2)
ALP LIVER SERPL-CCNC: 120 U/L
ALT SERPL-CCNC: 46 U/L
ANION GAP SERPL CALC-SCNC: 8 MMOL/L (ref 0–18)
AST SERPL-CCNC: 29 U/L (ref 15–37)
BASOPHILS # BLD: 0 X10(3) UL (ref 0–0.2)
BASOPHILS NFR BLD: 0 %
BILIRUB SERPL-MCNC: 0.6 MG/DL (ref 0.1–2)
BUN BLD-MCNC: 19 MG/DL (ref 7–18)
CALCIUM BLD-MCNC: 8.7 MG/DL (ref 8.5–10.1)
CHLORIDE SERPL-SCNC: 104 MMOL/L (ref 98–112)
CO2 SERPL-SCNC: 25 MMOL/L (ref 21–32)
CREAT BLD-MCNC: 0.76 MG/DL
EOSINOPHIL # BLD: 0 X10(3) UL (ref 0–0.7)
EOSINOPHIL NFR BLD: 0 %
ERYTHROCYTE [DISTWIDTH] IN BLOOD BY AUTOMATED COUNT: 16.2 %
FASTING STATUS PATIENT QL REPORTED: NO
GLOBULIN PLAS-MCNC: 2.6 G/DL (ref 2.8–4.4)
GLUCOSE BLD-MCNC: 88 MG/DL (ref 70–99)
HCT VFR BLD AUTO: 33.1 %
HGB BLD-MCNC: 10.8 G/DL
LYMPHOCYTES NFR BLD: 18 %
LYMPHOCYTES NFR BLD: 2.21 X10(3) UL (ref 1–4)
MCH RBC QN AUTO: 33.5 PG (ref 26–34)
MCHC RBC AUTO-ENTMCNC: 32.6 G/DL (ref 31–37)
MCV RBC AUTO: 102.8 FL
METAMYELOCYTES # BLD: 0.25 X10(3) UL
METAMYELOCYTES NFR BLD: 2 %
MONOCYTES # BLD: 0.74 X10(3) UL (ref 0.1–1)
MONOCYTES NFR BLD: 6 %
NEUTROPHILS # BLD AUTO: 9 X10 (3) UL (ref 1.5–7.7)
NEUTROPHILS NFR BLD: 70 %
NEUTS BAND NFR BLD: 4 %
NEUTS HYPERSEG # BLD: 9.1 X10(3) UL (ref 1.5–7.7)
OSMOLALITY SERPL CALC.SUM OF ELEC: 286 MOSM/KG (ref 275–295)
PLATELET # BLD AUTO: 148 10(3)UL (ref 150–450)
PLATELET MORPHOLOGY: NORMAL
POTASSIUM SERPL-SCNC: 3.5 MMOL/L (ref 3.5–5.1)
PROT SERPL-MCNC: 5.6 G/DL (ref 6.4–8.2)
RBC # BLD AUTO: 3.22 X10(6)UL
SODIUM SERPL-SCNC: 137 MMOL/L (ref 136–145)
TOTAL CELLS COUNTED BLD: 100
WBC # BLD AUTO: 12.3 X10(3) UL (ref 4–11)

## 2022-01-13 PROCEDURE — 36591 DRAW BLOOD OFF VENOUS DEVICE: CPT

## 2022-01-13 PROCEDURE — 85027 COMPLETE CBC AUTOMATED: CPT

## 2022-01-13 PROCEDURE — 85007 BL SMEAR W/DIFF WBC COUNT: CPT

## 2022-01-13 PROCEDURE — 99215 OFFICE O/P EST HI 40 MIN: CPT | Performed by: NURSE PRACTITIONER

## 2022-01-13 PROCEDURE — 85025 COMPLETE CBC W/AUTO DIFF WBC: CPT

## 2022-01-13 PROCEDURE — 80053 COMPREHEN METABOLIC PANEL: CPT

## 2022-01-13 NOTE — PROGRESS NOTES
ANP Visit Note    Patient Name: Makeda Fernández   YOB: 1940   Medical Record Number: [id number]   CSN: [id number]   Date of visit: 1/13/2022       Chief Complaint/Reason for Visit:  Patient presents with:   Follow - Up: APN assessment - lab cells which stain as CD3 positive T cells and coexpress CD2, CD5 and variable CD30.  CD4 and CD8 do not appear to be coexpressed and CD7 is not expressed.  Slightly increased numbers of background CD20 and PAX 5+ B cells are also noted; some appear larger scattered intermediate sized immunoblastic cells.  There are few intermixed eosinophils.   Immunoperoxidase stains were performed at [facility] in [place], [place] (block A1: CD3, CD4, CD7, CD8, CD20, CD21, CD30,  (PD-1), CXCL13, ICOS, TCR betaF1, TCR de abundant pale cytoplasm in a polymorphous background of small lymphocytes, eosinophils, small vessels, and scattered immunoblasts.   The neoplastic cells liborio as CD3-positive T-cells that co-express CD2, CD5, , CD10 (subset weak), BCL6 (subset), CXCL1 groin without any surgery seen the right groin SUV max 2.6.  -10/1/21- bmbx-mildly hypercellular bone marrow with active trilineage hematopoiesis. No definite evidence of lymphoma. No increased blasts. No significant dysplastic changes.   Flow cytometry 1.4mg/m2 d/t possible pneumonitis and fatigue)     History of Present Illness:     Olaf Phelps is a 80year old patient of Dr. 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  Presents today fo tablet, Rfl: 0  •  amLODIPine 5 MG Oral Tab, Take 1 tablet (5 mg total) by mouth daily. , Disp: 90 tablet, Rfl: 0  •  EUTHYROX 50 MCG Oral Tab, Take 1 tablet (50 mcg total) by mouth daily. , Disp: 90 tablet, Rfl: 0  •  lidocaine-prilocaine 2.5-2.5 % External oriented x 3, not in acute distress, appears improved from our previous encounter. HEENT: Anicteric, conjunctivae and sclerae clear, no oropharyngeal lesion/thrush, mucous membranes are moist.   Chest: Clear to auscultation, respirations unlabored.   Hear Manual 2.21 1.00 - 4.00 x10(3) uL    Monocyte Absolute Manual 0.74 0.10 - 1.00 x10(3) uL    Eosinophil Absolute Manual 0.00 0.00 - 0.70 x10(3) uL    Basophil Absolute Manual 0.00 0.00 - 0.20 x10(3) uL    Metamyelocyte Absolute Manual 0.25 (H) 0 x10(3) uL

## 2022-01-13 NOTE — PROGRESS NOTES
Education Record    Learner:  Patient and Spouse    Disease / Diagnosis: Lymphoma    Barriers / Limitations:  None   Comments:    Method:  Brief focused   Comments:    General Topics:  Plan of care reviewed   Comments:    Outcome:  Shows understanding   Co

## 2022-01-13 NOTE — PROGRESS NOTES
Patient presents with: Follow - Up: APN assessment - lab follow up    Pt is here for lab follow up; he was last treated on 01/06/2022 C4 D1 doxorubicin/cytoxan/adcetris. Pt and spouse report increased appetite; up 9 lbs today. Denies N,V,D.  Continued cons

## 2022-01-18 ENCOUNTER — TELEPHONE (OUTPATIENT)
Dept: HEMATOLOGY/ONCOLOGY | Facility: HOSPITAL | Age: 82
End: 2022-01-18

## 2022-01-18 NOTE — TELEPHONE ENCOUNTER
Toxicities: C4 D1 Beentuximab Vedotin/Cyclophosphamide/Doxorubicin/Prednisone/Pegfilgrastim-herminio on 1/6/2022    Constipation/Diarrhea/Dehydration    Constipation/Diarrhea Akhil Carvajal reports that he stopped taking his daily miralax a week and a half ago.  By l

## 2022-01-18 NOTE — TELEPHONE ENCOUNTER
Patient calling stating for a couple of weeks  He has had extreme bouts of constipation and diarrhea.    Stated he had taken some pills ( can not remember the name ) more than 3 days ago for the constipation,    Stated he has had diarrhea since this past Fr

## 2022-01-19 ENCOUNTER — TELEPHONE (OUTPATIENT)
Dept: HEMATOLOGY/ONCOLOGY | Facility: HOSPITAL | Age: 82
End: 2022-01-19

## 2022-01-19 NOTE — TELEPHONE ENCOUNTER
I made an attempt to reach [facility]. No answer. I left a voice mail message asking him call the office with an update on how he is feeling.

## 2022-01-19 NOTE — TELEPHONE ENCOUNTER
Raymundo Arias at [phone], patient is returning your sick call from 1/18/22, Dr. Giana Marroquin pt.

## 2022-01-19 NOTE — TELEPHONE ENCOUNTER
Muna Deutsch called back. He reports that he is feeling better. He has not had any diarrhea today. He pushed water and gatorade yesterday and he does not feel light headed or dizzy today. He ate breakfast. He is just about to eat lunch.  He has drank Rashard Shake of [place]

## 2022-01-20 ENCOUNTER — TELEPHONE (OUTPATIENT)
Dept: HEMATOLOGY/ONCOLOGY | Facility: HOSPITAL | Age: 82
End: 2022-01-20

## 2022-01-20 NOTE — TELEPHONE ENCOUNTER
Toxicities: C4 D1 Brentuximab Vedotin/Cyclphosphamide/Doxorubicin/Prednisone/Pegfilgrastim on 1/6/2022    Diarrhea/Dehydration    Diarrhea: Grade 1 (Henry said he just had a very large, loose, brown bowel movement. No abdominal pain or cramping.  No hematoch

## 2022-01-21 ENCOUNTER — OFFICE VISIT (OUTPATIENT)
Dept: HEMATOLOGY/ONCOLOGY | Facility: HOSPITAL | Age: 82
End: 2022-01-21
Attending: INTERNAL MEDICINE
Payer: MEDICARE

## 2022-01-21 VITALS
HEART RATE: 76 BPM | BODY MASS INDEX: 21.46 KG/M2 | SYSTOLIC BLOOD PRESSURE: 137 MMHG | RESPIRATION RATE: 18 BRPM | HEIGHT: 62.6 IN | OXYGEN SATURATION: 98 % | DIASTOLIC BLOOD PRESSURE: 82 MMHG | TEMPERATURE: 98 F | WEIGHT: 119.63 LBS

## 2022-01-21 DIAGNOSIS — E86.0 DEHYDRATION: ICD-10-CM

## 2022-01-21 DIAGNOSIS — E87.6 HYPOKALEMIA: ICD-10-CM

## 2022-01-21 DIAGNOSIS — C84.48 PERIPHERAL T-CELL LYMPHOMA OF LYMPH NODES OF MULTIPLE REGIONS (HCC): Primary | ICD-10-CM

## 2022-01-21 DIAGNOSIS — R19.7 DIARRHEA, UNSPECIFIED TYPE: ICD-10-CM

## 2022-01-21 DIAGNOSIS — C84.48 PERIPHERAL T-CELL LYMPHOMA OF LYMPH NODES OF MULTIPLE REGIONS (HCC): ICD-10-CM

## 2022-01-21 DIAGNOSIS — L03.116 LEFT LEG CELLULITIS: Primary | ICD-10-CM

## 2022-01-21 LAB
ALBUMIN SERPL-MCNC: 2.8 G/DL (ref 3.4–5)
ALBUMIN/GLOB SERPL: 0.9 {RATIO} (ref 1–2)
ALP LIVER SERPL-CCNC: 102 U/L
ALT SERPL-CCNC: 23 U/L
ANION GAP SERPL CALC-SCNC: 4 MMOL/L (ref 0–18)
AST SERPL-CCNC: 16 U/L (ref 15–37)
BASOPHILS # BLD AUTO: 0.11 X10(3) UL (ref 0–0.2)
BASOPHILS NFR BLD AUTO: 1.2 %
BILIRUB SERPL-MCNC: 0.5 MG/DL (ref 0.1–2)
BUN BLD-MCNC: 10 MG/DL (ref 7–18)
CALCIUM BLD-MCNC: 8.5 MG/DL (ref 8.5–10.1)
CHLORIDE SERPL-SCNC: 109 MMOL/L (ref 98–112)
CO2 SERPL-SCNC: 25 MMOL/L (ref 21–32)
CREAT BLD-MCNC: 0.59 MG/DL
EOSINOPHIL # BLD AUTO: 0.03 X10(3) UL (ref 0–0.7)
EOSINOPHIL NFR BLD AUTO: 0.3 %
ERYTHROCYTE [DISTWIDTH] IN BLOOD BY AUTOMATED COUNT: 16.7 %
FASTING STATUS PATIENT QL REPORTED: NO
GLOBULIN PLAS-MCNC: 3.2 G/DL (ref 2.8–4.4)
GLUCOSE BLD-MCNC: 94 MG/DL (ref 70–99)
HCT VFR BLD AUTO: 35.4 %
HGB BLD-MCNC: 11.8 G/DL
IMM GRANULOCYTES # BLD AUTO: 0.12 X10(3) UL (ref 0–1)
IMM GRANULOCYTES NFR BLD: 1.3 %
LYMPHOCYTES # BLD AUTO: 0.66 X10(3) UL (ref 1–4)
LYMPHOCYTES NFR BLD AUTO: 6.9 %
MAGNESIUM SERPL-MCNC: 2.4 MG/DL (ref 1.6–2.6)
MCH RBC QN AUTO: 33.7 PG (ref 26–34)
MCHC RBC AUTO-ENTMCNC: 33.3 G/DL (ref 31–37)
MCV RBC AUTO: 101.1 FL
MONOCYTES # BLD AUTO: 0.68 X10(3) UL (ref 0.1–1)
MONOCYTES NFR BLD AUTO: 7.2 %
NEUTROPHILS # BLD AUTO: 7.9 X10 (3) UL (ref 1.5–7.7)
NEUTROPHILS # BLD AUTO: 7.9 X10(3) UL (ref 1.5–7.7)
NEUTROPHILS NFR BLD AUTO: 83.1 %
OSMOLALITY SERPL CALC.SUM OF ELEC: 285 MOSM/KG (ref 275–295)
PLATELET # BLD AUTO: 219 10(3)UL (ref 150–450)
POTASSIUM SERPL-SCNC: 3.3 MMOL/L (ref 3.5–5.1)
PROT SERPL-MCNC: 6 G/DL (ref 6.4–8.2)
RBC # BLD AUTO: 3.5 X10(6)UL
SODIUM SERPL-SCNC: 138 MMOL/L (ref 136–145)
WBC # BLD AUTO: 9.5 X10(3) UL (ref 4–11)

## 2022-01-21 PROCEDURE — 99215 OFFICE O/P EST HI 40 MIN: CPT | Performed by: NURSE PRACTITIONER

## 2022-01-21 PROCEDURE — 96360 HYDRATION IV INFUSION INIT: CPT

## 2022-01-21 PROCEDURE — 85025 COMPLETE CBC W/AUTO DIFF WBC: CPT

## 2022-01-21 PROCEDURE — 83735 ASSAY OF MAGNESIUM: CPT

## 2022-01-21 PROCEDURE — 80053 COMPREHEN METABOLIC PANEL: CPT

## 2022-01-21 RX ORDER — CEPHALEXIN 500 MG/1
500 CAPSULE ORAL 4 TIMES DAILY
Qty: 40 CAPSULE | Refills: 0 | Status: SHIPPED | OUTPATIENT
Start: 2022-01-21

## 2022-01-21 RX ORDER — POTASSIUM CHLORIDE 750 MG/1
20 TABLET, EXTENDED RELEASE ORAL ONCE
Status: CANCELLED
Start: 2022-01-21 | End: 2022-01-21

## 2022-01-21 RX ORDER — POTASSIUM CHLORIDE 750 MG/1
20 TABLET, EXTENDED RELEASE ORAL ONCE
Status: COMPLETED | OUTPATIENT
Start: 2022-01-21 | End: 2022-01-21

## 2022-01-21 RX ADMIN — POTASSIUM CHLORIDE 20 MEQ: 750 TABLET, EXTENDED RELEASE ORAL at 13:16:00

## 2022-01-21 NOTE — PROGRESS NOTES
Patient presents with:  Acute: apn assessment    Patient here for acute apn assessment.  Patient c/o constipation that has turned into diarrhea this past week- patient states he feels very light headed and dizzy when trying to stand when asked how many epis

## 2022-01-21 NOTE — PROGRESS NOTES
Cancer Center Progress Note    Patient Name: Eugenio Morrison   YOB: 1940   Medical Record Number: [id number]   CSN: [id number]   Date of visit: 1/21/2022     Chief Complaint/Reason for Visit:  Patient presents with:  Acute: apn assessment Examination prior to chemotherapy     Infusion reaction     Thrombocytopenia (HCC)     Poor sleep     Elevated alkaline phosphatase level     Fatigue due to treatment     Atypical pneumonia       Medical History:  Past Medical History:   Diagnosis Date   • Cancer Mother 48        Brain   • Colon Cancer Father 68   • Cancer Brother 54        Mesothelioma       Social History:  Social History    Socioeconomic History      Marital status:       Spouse name: Not on file      Number of children: Not on shira day and then one tablet daily. , Disp: 8 tablet, Rfl: 0  •  escitalopram 5 MG Oral Tab, Take 1 tablet (5 mg total) by mouth daily. , Disp: 30 tablet, Rfl: 0  •  amLODIPine 5 MG Oral Tab, Take 1 tablet (5 mg total) by mouth daily. , Disp: 90 tablet, Rfl: 0  • hours.    Physical Examination:  General: Patient is alert and oriented x 3, in wheelchair, not in acute distress.   Vital Signs: Height: 159 cm (5' 2.6\") (01/21 1138)  Weight: 54.3 kg (119 lb 9.6 oz) (01/21 1138)  BSA (Calculated - sq m): 1.55 sq meters ( Range    WBC 9.5 4.0 - 11.0 x10(3) uL    RBC 3.50 (L) 3.80 - 5.80 x10(6)uL    HGB 11.8 (L) 13.0 - 17.5 g/dL    HCT 35.4 (L) 39.0 - 53.0 %    .0 150.0 - 450.0 10(3)uL    .1 (H) 80.0 - 100.0 fL    MCH 33.7 26.0 - 34.0 pg    MCHC 33.3 31.0 - 37.

## 2022-01-24 ENCOUNTER — TELEPHONE (OUTPATIENT)
Dept: HEMATOLOGY/ONCOLOGY | Facility: HOSPITAL | Age: 82
End: 2022-01-24

## 2022-01-24 NOTE — TELEPHONE ENCOUNTER
Muna Deutsch called and has question on his abx, he is prescribed Cephalexin 500 mg QID for cellulitis he is taking, is he also suppose to be taking the Sulfamethoxazole-Trimethoprim -160 mg 3 times per week? Please advise.

## 2022-01-24 NOTE — TELEPHONE ENCOUNTER
Patient reports improvement in pain and redness of left upper leg. Continue Keflex. Seeing Dr. Marcus Wood this week.

## 2022-01-24 NOTE — TELEPHONE ENCOUNTER
Hayes Aparicio with instructs that he is indeed to be taking both medications as prescribed, he verbalizes understanding.

## 2022-01-25 ENCOUNTER — APPOINTMENT (OUTPATIENT)
Dept: FAMILY MEDICINE | Age: 82
End: 2022-01-25

## 2022-01-27 ENCOUNTER — TELEPHONE (OUTPATIENT)
Dept: HEMATOLOGY/ONCOLOGY | Facility: HOSPITAL | Age: 82
End: 2022-01-27

## 2022-01-27 ENCOUNTER — OFFICE VISIT (OUTPATIENT)
Dept: HEMATOLOGY/ONCOLOGY | Facility: HOSPITAL | Age: 82
End: 2022-01-27
Attending: INTERNAL MEDICINE
Payer: MEDICARE

## 2022-01-27 ENCOUNTER — HOSPITAL ENCOUNTER (OUTPATIENT)
Dept: ULTRASOUND IMAGING | Facility: HOSPITAL | Age: 82
Discharge: HOME | End: 2022-01-27
Attending: INTERNAL MEDICINE
Payer: MEDICARE

## 2022-01-27 VITALS
OXYGEN SATURATION: 98 % | HEART RATE: 85 BPM | SYSTOLIC BLOOD PRESSURE: 116 MMHG | TEMPERATURE: 97 F | DIASTOLIC BLOOD PRESSURE: 74 MMHG | WEIGHT: 125 LBS | RESPIRATION RATE: 16 BRPM | HEIGHT: 62.6 IN | BODY MASS INDEX: 22.43 KG/M2

## 2022-01-27 DIAGNOSIS — R53.83 FATIGUE DUE TO TREATMENT: ICD-10-CM

## 2022-01-27 DIAGNOSIS — M79.89 LEFT LEG SWELLING: ICD-10-CM

## 2022-01-27 DIAGNOSIS — M79.605 LEFT LEG PAIN: ICD-10-CM

## 2022-01-27 DIAGNOSIS — D69.6 THROMBOCYTOPENIA (HCC): ICD-10-CM

## 2022-01-27 DIAGNOSIS — C84.48 PERIPHERAL T-CELL LYMPHOMA OF LYMPH NODES OF MULTIPLE REGIONS (HCC): Primary | ICD-10-CM

## 2022-01-27 DIAGNOSIS — R63.8 POOR FLUID INTAKE: ICD-10-CM

## 2022-01-27 DIAGNOSIS — R63.0 POOR APPETITE: ICD-10-CM

## 2022-01-27 DIAGNOSIS — K59.01 SLOW TRANSIT CONSTIPATION: ICD-10-CM

## 2022-01-27 LAB
ALBUMIN SERPL-MCNC: 3.1 G/DL (ref 3.4–5)
ALBUMIN/GLOB SERPL: 1 {RATIO} (ref 1–2)
ALP LIVER SERPL-CCNC: 115 U/L
ALT SERPL-CCNC: 22 U/L
ANION GAP SERPL CALC-SCNC: 6 MMOL/L (ref 0–18)
AST SERPL-CCNC: 21 U/L (ref 15–37)
BASOPHILS # BLD AUTO: 0.16 X10(3) UL (ref 0–0.2)
BASOPHILS NFR BLD AUTO: 2.2 %
BILIRUB SERPL-MCNC: 0.4 MG/DL (ref 0.1–2)
BUN BLD-MCNC: 11 MG/DL (ref 7–18)
CALCIUM BLD-MCNC: 9.1 MG/DL (ref 8.5–10.1)
CHLORIDE SERPL-SCNC: 107 MMOL/L (ref 98–112)
CO2 SERPL-SCNC: 26 MMOL/L (ref 21–32)
CREAT BLD-MCNC: 0.94 MG/DL
EOSINOPHIL # BLD AUTO: 0.02 X10(3) UL (ref 0–0.7)
EOSINOPHIL NFR BLD AUTO: 0.3 %
ERYTHROCYTE [DISTWIDTH] IN BLOOD BY AUTOMATED COUNT: 16.4 %
GLOBULIN PLAS-MCNC: 3.2 G/DL (ref 2.8–4.4)
GLUCOSE BLD-MCNC: 109 MG/DL (ref 70–99)
HCT VFR BLD AUTO: 36.5 %
HGB BLD-MCNC: 12.4 G/DL
IMM GRANULOCYTES # BLD AUTO: 0.06 X10(3) UL (ref 0–1)
IMM GRANULOCYTES NFR BLD: 0.8 %
LDH SERPL L TO P-CCNC: 276 U/L
LYMPHOCYTES # BLD AUTO: 0.76 X10(3) UL (ref 1–4)
LYMPHOCYTES NFR BLD AUTO: 10.6 %
MCH RBC QN AUTO: 33.8 PG (ref 26–34)
MCHC RBC AUTO-ENTMCNC: 34 G/DL (ref 31–37)
MCV RBC AUTO: 99.5 FL
MONOCYTES # BLD AUTO: 0.52 X10(3) UL (ref 0.1–1)
MONOCYTES NFR BLD AUTO: 7.3 %
NEUTROPHILS # BLD AUTO: 5.65 X10 (3) UL (ref 1.5–7.7)
NEUTROPHILS # BLD AUTO: 5.65 X10(3) UL (ref 1.5–7.7)
NEUTROPHILS NFR BLD AUTO: 78.8 %
OSMOLALITY SERPL CALC.SUM OF ELEC: 288 MOSM/KG (ref 275–295)
PLATELET # BLD AUTO: 322 10(3)UL (ref 150–450)
POTASSIUM SERPL-SCNC: 3.9 MMOL/L (ref 3.5–5.1)
PROT SERPL-MCNC: 6.3 G/DL (ref 6.4–8.2)
RBC # BLD AUTO: 3.67 X10(6)UL
SODIUM SERPL-SCNC: 139 MMOL/L (ref 136–145)
URATE SERPL-MCNC: 3.3 MG/DL
WBC # BLD AUTO: 7.2 X10(3) UL (ref 4–11)

## 2022-01-27 PROCEDURE — 93971 EXTREMITY STUDY: CPT | Performed by: INTERNAL MEDICINE

## 2022-01-27 PROCEDURE — 84550 ASSAY OF BLOOD/URIC ACID: CPT

## 2022-01-27 PROCEDURE — 96361 HYDRATE IV INFUSION ADD-ON: CPT

## 2022-01-27 PROCEDURE — 85025 COMPLETE CBC W/AUTO DIFF WBC: CPT

## 2022-01-27 PROCEDURE — 99215 OFFICE O/P EST HI 40 MIN: CPT | Performed by: INTERNAL MEDICINE

## 2022-01-27 PROCEDURE — 96411 CHEMO IV PUSH ADDL DRUG: CPT

## 2022-01-27 PROCEDURE — 96413 CHEMO IV INFUSION 1 HR: CPT

## 2022-01-27 PROCEDURE — 96417 CHEMO IV INFUS EACH ADDL SEQ: CPT

## 2022-01-27 PROCEDURE — 96375 TX/PRO/DX INJ NEW DRUG ADDON: CPT

## 2022-01-27 PROCEDURE — 83615 LACTATE (LD) (LDH) ENZYME: CPT

## 2022-01-27 PROCEDURE — 96377 APPLICATON ON-BODY INJECTOR: CPT

## 2022-01-27 PROCEDURE — 80053 COMPREHEN METABOLIC PANEL: CPT

## 2022-01-27 RX ORDER — SODIUM CHLORIDE 9 MG/ML
500 INJECTION, SOLUTION INTRAVENOUS ONCE
Status: COMPLETED | OUTPATIENT
Start: 2022-01-27 | End: 2022-01-27

## 2022-01-27 RX ORDER — SODIUM CHLORIDE 9 MG/ML
500 INJECTION, SOLUTION INTRAVENOUS ONCE
Status: CANCELLED
Start: 2022-01-27 | End: 2022-01-27

## 2022-01-27 RX ADMIN — SODIUM CHLORIDE 500 ML: 9 INJECTION, SOLUTION INTRAVENOUS at 10:17:00

## 2022-01-27 NOTE — TELEPHONE ENCOUNTER
Called by radiologist, Dr. Manan Ford, reporting LLE venous doppler showed superficial thrombophlebitis involving the greater saphenous vein as well as extension into the popliteal vein. Will start Eliquis 5 mg twice daily for this DVT.       Alberto Cevallos MD  H

## 2022-01-27 NOTE — PROGRESS NOTES
Pt here for C5D1 Adriamycin, Cytoxan, Adcetris for T-cell lymphoma.   Arrives Via wheelchair, accompanied by Spouse           Pregnancy screening: Not applicable    Modifications in dose or schedule: No     Frequency of blood return and site check throughou

## 2022-01-27 NOTE — PROGRESS NOTES
Outpatient Oncology Care Plan  Problem list:  Knowledge deficit  Fatigue  Diarrhea   Constipation  Rash     Problems related to:    chemotherapy  disease/disease progression  side effect of treatment     Interventions:  provided general teaching     Expect

## 2022-01-27 NOTE — PROGRESS NOTES
Hematology/Oncology Clinic Follow Up Visit    Patient Name: Christel Vasquez  Medical Record Number: [id number]   YOB: 1940    PCP: Dr. Suman Richardson  Other providers: Dr. Colletta Halter, Dr. Sha Cortez, Dr. Errol Richardson    Reason for Con Clinic. The biopsy shows an infiltrate of T cells which stain as CD3 positive T cells and coexpress CD2, CD5 and variable CD30. CD4 and CD8 do not appear to be coexpressed and CD7 is not expressed.   Slightly increased numbers of background CD20 and PAX 5 intermixed with numerous histiocytes and scattered intermediate sized immunoblastic cells.  There are few intermixed eosinophils.   Immunoperoxidase stains were performed at [facility] in [place], [place] (block A1: CD3, CD4, CD7, CD8, CD20, CD21, CD30,  irregular nuclei, vesicular chromatin, and abundant pale cytoplasm in a polymorphous background of small lymphocytes, eosinophils, small vessels, and scattered immunoblasts.   The neoplastic cells liborio as CD3-positive T-cells that co-express CD2, CD5, CD27 maximum.  Subcentimeter lymph nodes in the right groin without any surgery seen the right groin SUV max 2.6.  -10/1/21- bmbx-mildly hypercellular bone marrow with active trilineage hematopoiesis. No definite evidence of lymphoma. No increased blasts.   No reduced brentuximab to 1.4mg/m2 d/t possible pneumonitis and fatigue)   -1/27/22- cycle 5 BV-CHP    ===============================  Interval events: Presents for follow-up. Last week came in with left thigh erythema and pain.  Was felt to have cellulitis a PROSTATECTOMY, RETROPUBIC RADICAL, W/NERVE SPARING  2008   • OTHER  08/31/2021    Left inguinal lymph node biopsy   • OTHER SURGICAL HISTORY  11/11/2020    VENTRAL INCISIONAL HERNIA REPAIR, PRIMARY, LEFT AXILLARY LYMPH NODE EXCISIONAL BIOPSY   • REMOVAL GA 0.1 % External Ointment, Apply 1 Application topically daily as needed.   , Disp: , Rfl:   acetaminophen 500 MG Oral Tab, Take 500 mg by mouth every 6 (six) hours as needed for Pain., Disp: , Rfl:   Fluticasone Furoate (ARNUITY ELLIPTA IN), Inhale 1 puff in 1804)  Do Not Use - Resp Rate: --  SpO2: 98 % (01/27 0909)    Wt Readings from Last 6 Encounters:  01/27/22 : 56.7 kg (125 lb)  01/21/22 : 54.3 kg (119 lb 9.6 oz)  01/13/22 : 60.2 kg (132 lb 12.8 oz)  01/06/22 : 56 kg (123 lb 8 oz)  12/30/21 : 53.2 kg (117 Component Value Date    INR 1.0 10/04/2021     Lab Results   Component Value Date     (H) 01/27/2022     01/06/2022     (H) 12/30/2021     (H) 12/16/2021     (H) 12/09/2021     11/18/2021     10/28/2021 development of new multifocal pneumonia. Cycle 4 was delayed for 4 weeks while he was treated with an therapeutic Bactrim for possible bacterial pneumonia versus PJP. Repeat CT showed improvement in opacities.  Have reduced brentuximab due to severe fatig tenderness  -started keflex 1/21 for empiric treatment of cellulitis last week,  and swollen but somewhat better. Concern for possible DVT or thrombophlebitis. -obtain stat LLE venous doppler to exclude thrombosis.   If found to have thrombos

## 2022-01-27 NOTE — PATIENT INSTRUCTIONS
On-body Injector for Neulasta Patient Instructions       Your On-Body Injection device was applied on this day:  ___________________ at this time ______________________    Your dose of medication will start on th

## 2022-01-28 ENCOUNTER — TELEPHONE (OUTPATIENT)
Dept: FAMILY MEDICINE | Age: 82
End: 2022-01-28

## 2022-01-31 ENCOUNTER — TELEPHONE (OUTPATIENT)
Dept: HEMATOLOGY/ONCOLOGY | Facility: HOSPITAL | Age: 82
End: 2022-01-31

## 2022-01-31 NOTE — TELEPHONE ENCOUNTER
Danitza Luciano called back was very confused over medication question he wants to know if he should refill cephalexin 500 MG Oral Cap. pilar

## 2022-01-31 NOTE — TELEPHONE ENCOUNTER
Spoke with pt. Take abx until he finishes, no refill needed. Pt did not have question about eliquis, that was a mistake.

## 2022-02-02 ENCOUNTER — TELEPHONE (OUTPATIENT)
Dept: HEMATOLOGY/ONCOLOGY | Facility: HOSPITAL | Age: 82
End: 2022-02-02

## 2022-02-02 NOTE — TELEPHONE ENCOUNTER
Dennise wife emilylovely garcia.   Has questions regarding medication   apixaban 5 MG Oral Tab    Please return call to Emily

## 2022-02-02 NOTE — TELEPHONE ENCOUNTER
Spoke with wife they are asking for more samples. States that it is costing them $500 and they cannot afford. Wife states the copay card office gave they do not qualify for because they have Medicare part D. Will leave sample at  to  tomorrow. Will let MD and THERESA know.

## 2022-02-03 ENCOUNTER — OFFICE VISIT (OUTPATIENT)
Dept: HEMATOLOGY/ONCOLOGY | Facility: HOSPITAL | Age: 82
End: 2022-02-03
Attending: INTERNAL MEDICINE
Payer: MEDICARE

## 2022-02-03 ENCOUNTER — SOCIAL WORK SERVICES (OUTPATIENT)
Dept: HEMATOLOGY/ONCOLOGY | Facility: HOSPITAL | Age: 82
End: 2022-02-03

## 2022-02-03 VITALS
HEART RATE: 87 BPM | TEMPERATURE: 99 F | SYSTOLIC BLOOD PRESSURE: 126 MMHG | WEIGHT: 119.38 LBS | RESPIRATION RATE: 16 BRPM | BODY MASS INDEX: 21.42 KG/M2 | HEIGHT: 62.6 IN | OXYGEN SATURATION: 97 % | DIASTOLIC BLOOD PRESSURE: 77 MMHG

## 2022-02-03 DIAGNOSIS — C84.48 PERIPHERAL T-CELL LYMPHOMA OF LYMPH NODES OF MULTIPLE REGIONS (HCC): Primary | ICD-10-CM

## 2022-02-03 DIAGNOSIS — R53.1 GENERALIZED WEAKNESS: ICD-10-CM

## 2022-02-03 DIAGNOSIS — I82.432 ACUTE DEEP VEIN THROMBOSIS (DVT) OF POPLITEAL VEIN OF LEFT LOWER EXTREMITY (HCC): ICD-10-CM

## 2022-02-03 DIAGNOSIS — R63.8 POOR FLUID INTAKE: ICD-10-CM

## 2022-02-03 DIAGNOSIS — D69.6 THROMBOCYTOPENIA (HCC): ICD-10-CM

## 2022-02-03 LAB
ANION GAP SERPL CALC-SCNC: 3 MMOL/L (ref 0–18)
BASOPHILS # BLD: 0.17 X10(3) UL (ref 0–0.2)
BASOPHILS NFR BLD: 1 %
BUN BLD-MCNC: 16 MG/DL (ref 7–18)
CALCIUM BLD-MCNC: 9.2 MG/DL (ref 8.5–10.1)
CHLORIDE SERPL-SCNC: 105 MMOL/L (ref 98–112)
CO2 SERPL-SCNC: 29 MMOL/L (ref 21–32)
CREAT BLD-MCNC: 0.82 MG/DL
EOSINOPHIL # BLD: 0.7 X10(3) UL (ref 0–0.7)
EOSINOPHIL NFR BLD: 4 %
ERYTHROCYTE [DISTWIDTH] IN BLOOD BY AUTOMATED COUNT: 15.9 %
GLUCOSE BLD-MCNC: 104 MG/DL (ref 70–99)
HCT VFR BLD AUTO: 37.5 %
HGB BLD-MCNC: 12.3 G/DL
LYMPHOCYTES NFR BLD: 1.04 X10(3) UL (ref 1–4)
LYMPHOCYTES NFR BLD: 6 %
MCH RBC QN AUTO: 32.8 PG (ref 26–34)
MCHC RBC AUTO-ENTMCNC: 32.8 G/DL (ref 31–37)
MCV RBC AUTO: 100 FL
MONOCYTES # BLD: 0.35 X10(3) UL (ref 0.1–1)
MONOCYTES NFR BLD: 2 %
MORPHOLOGY: NORMAL
NEUTROPHILS # BLD AUTO: 14.7 X10 (3) UL (ref 1.5–7.7)
NEUTROPHILS NFR BLD: 79 %
NEUTS BAND NFR BLD: 8 %
NEUTS HYPERSEG # BLD: 15.14 X10(3) UL (ref 1.5–7.7)
OSMOLALITY SERPL CALC.SUM OF ELEC: 285 MOSM/KG (ref 275–295)
PLATELET # BLD AUTO: 229 10(3)UL (ref 150–450)
PLATELET MORPHOLOGY: NORMAL
POTASSIUM SERPL-SCNC: 3.8 MMOL/L (ref 3.5–5.1)
RBC # BLD AUTO: 3.75 X10(6)UL
SODIUM SERPL-SCNC: 137 MMOL/L (ref 136–145)
TOTAL CELLS COUNTED BLD: 100
WBC # BLD AUTO: 17.4 X10(3) UL (ref 4–11)

## 2022-02-03 PROCEDURE — 99215 OFFICE O/P EST HI 40 MIN: CPT | Performed by: CLINICAL NURSE SPECIALIST

## 2022-02-03 PROCEDURE — 85027 COMPLETE CBC AUTOMATED: CPT

## 2022-02-03 PROCEDURE — 80048 BASIC METABOLIC PNL TOTAL CA: CPT

## 2022-02-03 PROCEDURE — 96360 HYDRATION IV INFUSION INIT: CPT

## 2022-02-03 PROCEDURE — 96361 HYDRATE IV INFUSION ADD-ON: CPT

## 2022-02-03 PROCEDURE — 85007 BL SMEAR W/DIFF WBC COUNT: CPT

## 2022-02-03 PROCEDURE — 85025 COMPLETE CBC W/AUTO DIFF WBC: CPT

## 2022-02-03 NOTE — PROGRESS NOTES
Patient presents with: Follow - Up: APN assessment     Pt is here for lab follow up; he was last treated on 01/27/2022 C5 D1 BV-CHP. Pt reports to feeling dizzy today when standing and some concentration issues. Continued reduced appetite - does not finish meals; has not had much to drink today. Denies N,V,D,C, or pain. Swelling in leg has reduced; still using mouth wash.     Education Record    Learner:  Patient and Spouse    Disease / Diagnosis: peripheral T-Cell lymphoma    Barriers / Limitations:  None   Comments:    Method:  Brief focused   Comments:    General Topics:  Diet, Medication, Pain, Side effects and symptom management and Plan of care reviewed   Comments:    Outcome:  Shows understanding   Comments:

## 2022-02-03 NOTE — PROGRESS NOTES
met with patient and Wife, providing education and application for [facility]. Family to complete their portion and bring in with income proof and prescription OOP costs.  to remain available as needed.

## 2022-02-03 NOTE — PROGRESS NOTES
Education Record    Learner:  Patient    Disease / Diagnosis: IVF - tolerated without issue. Will return in 1 week for labs, IVF. Next chemo 2/17. AVS printed and provided.      Barriers / Limitations:  None   Comments:    Method:  Brief focused and Discussion   Comments:    General Topics:  Plan of care reviewed   Comments:    Outcome:  Shows understanding   Comments:

## 2022-02-10 ENCOUNTER — OFFICE VISIT (OUTPATIENT)
Dept: HEMATOLOGY/ONCOLOGY | Facility: HOSPITAL | Age: 82
End: 2022-02-10
Attending: INTERNAL MEDICINE
Payer: MEDICARE

## 2022-02-10 VITALS
OXYGEN SATURATION: 96 % | HEART RATE: 86 BPM | RESPIRATION RATE: 18 BRPM | SYSTOLIC BLOOD PRESSURE: 110 MMHG | DIASTOLIC BLOOD PRESSURE: 72 MMHG | TEMPERATURE: 97 F

## 2022-02-10 DIAGNOSIS — C84.48 PERIPHERAL T-CELL LYMPHOMA OF LYMPH NODES OF MULTIPLE REGIONS (HCC): Primary | ICD-10-CM

## 2022-02-10 DIAGNOSIS — R63.8 POOR FLUID INTAKE: ICD-10-CM

## 2022-02-10 DIAGNOSIS — D69.6 THROMBOCYTOPENIA (HCC): ICD-10-CM

## 2022-02-10 LAB
ANION GAP SERPL CALC-SCNC: 6 MMOL/L (ref 0–18)
BASOPHILS # BLD AUTO: 0.12 X10(3) UL (ref 0–0.2)
BASOPHILS NFR BLD AUTO: 1.1 %
BUN BLD-MCNC: 9 MG/DL (ref 7–18)
CALCIUM BLD-MCNC: 9.2 MG/DL (ref 8.5–10.1)
CHLORIDE SERPL-SCNC: 109 MMOL/L (ref 98–112)
CO2 SERPL-SCNC: 26 MMOL/L (ref 21–32)
CREAT BLD-MCNC: 1.02 MG/DL
EOSINOPHIL # BLD AUTO: 0.09 X10(3) UL (ref 0–0.7)
EOSINOPHIL NFR BLD AUTO: 0.8 %
ERYTHROCYTE [DISTWIDTH] IN BLOOD BY AUTOMATED COUNT: 16.6 %
FASTING STATUS PATIENT QL REPORTED: NO
GLUCOSE BLD-MCNC: 135 MG/DL (ref 70–99)
HCT VFR BLD AUTO: 37.9 %
HGB BLD-MCNC: 12.5 G/DL
IMM GRANULOCYTES # BLD AUTO: 0.15 X10(3) UL (ref 0–1)
IMM GRANULOCYTES NFR BLD: 1.3 %
LYMPHOCYTES # BLD AUTO: 0.85 X10(3) UL (ref 1–4)
LYMPHOCYTES NFR BLD AUTO: 7.6 %
MCH RBC QN AUTO: 33.2 PG (ref 26–34)
MCHC RBC AUTO-ENTMCNC: 33 G/DL (ref 31–37)
MCV RBC AUTO: 100.5 FL
MONOCYTES # BLD AUTO: 0.62 X10(3) UL (ref 0.1–1)
MONOCYTES NFR BLD AUTO: 5.6 %
NEUTROPHILS # BLD AUTO: 9.34 X10 (3) UL (ref 1.5–7.7)
NEUTROPHILS # BLD AUTO: 9.34 X10(3) UL (ref 1.5–7.7)
NEUTROPHILS NFR BLD AUTO: 83.6 %
OSMOLALITY SERPL CALC.SUM OF ELEC: 293 MOSM/KG (ref 275–295)
PLATELET # BLD AUTO: 233 10(3)UL (ref 150–450)
POTASSIUM SERPL-SCNC: 3.8 MMOL/L (ref 3.5–5.1)
RBC # BLD AUTO: 3.77 X10(6)UL
SODIUM SERPL-SCNC: 141 MMOL/L (ref 136–145)
WBC # BLD AUTO: 11.2 X10(3) UL (ref 4–11)

## 2022-02-10 PROCEDURE — 80048 BASIC METABOLIC PNL TOTAL CA: CPT

## 2022-02-10 PROCEDURE — 85025 COMPLETE CBC W/AUTO DIFF WBC: CPT

## 2022-02-10 PROCEDURE — 96360 HYDRATION IV INFUSION INIT: CPT

## 2022-02-10 RX ORDER — SULFAMETHOXAZOLE AND TRIMETHOPRIM 800; 160 MG/1; MG/1
1 TABLET ORAL
Qty: 15 TABLET | Refills: 3 | Status: SHIPPED | OUTPATIENT
Start: 2022-02-11

## 2022-02-10 NOTE — PROGRESS NOTES
Education Record    Learner:  Patient    Disease / Diagnosis: dehydration: NS over 90 min     Barriers / Limitations:  None   Comments:    Method:  Brief focused and Discussion   Comments:    General Topics:  Side effects and symptom management   Comments:    Outcome:  Shows understanding   Comments:

## 2022-02-11 RX ORDER — SULFAMETHOXAZOLE AND TRIMETHOPRIM 800; 160 MG/1; MG/1
TABLET ORAL
Qty: 126 TABLET | Refills: 0 | OUTPATIENT
Start: 2022-02-11

## 2022-02-17 ENCOUNTER — OFFICE VISIT (OUTPATIENT)
Dept: HEMATOLOGY/ONCOLOGY | Facility: HOSPITAL | Age: 82
End: 2022-02-17
Attending: INTERNAL MEDICINE
Payer: MEDICARE

## 2022-02-17 VITALS
TEMPERATURE: 98 F | OXYGEN SATURATION: 97 % | WEIGHT: 121.38 LBS | DIASTOLIC BLOOD PRESSURE: 77 MMHG | BODY MASS INDEX: 21.78 KG/M2 | HEIGHT: 62.6 IN | SYSTOLIC BLOOD PRESSURE: 121 MMHG | HEART RATE: 88 BPM | RESPIRATION RATE: 16 BRPM

## 2022-02-17 DIAGNOSIS — C84.48 PERIPHERAL T-CELL LYMPHOMA OF LYMPH NODES OF MULTIPLE REGIONS (HCC): Primary | ICD-10-CM

## 2022-02-17 DIAGNOSIS — R63.0 POOR APPETITE: ICD-10-CM

## 2022-02-17 DIAGNOSIS — K59.01 SLOW TRANSIT CONSTIPATION: ICD-10-CM

## 2022-02-17 DIAGNOSIS — I82.432 ACUTE DEEP VEIN THROMBOSIS (DVT) OF POPLITEAL VEIN OF LEFT LOWER EXTREMITY (HCC): ICD-10-CM

## 2022-02-17 DIAGNOSIS — D69.6 THROMBOCYTOPENIA (HCC): ICD-10-CM

## 2022-02-17 DIAGNOSIS — D84.9 IMMUNOCOMPROMISED (HCC): ICD-10-CM

## 2022-02-17 LAB
ALBUMIN SERPL-MCNC: 3.2 G/DL (ref 3.4–5)
ALBUMIN/GLOB SERPL: 1.1 {RATIO} (ref 1–2)
ALP LIVER SERPL-CCNC: 109 U/L
ALT SERPL-CCNC: 19 U/L
ANION GAP SERPL CALC-SCNC: 2 MMOL/L (ref 0–18)
AST SERPL-CCNC: 16 U/L (ref 15–37)
BASOPHILS NFR BLD AUTO: 1.5 %
BILIRUB SERPL-MCNC: 0.4 MG/DL (ref 0.1–2)
BUN BLD-MCNC: 16 MG/DL (ref 7–18)
CALCIUM BLD-MCNC: 8.8 MG/DL (ref 8.5–10.1)
CHLORIDE SERPL-SCNC: 110 MMOL/L (ref 98–112)
CO2 SERPL-SCNC: 28 MMOL/L (ref 21–32)
CREAT BLD-MCNC: 0.84 MG/DL
EOSINOPHIL # BLD AUTO: 0.06 X10(3) UL (ref 0–0.7)
EOSINOPHIL NFR BLD AUTO: 0.7 %
FASTING STATUS PATIENT QL REPORTED: NO
GLOBULIN PLAS-MCNC: 2.9 G/DL (ref 2.8–4.4)
GLUCOSE BLD-MCNC: 82 MG/DL (ref 70–99)
HCT VFR BLD AUTO: 37.2 %
HGB BLD-MCNC: 12.8 G/DL
IMM GRANULOCYTES # BLD AUTO: 0.1 X10(3) UL (ref 0–1)
IMM GRANULOCYTES NFR BLD: 1.1 %
LDH SERPL L TO P-CCNC: 191 U/L
LYMPHOCYTES # BLD AUTO: 0.95 X10(3) UL (ref 1–4)
LYMPHOCYTES NFR BLD AUTO: 10.5 %
MCH RBC QN AUTO: 34.4 PG (ref 26–34)
MCHC RBC AUTO-ENTMCNC: 34.4 G/DL (ref 31–37)
MCV RBC AUTO: 100 FL
MONOCYTES # BLD AUTO: 0.66 X10(3) UL (ref 0.1–1)
MONOCYTES NFR BLD AUTO: 7.3 %
NEUTROPHILS # BLD AUTO: 7.18 X10 (3) UL (ref 1.5–7.7)
NEUTROPHILS # BLD AUTO: 7.18 X10(3) UL (ref 1.5–7.7)
NEUTROPHILS NFR BLD AUTO: 78.9 %
OSMOLALITY SERPL CALC.SUM OF ELEC: 290 MOSM/KG (ref 275–295)
PLATELET # BLD AUTO: 225 10(3)UL (ref 150–450)
POTASSIUM SERPL-SCNC: 3.8 MMOL/L (ref 3.5–5.1)
PROT SERPL-MCNC: 6.1 G/DL (ref 6.4–8.2)
RBC # BLD AUTO: 3.72 X10(6)UL
SODIUM SERPL-SCNC: 140 MMOL/L (ref 136–145)
URATE SERPL-MCNC: 3.1 MG/DL
WBC # BLD AUTO: 9.1 X10(3) UL (ref 4–11)

## 2022-02-17 PROCEDURE — 96377 APPLICATON ON-BODY INJECTOR: CPT

## 2022-02-17 PROCEDURE — 96413 CHEMO IV INFUSION 1 HR: CPT

## 2022-02-17 PROCEDURE — 83615 LACTATE (LD) (LDH) ENZYME: CPT

## 2022-02-17 PROCEDURE — 96361 HYDRATE IV INFUSION ADD-ON: CPT

## 2022-02-17 PROCEDURE — 85025 COMPLETE CBC W/AUTO DIFF WBC: CPT

## 2022-02-17 PROCEDURE — 96411 CHEMO IV PUSH ADDL DRUG: CPT

## 2022-02-17 PROCEDURE — 96375 TX/PRO/DX INJ NEW DRUG ADDON: CPT

## 2022-02-17 PROCEDURE — 80053 COMPREHEN METABOLIC PANEL: CPT

## 2022-02-17 PROCEDURE — 96417 CHEMO IV INFUS EACH ADDL SEQ: CPT

## 2022-02-17 PROCEDURE — 99215 OFFICE O/P EST HI 40 MIN: CPT | Performed by: INTERNAL MEDICINE

## 2022-02-17 PROCEDURE — 84550 ASSAY OF BLOOD/URIC ACID: CPT

## 2022-02-17 NOTE — PROGRESS NOTES
Pt here for C6,D1 jacobo/cytoxan/brentuximab. Arrives Via wheelchair, accompanied by Spouse           Pregnancy screening: Not applicable    Modifications in dose or schedule: No     Frequency of blood return and site check throughout administration: Prior to administration, Prior to each drug, Every 2-3 ml IVP and At completion of therapy   Discharged to Home, Via wheelchair, accompanied by:Spouse    Outpatient Oncology Care Plan  Problem list:  fatigue  knowledge deficit  Problems related to:    chemotherapy  side effect of treatment  Interventions:  chemotherapy teaching  monitor lab values  provided general teaching  Expected outcomes:  patient supported/coping well  safe in environment  symptoms relieved/minimized  understands plan of care  Progress towards outcome:  making progress    Education Record    Learner:  Spouse  Barriers / Limitations:  None  Method:  Brief focused and Discussion  Outcome:  Shows understanding      Comments: will return next Friday for labs, fluids.

## 2022-02-17 NOTE — PATIENT INSTRUCTIONS
On-body Injector for Neulasta Patient Instructions       Your On-Body Injection device was applied on this day:  ________________ at this time ____________________    Your dose of medication will start on this day: ______________________ at this time _______________________    Safe time to remove this device will be on this day: __________________________ at this time ________________________       Important information to remember about the Neulasta Injector:     1. The On-Body Neulasta Injector begins to deliver the dose of Neulasta 27 hours after it is activated at the cancer center. Beeping at that time indicates  that the dose will be delivered in 2 minutes. It takes 45 minutes for the dose to  be completely delivered. DO NOT REMOVE during this time. 2. Check the light periodically for a slow flashing GREEN light, this is normal.     3. If the light is flashing RED or comes off prior to the delivery time, during regular business hours, please call [phone] and ask for the charge nurse. If this is  after hours or a holiday, please contact the [facility] @ [phone], a support person is available 24/7.      4. Please keep the device at least 4 inches away from electrical equipment, such as CELL PHONES, microwaves, cordless phones. Do NOT have Xrays, MRI,  CT without informing the technician. 5. If you are traveling, needing to go through airport security, please notify the TSA. You may still travel, just avoid the xray security. 6. Avoid bumping or sleeping directly on the injector. 7. Avoid hot tubs, saunas and direct sunlight. Keep the injector dry 3 hours prior to the dose delivery time. 8. Remove the injector device at the directed time above and place in a hard container for disposal and place in trash.

## 2022-02-24 ENCOUNTER — APPOINTMENT (OUTPATIENT)
Dept: HEMATOLOGY/ONCOLOGY | Facility: HOSPITAL | Age: 82
End: 2022-02-24
Attending: INTERNAL MEDICINE
Payer: MEDICARE

## 2022-02-25 ENCOUNTER — OFFICE VISIT (OUTPATIENT)
Dept: HEMATOLOGY/ONCOLOGY | Facility: HOSPITAL | Age: 82
End: 2022-02-25
Attending: INTERNAL MEDICINE
Payer: MEDICARE

## 2022-02-25 VITALS
HEART RATE: 77 BPM | DIASTOLIC BLOOD PRESSURE: 75 MMHG | SYSTOLIC BLOOD PRESSURE: 142 MMHG | RESPIRATION RATE: 18 BRPM | TEMPERATURE: 99 F | OXYGEN SATURATION: 97 %

## 2022-02-25 DIAGNOSIS — C84.48 PERIPHERAL T-CELL LYMPHOMA OF LYMPH NODES OF MULTIPLE REGIONS (HCC): ICD-10-CM

## 2022-02-25 DIAGNOSIS — D84.9 IMMUNOCOMPROMISED (HCC): Primary | ICD-10-CM

## 2022-02-25 LAB
ANION GAP SERPL CALC-SCNC: 5 MMOL/L (ref 0–18)
BASOPHILS # BLD: 0 X10(3) UL (ref 0–0.2)
BASOPHILS NFR BLD: 0 %
BUN BLD-MCNC: 15 MG/DL (ref 7–18)
CALCIUM BLD-MCNC: 9.2 MG/DL (ref 8.5–10.1)
CHLORIDE SERPL-SCNC: 107 MMOL/L (ref 98–112)
CO2 SERPL-SCNC: 29 MMOL/L (ref 21–32)
CREAT BLD-MCNC: 0.96 MG/DL
DOHLE BOD BLD QL SMEAR: PRESENT
EOSINOPHIL # BLD: 0 X10(3) UL (ref 0–0.7)
EOSINOPHIL NFR BLD: 0 %
ERYTHROCYTE [DISTWIDTH] IN BLOOD BY AUTOMATED COUNT: 15.9 %
FASTING STATUS PATIENT QL REPORTED: NO
GLUCOSE BLD-MCNC: 114 MG/DL (ref 70–99)
HCT VFR BLD AUTO: 41.1 %
HGB BLD-MCNC: 13.3 G/DL
LYMPHOCYTES NFR BLD: 1.16 X10(3) UL (ref 1–4)
LYMPHOCYTES NFR BLD: 6 %
MCH RBC QN AUTO: 32.9 PG (ref 26–34)
MCHC RBC AUTO-ENTMCNC: 32.4 G/DL (ref 31–37)
MCV RBC AUTO: 101.7 FL
MONOCYTES # BLD: 0.97 X10(3) UL (ref 0.1–1)
MONOCYTES NFR BLD: 5 %
MORPHOLOGY: NORMAL
NEUTROPHILS # BLD AUTO: 16.5 X10 (3) UL (ref 1.5–7.7)
NEUTROPHILS NFR BLD: 87 %
NEUTS BAND NFR BLD: 2 %
NEUTS HYPERSEG # BLD: 17.18 X10(3) UL (ref 1.5–7.7)
OSMOLALITY SERPL CALC.SUM OF ELEC: 294 MOSM/KG (ref 275–295)
PLATELET # BLD AUTO: 204 10(3)UL (ref 150–450)
PLATELET MORPHOLOGY: NORMAL
RBC # BLD AUTO: 4.04 X10(6)UL
SODIUM SERPL-SCNC: 141 MMOL/L (ref 136–145)
TOTAL CELLS COUNTED BLD: 100
WBC # BLD AUTO: 19.3 X10(3) UL (ref 4–11)

## 2022-02-25 PROCEDURE — 85025 COMPLETE CBC W/AUTO DIFF WBC: CPT

## 2022-02-25 PROCEDURE — 85027 COMPLETE CBC AUTOMATED: CPT

## 2022-02-25 PROCEDURE — 85007 BL SMEAR W/DIFF WBC COUNT: CPT

## 2022-02-25 PROCEDURE — 80048 BASIC METABOLIC PNL TOTAL CA: CPT

## 2022-02-25 PROCEDURE — 96360 HYDRATION IV INFUSION INIT: CPT

## 2022-02-25 NOTE — PROGRESS NOTES
Education Record    Learner:  Patient and Spouse    Disease / Diagnosis: Lymphoma    Barriers / Limitations:  None   Comments:    Method:  Brief focused   Comments:    General Topics:  Plan of care reviewed   Comments:    Outcome:  Shows understanding   Comments:

## 2022-03-02 ENCOUNTER — DOCUMENTATION ONLY (OUTPATIENT)
Dept: HEMATOLOGY/ONCOLOGY | Facility: HOSPITAL | Age: 82
End: 2022-03-02

## 2022-03-02 NOTE — PROGRESS NOTES
Nutrition screen complete as triggered by [employer] unplanned wt change. Chart reviewed. Pt appears nutritionally stable at present. RD available on consult.

## 2022-03-03 ENCOUNTER — APPOINTMENT (OUTPATIENT)
Dept: HEMATOLOGY/ONCOLOGY | Facility: HOSPITAL | Age: 82
End: 2022-03-03
Attending: INTERNAL MEDICINE
Payer: MEDICARE

## 2022-03-04 ENCOUNTER — OFFICE VISIT (OUTPATIENT)
Dept: HEMATOLOGY/ONCOLOGY | Facility: HOSPITAL | Age: 82
End: 2022-03-04
Attending: INTERNAL MEDICINE
Payer: MEDICARE

## 2022-03-04 VITALS
DIASTOLIC BLOOD PRESSURE: 79 MMHG | WEIGHT: 122.38 LBS | BODY MASS INDEX: 21.96 KG/M2 | RESPIRATION RATE: 18 BRPM | TEMPERATURE: 99 F | HEIGHT: 62.6 IN | OXYGEN SATURATION: 96 % | HEART RATE: 76 BPM | SYSTOLIC BLOOD PRESSURE: 137 MMHG

## 2022-03-04 DIAGNOSIS — D84.9 IMMUNOCOMPROMISED (HCC): ICD-10-CM

## 2022-03-04 DIAGNOSIS — C84.48 PERIPHERAL T-CELL LYMPHOMA OF LYMPH NODES OF MULTIPLE REGIONS (HCC): Primary | ICD-10-CM

## 2022-03-04 LAB
ANION GAP SERPL CALC-SCNC: 7 MMOL/L (ref 0–18)
BASOPHILS # BLD AUTO: 0.14 X10(3) UL (ref 0–0.2)
BASOPHILS NFR BLD AUTO: 1 %
BUN BLD-MCNC: 12 MG/DL (ref 7–18)
CALCIUM BLD-MCNC: 8.9 MG/DL (ref 8.5–10.1)
CO2 SERPL-SCNC: 27 MMOL/L (ref 21–32)
CREAT BLD-MCNC: 0.79 MG/DL
EOSINOPHIL # BLD AUTO: 0.13 X10(3) UL (ref 0–0.7)
EOSINOPHIL NFR BLD AUTO: 1 %
ERYTHROCYTE [DISTWIDTH] IN BLOOD BY AUTOMATED COUNT: 16.6 %
FASTING STATUS PATIENT QL REPORTED: NO
GLUCOSE BLD-MCNC: 77 MG/DL (ref 70–99)
HCT VFR BLD AUTO: 37.2 %
IMM GRANULOCYTES # BLD AUTO: 0.16 X10(3) UL (ref 0–1)
IMM GRANULOCYTES NFR BLD: 1.2 %
LYMPHOCYTES # BLD AUTO: 0.76 X10(3) UL (ref 1–4)
LYMPHOCYTES NFR BLD AUTO: 5.6 %
MCH RBC QN AUTO: 34.4 PG (ref 26–34)
MCHC RBC AUTO-ENTMCNC: 33.9 G/DL (ref 31–37)
MCV RBC AUTO: 101.6 FL
MONOCYTES # BLD AUTO: 0.85 X10(3) UL (ref 0.1–1)
MONOCYTES NFR BLD AUTO: 6.3 %
NEUTROPHILS # BLD AUTO: 11.46 X10 (3) UL (ref 1.5–7.7)
NEUTROPHILS # BLD AUTO: 11.46 X10(3) UL (ref 1.5–7.7)
NEUTROPHILS NFR BLD AUTO: 84.9 %
OSMOLALITY SERPL CALC.SUM OF ELEC: 293 MOSM/KG (ref 275–295)
PLATELET # BLD AUTO: 202 10(3)UL (ref 150–450)
POTASSIUM SERPL-SCNC: 3.8 MMOL/L (ref 3.5–5.1)
RBC # BLD AUTO: 3.66 X10(6)UL
SODIUM SERPL-SCNC: 142 MMOL/L (ref 136–145)
WBC # BLD AUTO: 13.5 X10(3) UL (ref 4–11)

## 2022-03-04 PROCEDURE — 85025 COMPLETE CBC W/AUTO DIFF WBC: CPT

## 2022-03-04 PROCEDURE — 96360 HYDRATION IV INFUSION INIT: CPT

## 2022-03-04 PROCEDURE — 80048 BASIC METABOLIC PNL TOTAL CA: CPT

## 2022-03-04 NOTE — PROGRESS NOTES
Education Record    Learner:  Patient    Disease / Diagnosis:IV fluids    Barriers / Limitations:  None   Comments:    Method:  Brief focused   Comments:    General Topics:  Infection, Medication, Pain, Precautions, Procedure, Side effects and symptom management, Plan of care reviewed and Fall risk and prevention   Comments:    Outcome:  Shows understanding   Comments:

## 2022-03-09 ENCOUNTER — HOSPITAL ENCOUNTER (OUTPATIENT)
Dept: NUCLEAR MEDICINE | Facility: HOSPITAL | Age: 82
Discharge: HOME | End: 2022-03-09
Attending: INTERNAL MEDICINE
Payer: MEDICARE

## 2022-03-09 DIAGNOSIS — C84.48 PERIPHERAL T-CELL LYMPHOMA OF LYMPH NODES OF MULTIPLE REGIONS (HCC): ICD-10-CM

## 2022-03-09 LAB — GLUCOSE BLD-MCNC: 102 MG/DL (ref 70–99)

## 2022-03-09 PROCEDURE — 78815 PET IMAGE W/CT SKULL-THIGH: CPT | Performed by: INTERNAL MEDICINE

## 2022-03-09 PROCEDURE — 82962 GLUCOSE BLOOD TEST: CPT

## 2022-03-10 ENCOUNTER — OFFICE VISIT (OUTPATIENT)
Dept: HEMATOLOGY/ONCOLOGY | Facility: HOSPITAL | Age: 82
End: 2022-03-10
Attending: INTERNAL MEDICINE
Payer: MEDICARE

## 2022-03-10 VITALS
DIASTOLIC BLOOD PRESSURE: 75 MMHG | HEIGHT: 62.6 IN | TEMPERATURE: 98 F | SYSTOLIC BLOOD PRESSURE: 127 MMHG | RESPIRATION RATE: 18 BRPM | BODY MASS INDEX: 21.78 KG/M2 | WEIGHT: 121.38 LBS | OXYGEN SATURATION: 97 % | HEART RATE: 82 BPM

## 2022-03-10 DIAGNOSIS — C84.48 PERIPHERAL T-CELL LYMPHOMA OF LYMPH NODES OF MULTIPLE REGIONS (HCC): Primary | ICD-10-CM

## 2022-03-10 DIAGNOSIS — I82.432 ACUTE DEEP VEIN THROMBOSIS (DVT) OF POPLITEAL VEIN OF LEFT LOWER EXTREMITY (HCC): ICD-10-CM

## 2022-03-10 DIAGNOSIS — R53.83 FATIGUE DUE TO TREATMENT: ICD-10-CM

## 2022-03-10 DIAGNOSIS — R63.0 POOR APPETITE: ICD-10-CM

## 2022-03-10 DIAGNOSIS — D72.810 LYMPHOPENIA: ICD-10-CM

## 2022-03-10 DIAGNOSIS — C84.48 PERIPHERAL T-CELL LYMPHOMA OF LYMPH NODES OF MULTIPLE REGIONS (HCC): ICD-10-CM

## 2022-03-10 DIAGNOSIS — M79.89 LEFT LEG SWELLING: ICD-10-CM

## 2022-03-10 DIAGNOSIS — K59.01 SLOW TRANSIT CONSTIPATION: ICD-10-CM

## 2022-03-10 LAB
ALBUMIN SERPL-MCNC: 3.4 G/DL (ref 3.4–5)
ALBUMIN/GLOB SERPL: 1.3 {RATIO} (ref 1–2)
ALP LIVER SERPL-CCNC: 112 U/L
ALT SERPL-CCNC: 19 U/L
ANION GAP SERPL CALC-SCNC: 3 MMOL/L (ref 0–18)
AST SERPL-CCNC: 15 U/L (ref 15–37)
BASOPHILS # BLD AUTO: 0.14 X10(3) UL (ref 0–0.2)
BASOPHILS NFR BLD AUTO: 1.7 %
BILIRUB SERPL-MCNC: 0.6 MG/DL (ref 0.1–2)
BUN BLD-MCNC: 14 MG/DL (ref 7–18)
CALCIUM BLD-MCNC: 9.2 MG/DL (ref 8.5–10.1)
CHLORIDE SERPL-SCNC: 110 MMOL/L (ref 98–112)
CREAT BLD-MCNC: 0.81 MG/DL
EOSINOPHIL # BLD AUTO: 0.06 X10(3) UL (ref 0–0.7)
EOSINOPHIL NFR BLD AUTO: 0.7 %
ERYTHROCYTE [DISTWIDTH] IN BLOOD BY AUTOMATED COUNT: 16 %
FASTING STATUS PATIENT QL REPORTED: NO
GLOBULIN PLAS-MCNC: 2.7 G/DL (ref 2.8–4.4)
GLUCOSE BLD-MCNC: 112 MG/DL (ref 70–99)
HCT VFR BLD AUTO: 38.2 %
HGB BLD-MCNC: 13.1 G/DL
IMM GRANULOCYTES # BLD AUTO: 0.05 X10(3) UL (ref 0–1)
IMM GRANULOCYTES NFR BLD: 0.6 %
LDH SERPL L TO P-CCNC: 195 U/L
LYMPHOCYTES # BLD AUTO: 0.65 X10(3) UL (ref 1–4)
LYMPHOCYTES NFR BLD AUTO: 8 %
MCH RBC QN AUTO: 34.7 PG (ref 26–34)
MCHC RBC AUTO-ENTMCNC: 34.3 G/DL (ref 31–37)
MONOCYTES # BLD AUTO: 0.52 X10(3) UL (ref 0.1–1)
MONOCYTES NFR BLD AUTO: 6.4 %
NEUTROPHILS # BLD AUTO: 6.7 X10 (3) UL (ref 1.5–7.7)
NEUTROPHILS # BLD AUTO: 6.7 X10(3) UL (ref 1.5–7.7)
NEUTROPHILS NFR BLD AUTO: 82.6 %
OSMOLALITY SERPL CALC.SUM OF ELEC: 293 MOSM/KG (ref 275–295)
PLATELET # BLD AUTO: 227 10(3)UL (ref 150–450)
POTASSIUM SERPL-SCNC: 3.5 MMOL/L (ref 3.5–5.1)
PROT SERPL-MCNC: 6.1 G/DL (ref 6.4–8.2)
RBC # BLD AUTO: 3.78 X10(6)UL
SODIUM SERPL-SCNC: 141 MMOL/L (ref 136–145)
WBC # BLD AUTO: 8.1 X10(3) UL (ref 4–11)

## 2022-03-10 PROCEDURE — 85025 COMPLETE CBC W/AUTO DIFF WBC: CPT

## 2022-03-10 PROCEDURE — 36591 DRAW BLOOD OFF VENOUS DEVICE: CPT

## 2022-03-10 PROCEDURE — 99215 OFFICE O/P EST HI 40 MIN: CPT | Performed by: INTERNAL MEDICINE

## 2022-03-10 PROCEDURE — 80053 COMPREHEN METABOLIC PANEL: CPT

## 2022-03-10 PROCEDURE — 83615 LACTATE (LD) (LDH) ENZYME: CPT

## 2022-03-21 RX ORDER — AMLODIPINE BESYLATE 5 MG/1
5 TABLET ORAL DAILY
Qty: 90 TABLET | Refills: 0 | Status: SHIPPED | OUTPATIENT
Start: 2022-03-21

## 2022-03-21 RX ORDER — AMLODIPINE BESYLATE 5 MG/1
TABLET ORAL
Qty: 90 TABLET | Refills: 0 | Status: SHIPPED | OUTPATIENT
Start: 2022-03-21

## 2022-03-21 NOTE — TELEPHONE ENCOUNTER
Hypertension Medications Protocol Failed 03/21/2022 12:19 PM   Protocol Details  Appointment in past 6 or next 3 months    CMP or BMP in past 12 months    Last serum creatinine< 2.0        Last office visit:  01/21/21  Last refill:  11/22/21  #90, no refills  Last cmp:  03/10/22  BP Readings from Last 3 Encounters:  03/10/22 : 127/75  03/04/22 : 137/79  02/25/22 : 142/75    No future office visits with pcp. amLODIPine 5 MG Oral Tab  - [facility] [place] [facility] - Pt states it is hard for him to come in to see dr because he doesn't have a car. He moved & hasn't found a new dr yet.

## 2022-03-21 NOTE — TELEPHONE ENCOUNTER
amLODIPine 5 MG Oral Tab  - [facility] [place] Flax - Pt states it is hard for him to come in to see dr because he doesn't have a car. He moved & hasn't found a new dr yet.

## 2022-03-22 RX ORDER — AMLODIPINE BESYLATE 5 MG/1
TABLET ORAL
Qty: 90 TABLET | Refills: 0 | OUTPATIENT
Start: 2022-03-22

## 2022-03-29 ENCOUNTER — TELEPHONE (OUTPATIENT)
Dept: HEMATOLOGY/ONCOLOGY | Facility: HOSPITAL | Age: 82
End: 2022-03-29

## 2022-03-29 NOTE — TELEPHONE ENCOUNTER
LVM for daughter. Patient is on Eliquis, not Xarelto. Samples provided. Contact information provided if patient or daughter has additional questions or concerns.

## 2022-03-29 NOTE — TELEPHONE ENCOUNTER
Patient is supposed to take a test to determine if he still needs to be on Xarelto. Robert Rosastri his daughter called to see if he can get samples of Xarelto instead paying a $500 deductible for a full prescription? Please call Robert Todd at [phone] to discuss. You can leave a detailed message on her voicemail f she does not answer. Thank you.

## 2022-04-08 ENCOUNTER — OFFICE VISIT (OUTPATIENT)
Dept: FAMILY MEDICINE CLINIC | Facility: CLINIC | Age: 82
End: 2022-04-08
Payer: MEDICARE

## 2022-04-08 VITALS
TEMPERATURE: 98 F | OXYGEN SATURATION: 97 % | HEIGHT: 62 IN | BODY MASS INDEX: 22.11 KG/M2 | HEART RATE: 76 BPM | DIASTOLIC BLOOD PRESSURE: 70 MMHG | WEIGHT: 120.13 LBS | SYSTOLIC BLOOD PRESSURE: 118 MMHG

## 2022-04-08 DIAGNOSIS — Z90.79 S/P PROSTATECTOMY: ICD-10-CM

## 2022-04-08 DIAGNOSIS — I82.402 DEEP VEIN THROMBOSIS (DVT) OF LEFT LOWER EXTREMITY, UNSPECIFIED CHRONICITY, UNSPECIFIED VEIN (HCC): ICD-10-CM

## 2022-04-08 DIAGNOSIS — C84.48 PERIPHERAL T CELL LYMPHOMA OF LYMPH NODES OF MULTIPLE SITES (HCC): ICD-10-CM

## 2022-04-08 DIAGNOSIS — Z00.00 ENCOUNTER FOR ANNUAL HEALTH EXAMINATION: Primary | ICD-10-CM

## 2022-04-08 DIAGNOSIS — Z90.49 HISTORY OF CHOLECYSTECTOMY: ICD-10-CM

## 2022-04-08 DIAGNOSIS — Z13.31 DEPRESSION SCREENING: ICD-10-CM

## 2022-04-08 DIAGNOSIS — K59.01 SLOW TRANSIT CONSTIPATION: ICD-10-CM

## 2022-04-08 DIAGNOSIS — I10 ESSENTIAL HYPERTENSION: ICD-10-CM

## 2022-04-08 DIAGNOSIS — D84.9 IMMUNOCOMPROMISED (HCC): ICD-10-CM

## 2022-04-08 DIAGNOSIS — E44.1 MILD PROTEIN-CALORIE MALNUTRITION (HCC): ICD-10-CM

## 2022-04-08 DIAGNOSIS — D72.810 LYMPHOCYTOPENIA: ICD-10-CM

## 2022-04-08 DIAGNOSIS — C61 PROSTATE CANCER (HCC): ICD-10-CM

## 2022-04-08 PROCEDURE — G0444 DEPRESSION SCREEN ANNUAL: HCPCS | Performed by: FAMILY MEDICINE

## 2022-04-08 PROCEDURE — G0439 PPPS, SUBSEQ VISIT: HCPCS | Performed by: FAMILY MEDICINE

## 2022-04-19 RX ORDER — LEVOTHYROXINE SODIUM 50 UG/1
TABLET ORAL
Qty: 90 TABLET | Refills: 0 | OUTPATIENT
Start: 2022-04-19

## 2022-04-20 RX ORDER — ESCITALOPRAM OXALATE 5 MG/1
5 TABLET ORAL DAILY
Qty: 30 TABLET | Refills: 0 | Status: SHIPPED | OUTPATIENT
Start: 2022-04-20

## 2022-04-26 ENCOUNTER — HOSPITAL ENCOUNTER (OUTPATIENT)
Dept: ULTRASOUND IMAGING | Facility: HOSPITAL | Age: 82
Discharge: HOME | End: 2022-04-26
Attending: INTERNAL MEDICINE
Payer: MEDICARE

## 2022-04-26 DIAGNOSIS — M79.89 LEFT LEG SWELLING: ICD-10-CM

## 2022-04-26 DIAGNOSIS — I82.432 ACUTE DEEP VEIN THROMBOSIS (DVT) OF POPLITEAL VEIN OF LEFT LOWER EXTREMITY (HCC): ICD-10-CM

## 2022-04-26 PROCEDURE — 93971 EXTREMITY STUDY: CPT | Performed by: INTERNAL MEDICINE

## 2022-04-28 ENCOUNTER — OFFICE VISIT (OUTPATIENT)
Dept: HEMATOLOGY/ONCOLOGY | Facility: HOSPITAL | Age: 82
End: 2022-04-28
Attending: INTERNAL MEDICINE
Payer: MEDICARE

## 2022-04-28 VITALS
TEMPERATURE: 97 F | DIASTOLIC BLOOD PRESSURE: 81 MMHG | HEIGHT: 62.01 IN | BODY MASS INDEX: 21.59 KG/M2 | RESPIRATION RATE: 16 BRPM | HEART RATE: 66 BPM | OXYGEN SATURATION: 97 % | SYSTOLIC BLOOD PRESSURE: 131 MMHG | WEIGHT: 118.81 LBS

## 2022-04-28 DIAGNOSIS — D72.810 LYMPHOPENIA: ICD-10-CM

## 2022-04-28 DIAGNOSIS — I82.432 ACUTE DEEP VEIN THROMBOSIS (DVT) OF POPLITEAL VEIN OF LEFT LOWER EXTREMITY (HCC): ICD-10-CM

## 2022-04-28 DIAGNOSIS — C84.48 PERIPHERAL T-CELL LYMPHOMA OF LYMPH NODES OF MULTIPLE REGIONS (HCC): ICD-10-CM

## 2022-04-28 DIAGNOSIS — R53.83 FATIGUE DUE TO TREATMENT: ICD-10-CM

## 2022-04-28 DIAGNOSIS — C84.48 PERIPHERAL T-CELL LYMPHOMA OF LYMPH NODES OF MULTIPLE REGIONS (HCC): Primary | ICD-10-CM

## 2022-04-28 LAB
ALBUMIN SERPL-MCNC: 3.5 G/DL (ref 3.4–5)
ALBUMIN/GLOB SERPL: 1.3 {RATIO} (ref 1–2)
ALP LIVER SERPL-CCNC: 91 U/L
ALT SERPL-CCNC: 27 U/L
ANION GAP SERPL CALC-SCNC: 5 MMOL/L (ref 0–18)
AST SERPL-CCNC: 19 U/L (ref 15–37)
BASOPHILS # BLD AUTO: 0.05 X10(3) UL (ref 0–0.2)
BASOPHILS NFR BLD AUTO: 1.2 %
BILIRUB SERPL-MCNC: 0.8 MG/DL (ref 0.1–2)
BUN BLD-MCNC: 15 MG/DL (ref 7–18)
CALCIUM BLD-MCNC: 8.7 MG/DL (ref 8.5–10.1)
CHLORIDE SERPL-SCNC: 110 MMOL/L (ref 98–112)
CO2 SERPL-SCNC: 26 MMOL/L (ref 21–32)
CREAT BLD-MCNC: 0.88 MG/DL
EOSINOPHIL # BLD AUTO: 0.14 X10(3) UL (ref 0–0.7)
EOSINOPHIL NFR BLD AUTO: 3.4 %
ERYTHROCYTE [DISTWIDTH] IN BLOOD BY AUTOMATED COUNT: 12.9 %
GLOBULIN PLAS-MCNC: 2.7 G/DL (ref 2.8–4.4)
GLUCOSE BLD-MCNC: 96 MG/DL (ref 70–99)
HCT VFR BLD AUTO: 40.5 %
HGB BLD-MCNC: 13.9 G/DL
IMM GRANULOCYTES # BLD AUTO: 0.01 X10(3) UL (ref 0–1)
IMM GRANULOCYTES NFR BLD: 0.2 %
LDH SERPL L TO P-CCNC: 210 U/L
LYMPHOCYTES # BLD AUTO: 0.49 X10(3) UL (ref 1–4)
LYMPHOCYTES NFR BLD AUTO: 11.8 %
MCH RBC QN AUTO: 34.5 PG (ref 26–34)
MCHC RBC AUTO-ENTMCNC: 34.3 G/DL (ref 31–37)
MCV RBC AUTO: 100.5 FL
MONOCYTES # BLD AUTO: 0.45 X10(3) UL (ref 0.1–1)
MONOCYTES NFR BLD AUTO: 10.8 %
NEUTROPHILS # BLD AUTO: 3.03 X10 (3) UL (ref 1.5–7.7)
NEUTROPHILS # BLD AUTO: 3.03 X10(3) UL (ref 1.5–7.7)
NEUTROPHILS NFR BLD AUTO: 72.6 %
OSMOLALITY SERPL CALC.SUM OF ELEC: 293 MOSM/KG (ref 275–295)
PLATELET # BLD AUTO: 150 10(3)UL (ref 150–450)
POTASSIUM SERPL-SCNC: 3.7 MMOL/L (ref 3.5–5.1)
PROT SERPL-MCNC: 6.2 G/DL (ref 6.4–8.2)
RBC # BLD AUTO: 4.03 X10(6)UL
SODIUM SERPL-SCNC: 141 MMOL/L (ref 136–145)
WBC # BLD AUTO: 4.2 X10(3) UL (ref 4–11)

## 2022-04-28 PROCEDURE — 83615 LACTATE (LD) (LDH) ENZYME: CPT

## 2022-04-28 PROCEDURE — 80053 COMPREHEN METABOLIC PANEL: CPT

## 2022-04-28 PROCEDURE — 99215 OFFICE O/P EST HI 40 MIN: CPT | Performed by: INTERNAL MEDICINE

## 2022-04-28 PROCEDURE — 85025 COMPLETE CBC W/AUTO DIFF WBC: CPT

## 2022-04-28 PROCEDURE — 36415 COLL VENOUS BLD VENIPUNCTURE: CPT

## 2022-04-28 PROCEDURE — 96523 IRRIG DRUG DELIVERY DEVICE: CPT

## 2022-04-28 RX ORDER — SULFAMETHOXAZOLE AND TRIMETHOPRIM 800; 160 MG/1; MG/1
1 TABLET ORAL
Qty: 15 TABLET | Refills: 3 | Status: SHIPPED | OUTPATIENT
Start: 2022-04-29

## 2022-04-28 RX ORDER — ACYCLOVIR 400 MG/1
400 TABLET ORAL 2 TIMES DAILY
Qty: 60 TABLET | Refills: 3 | Status: SHIPPED | OUTPATIENT
Start: 2022-04-28

## 2022-05-13 RX ORDER — ACYCLOVIR 400 MG/1
TABLET ORAL
Qty: 60 TABLET | Refills: 2 | Status: SHIPPED | OUTPATIENT
Start: 2022-05-13

## 2022-05-23 RX ORDER — ESCITALOPRAM OXALATE 5 MG/1
TABLET ORAL
Qty: 30 TABLET | Refills: 0 | Status: SHIPPED | OUTPATIENT
Start: 2022-05-23

## 2022-06-03 ENCOUNTER — TELEPHONE (OUTPATIENT)
Dept: HEMATOLOGY/ONCOLOGY | Facility: HOSPITAL | Age: 82
End: 2022-06-03

## 2022-06-03 NOTE — TELEPHONE ENCOUNTER
Spoke with faviola in central scheduling. They will call pt to [facility] a few days before \"expected\" date.

## 2022-06-03 NOTE — TELEPHONE ENCOUNTER
Pet scan can be scheduled 7/28 on on. Can Dr. America Craft change the require date on the order to before 7/27 so Judy Meléndez can look at earlier appointments. Please call Judy Meléndez back and confirm the change.  Fax# [phone]

## 2022-06-03 NOTE — TELEPHONE ENCOUNTER
LVM that ok to schedule before the \"expected date\" needs to be done a few days before sees Dr. Wojciech Cutler.

## 2022-06-08 ENCOUNTER — OFFICE VISIT (OUTPATIENT)
Dept: DERMATOLOGY | Age: 82
End: 2022-06-08

## 2022-06-08 DIAGNOSIS — L85.3 XEROSIS CUTIS: ICD-10-CM

## 2022-06-08 DIAGNOSIS — L57.0 ACTINIC KERATOSES: Primary | ICD-10-CM

## 2022-06-08 PROCEDURE — 99213 OFFICE O/P EST LOW 20 MIN: CPT | Performed by: DERMATOLOGY

## 2022-06-08 PROCEDURE — 17000 DESTRUCT PREMALG LESION: CPT | Performed by: DERMATOLOGY

## 2022-06-08 PROCEDURE — 17003 DESTRUCT PREMALG LES 2-14: CPT | Performed by: DERMATOLOGY

## 2022-06-17 RX ORDER — ESCITALOPRAM OXALATE 5 MG/1
TABLET ORAL
Qty: 30 TABLET | Refills: 0 | Status: SHIPPED | OUTPATIENT
Start: 2022-06-17

## 2022-07-11 DIAGNOSIS — I10 ESSENTIAL HYPERTENSION: ICD-10-CM

## 2022-07-11 RX ORDER — AMLODIPINE BESYLATE 5 MG/1
TABLET ORAL
Qty: 90 TABLET | Refills: 0 | Status: SHIPPED | OUTPATIENT
Start: 2022-07-11

## 2022-07-12 DIAGNOSIS — I10 ESSENTIAL HYPERTENSION: ICD-10-CM

## 2022-07-13 RX ORDER — AMLODIPINE BESYLATE 5 MG/1
TABLET ORAL
Qty: 90 TABLET | Refills: 0 | Status: SHIPPED | OUTPATIENT
Start: 2022-07-13

## 2022-07-14 ENCOUNTER — OFFICE VISIT (OUTPATIENT)
Dept: DERMATOLOGY | Age: 82
End: 2022-07-14

## 2022-07-14 DIAGNOSIS — L57.0 ACTINIC KERATOSES: Primary | ICD-10-CM

## 2022-07-14 DIAGNOSIS — I87.2 VENOUS STASIS DERMATITIS, UNSPECIFIED LATERALITY: ICD-10-CM

## 2022-07-14 PROCEDURE — 17000 DESTRUCT PREMALG LESION: CPT | Performed by: DERMATOLOGY

## 2022-07-14 PROCEDURE — 99213 OFFICE O/P EST LOW 20 MIN: CPT | Performed by: DERMATOLOGY

## 2022-07-14 PROCEDURE — 17003 DESTRUCT PREMALG LES 2-14: CPT | Performed by: DERMATOLOGY

## 2022-07-25 ENCOUNTER — HOSPITAL ENCOUNTER (OUTPATIENT)
Dept: NUCLEAR MEDICINE | Facility: HOSPITAL | Age: 82
Discharge: HOME | End: 2022-07-25
Attending: INTERNAL MEDICINE
Payer: MEDICARE

## 2022-07-25 DIAGNOSIS — C84.48 PERIPHERAL T-CELL LYMPHOMA OF LYMPH NODES OF MULTIPLE REGIONS (HCC): ICD-10-CM

## 2022-07-25 LAB — GLUCOSE BLD-MCNC: 93 MG/DL (ref 70–99)

## 2022-07-25 PROCEDURE — 78815 PET IMAGE W/CT SKULL-THIGH: CPT | Performed by: INTERNAL MEDICINE

## 2022-07-25 PROCEDURE — 82962 GLUCOSE BLOOD TEST: CPT

## 2022-07-27 ENCOUNTER — OFFICE VISIT (OUTPATIENT)
Dept: HEMATOLOGY/ONCOLOGY | Facility: HOSPITAL | Age: 82
End: 2022-07-27
Attending: INTERNAL MEDICINE
Payer: MEDICARE

## 2022-07-27 VITALS
RESPIRATION RATE: 18 BRPM | TEMPERATURE: 98 F | OXYGEN SATURATION: 96 % | DIASTOLIC BLOOD PRESSURE: 65 MMHG | SYSTOLIC BLOOD PRESSURE: 118 MMHG | HEART RATE: 67 BPM | BODY MASS INDEX: 23 KG/M2 | WEIGHT: 123.63 LBS

## 2022-07-27 DIAGNOSIS — C84.48 PERIPHERAL T-CELL LYMPHOMA OF LYMPH NODES OF MULTIPLE REGIONS (HCC): ICD-10-CM

## 2022-07-27 DIAGNOSIS — C84.48 PERIPHERAL T-CELL LYMPHOMA OF LYMPH NODES OF MULTIPLE REGIONS (HCC): Primary | ICD-10-CM

## 2022-07-27 DIAGNOSIS — K59.01 SLOW TRANSIT CONSTIPATION: ICD-10-CM

## 2022-07-27 DIAGNOSIS — R26.81 UNSTABLE GAIT: ICD-10-CM

## 2022-07-27 DIAGNOSIS — D72.810 LYMPHOPENIA: ICD-10-CM

## 2022-07-27 LAB
ALBUMIN SERPL-MCNC: 3.3 G/DL (ref 3.4–5)
ALBUMIN/GLOB SERPL: 1.3 {RATIO} (ref 1–2)
ALP LIVER SERPL-CCNC: 94 U/L
ALT SERPL-CCNC: 26 U/L
ANION GAP SERPL CALC-SCNC: 3 MMOL/L (ref 0–18)
AST SERPL-CCNC: 14 U/L (ref 15–37)
BASOPHILS # BLD AUTO: 0.04 X10(3) UL (ref 0–0.2)
BASOPHILS NFR BLD AUTO: 0.7 %
BILIRUB SERPL-MCNC: 0.6 MG/DL (ref 0.1–2)
BUN BLD-MCNC: 15 MG/DL (ref 7–18)
CALCIUM BLD-MCNC: 8.4 MG/DL (ref 8.5–10.1)
CHLORIDE SERPL-SCNC: 112 MMOL/L (ref 98–112)
CO2 SERPL-SCNC: 26 MMOL/L (ref 21–32)
CREAT BLD-MCNC: 0.85 MG/DL
EOSINOPHIL # BLD AUTO: 0.16 X10(3) UL (ref 0–0.7)
EOSINOPHIL NFR BLD AUTO: 3 %
ERYTHROCYTE [DISTWIDTH] IN BLOOD BY AUTOMATED COUNT: 13 %
GLOBULIN PLAS-MCNC: 2.5 G/DL (ref 2.8–4.4)
GLUCOSE BLD-MCNC: 114 MG/DL (ref 70–99)
HCT VFR BLD AUTO: 39.4 %
HGB BLD-MCNC: 13.7 G/DL
IMM GRANULOCYTES # BLD AUTO: 0.01 X10(3) UL (ref 0–1)
IMM GRANULOCYTES NFR BLD: 0.2 %
LDH SERPL L TO P-CCNC: 190 U/L
LYMPHOCYTES # BLD AUTO: 0.54 X10(3) UL (ref 1–4)
LYMPHOCYTES NFR BLD AUTO: 10 %
MCH RBC QN AUTO: 34.4 PG (ref 26–34)
MCHC RBC AUTO-ENTMCNC: 34.8 G/DL (ref 31–37)
MCV RBC AUTO: 99 FL
MONOCYTES # BLD AUTO: 0.43 X10(3) UL (ref 0.1–1)
MONOCYTES NFR BLD AUTO: 8 %
NEUTROPHILS # BLD AUTO: 4.2 X10 (3) UL (ref 1.5–7.7)
NEUTROPHILS # BLD AUTO: 4.2 X10(3) UL (ref 1.5–7.7)
NEUTROPHILS NFR BLD AUTO: 78.1 %
OSMOLALITY SERPL CALC.SUM OF ELEC: 294 MOSM/KG (ref 275–295)
PLATELET # BLD AUTO: 148 10(3)UL (ref 150–450)
POTASSIUM SERPL-SCNC: 3.9 MMOL/L (ref 3.5–5.1)
PROT SERPL-MCNC: 5.8 G/DL (ref 6.4–8.2)
RBC # BLD AUTO: 3.98 X10(6)UL
SODIUM SERPL-SCNC: 141 MMOL/L (ref 136–145)
WBC # BLD AUTO: 5.4 X10(3) UL (ref 4–11)

## 2022-07-27 PROCEDURE — 80053 COMPREHEN METABOLIC PANEL: CPT

## 2022-07-27 PROCEDURE — 85025 COMPLETE CBC W/AUTO DIFF WBC: CPT

## 2022-07-27 PROCEDURE — 36591 DRAW BLOOD OFF VENOUS DEVICE: CPT

## 2022-07-27 PROCEDURE — 83615 LACTATE (LD) (LDH) ENZYME: CPT

## 2022-07-27 PROCEDURE — 99215 OFFICE O/P EST HI 40 MIN: CPT | Performed by: INTERNAL MEDICINE

## 2022-07-27 RX ORDER — ACYCLOVIR 400 MG/1
400 TABLET ORAL 2 TIMES DAILY
Qty: 60 TABLET | Refills: 2 | Status: SHIPPED | OUTPATIENT
Start: 2022-07-27

## 2022-07-27 NOTE — PROGRESS NOTES
Education Record    Learner:  Patient    Disease / Diagnosis: Pt here for CLL draw     Barriers / Limitations:  None    Method:  Brief focused, printed material and  reinforcement    General Topics:  Plan of care reviewed    Outcome:  Shows understanding

## 2022-07-28 ENCOUNTER — APPOINTMENT (OUTPATIENT)
Dept: HEMATOLOGY/ONCOLOGY | Facility: HOSPITAL | Age: 82
End: 2022-07-28
Attending: INTERNAL MEDICINE
Payer: MEDICARE

## 2022-08-01 ENCOUNTER — TELEPHONE (OUTPATIENT)
Dept: FAMILY MEDICINE CLINIC | Facility: CLINIC | Age: 82
End: 2022-08-01

## 2022-08-01 RX ORDER — ESCITALOPRAM OXALATE 5 MG/1
TABLET ORAL
Qty: 90 TABLET | Refills: 0 | Status: SHIPPED | OUTPATIENT
Start: 2022-08-01

## 2022-08-01 NOTE — TELEPHONE ENCOUNTER
PT WOULD LIKE 90 DAY SUPPLY IF POSSIBLE-  THANK YOU    ESCITALOPRAM 5 MG Oral Tab [Pharmacy Med Name: Escitalopram Oxalate 5 MG Oral Tablet]

## 2022-08-19 ENCOUNTER — TELEPHONE (OUTPATIENT)
Dept: HEMATOLOGY/ONCOLOGY | Facility: HOSPITAL | Age: 82
End: 2022-08-19

## 2022-08-19 NOTE — TELEPHONE ENCOUNTER
Patient's daughter called. Patient tested positive for Covid 8/12/22. She wants to now if he should stillget the Evusheld injection. Please call Agustín Le to discuss.

## 2022-08-25 ENCOUNTER — APPOINTMENT (OUTPATIENT)
Dept: DERMATOLOGY | Age: 82
End: 2022-08-25

## 2022-08-26 ENCOUNTER — APPOINTMENT (OUTPATIENT)
Dept: HEMATOLOGY/ONCOLOGY | Facility: HOSPITAL | Age: 82
End: 2022-08-26
Attending: INTERNAL MEDICINE
Payer: MEDICARE

## 2022-09-26 ENCOUNTER — TELEPHONE (OUTPATIENT)
Dept: HEMATOLOGY/ONCOLOGY | Facility: HOSPITAL | Age: 82
End: 2022-09-26

## 2022-09-26 RX ORDER — ACYCLOVIR 400 MG/1
400 TABLET ORAL 2 TIMES DAILY
Qty: 60 TABLET | Refills: 2 | Status: SHIPPED | OUTPATIENT
Start: 2022-09-26

## 2022-09-30 ENCOUNTER — OFFICE VISIT (OUTPATIENT)
Dept: HEMATOLOGY/ONCOLOGY | Facility: HOSPITAL | Age: 82
End: 2022-09-30
Attending: INTERNAL MEDICINE

## 2022-09-30 VITALS
BODY MASS INDEX: 22.53 KG/M2 | RESPIRATION RATE: 16 BRPM | DIASTOLIC BLOOD PRESSURE: 90 MMHG | TEMPERATURE: 97 F | HEART RATE: 80 BPM | SYSTOLIC BLOOD PRESSURE: 139 MMHG | OXYGEN SATURATION: 97 % | HEIGHT: 62.01 IN | WEIGHT: 124 LBS

## 2022-09-30 DIAGNOSIS — C84.48 PERIPHERAL T-CELL LYMPHOMA OF LYMPH NODES OF MULTIPLE REGIONS (HCC): ICD-10-CM

## 2022-09-30 DIAGNOSIS — D84.9 IMMUNOCOMPROMISED (HCC): Primary | ICD-10-CM

## 2022-10-05 ENCOUNTER — TELEPHONE (OUTPATIENT)
Facility: LOCATION | Age: 82
End: 2022-10-05

## 2022-10-05 NOTE — TELEPHONE ENCOUNTER
Pt called because he trusts Dr Raymon Zabala, he says his wife is having issues with her rotator cuff, and he wants to know if the Dr has any recommendations for who she can see.  Callback number - [phone]

## 2022-10-06 ENCOUNTER — TELEPHONE (OUTPATIENT)
Dept: HEMATOLOGY/ONCOLOGY | Facility: HOSPITAL | Age: 82
End: 2022-10-06

## 2022-10-06 NOTE — TELEPHONE ENCOUNTER
LVM that MD has not seen any papers or signed any orders. Left alternate fax number asking to fax again and will have MD sign.

## 2022-10-06 NOTE — TELEPHONE ENCOUNTER
Returned call to patient. Per Usman Ocampo, advised that DR. Radha Gomez would refer them to Dr. Marrian Meigs and phone number was provided.

## 2022-10-06 NOTE — TELEPHONE ENCOUNTER
Erin is calling from [facility]. She is calling because she states she has faxed over an Initial Evaluation at least 5 times and it hasn't gotten signed and returned back. She stated the most recent fax was yesterday and if it can please get signed and faxed back. The fax number is [phone] and the call back number is [phone].

## 2022-10-25 ENCOUNTER — OFFICE VISIT (OUTPATIENT)
Dept: DERMATOLOGY | Age: 82
End: 2022-10-25

## 2022-10-25 DIAGNOSIS — L57.0 ACTINIC KERATOSES: Primary | ICD-10-CM

## 2022-10-25 PROCEDURE — 17003 DESTRUCT PREMALG LES 2-14: CPT | Performed by: DERMATOLOGY

## 2022-10-25 PROCEDURE — 17000 DESTRUCT PREMALG LESION: CPT | Performed by: DERMATOLOGY

## 2022-10-25 RX ORDER — FLUOROURACIL 50 MG/G
CREAM TOPICAL
Qty: 40 G | Refills: 2 | Status: SHIPPED | OUTPATIENT
Start: 2022-10-25

## 2022-10-26 ENCOUNTER — OFFICE VISIT (OUTPATIENT)
Dept: HEMATOLOGY/ONCOLOGY | Facility: HOSPITAL | Age: 82
End: 2022-10-26
Attending: INTERNAL MEDICINE
Payer: MEDICARE

## 2022-10-26 VITALS
WEIGHT: 126.81 LBS | TEMPERATURE: 98 F | RESPIRATION RATE: 18 BRPM | HEART RATE: 68 BPM | DIASTOLIC BLOOD PRESSURE: 82 MMHG | BODY MASS INDEX: 23 KG/M2 | SYSTOLIC BLOOD PRESSURE: 135 MMHG | OXYGEN SATURATION: 96 %

## 2022-10-26 DIAGNOSIS — R26.81 UNSTABLE GAIT: ICD-10-CM

## 2022-10-26 DIAGNOSIS — R32 URINARY INCONTINENCE, UNSPECIFIED TYPE: ICD-10-CM

## 2022-10-26 DIAGNOSIS — C84.48 PERIPHERAL T-CELL LYMPHOMA OF LYMPH NODES OF MULTIPLE REGIONS (HCC): ICD-10-CM

## 2022-10-26 DIAGNOSIS — C84.48 PERIPHERAL T-CELL LYMPHOMA OF LYMPH NODES OF MULTIPLE REGIONS (HCC): Primary | ICD-10-CM

## 2022-10-26 DIAGNOSIS — D72.810 LYMPHOPENIA: ICD-10-CM

## 2022-10-26 DIAGNOSIS — Z86.718 HISTORY OF DVT (DEEP VEIN THROMBOSIS): ICD-10-CM

## 2022-10-26 LAB
ALBUMIN SERPL-MCNC: 3.5 G/DL (ref 3.4–5)
ALBUMIN/GLOB SERPL: 1.2 {RATIO} (ref 1–2)
ALP LIVER SERPL-CCNC: 89 U/L
ALT SERPL-CCNC: 21 U/L
ANION GAP SERPL CALC-SCNC: 5 MMOL/L (ref 0–18)
AST SERPL-CCNC: 17 U/L (ref 15–37)
BASOPHILS # BLD AUTO: 0.05 X10(3) UL (ref 0–0.2)
BASOPHILS NFR BLD AUTO: 1.2 %
BILIRUB SERPL-MCNC: 0.8 MG/DL (ref 0.1–2)
BUN BLD-MCNC: 18 MG/DL (ref 7–18)
CALCIUM BLD-MCNC: 8.9 MG/DL (ref 8.5–10.1)
CHLORIDE SERPL-SCNC: 109 MMOL/L (ref 98–112)
CO2 SERPL-SCNC: 26 MMOL/L (ref 21–32)
CREAT BLD-MCNC: 0.78 MG/DL
EOSINOPHIL # BLD AUTO: 0.17 X10(3) UL (ref 0–0.7)
EOSINOPHIL NFR BLD AUTO: 3.9 %
ERYTHROCYTE [DISTWIDTH] IN BLOOD BY AUTOMATED COUNT: 13 %
FASTING STATUS PATIENT QL REPORTED: NO
GFR SERPLBLD BASED ON 1.73 SQ M-ARVRAT: 90 ML/MIN/1.73M2 (ref 60–?)
GLOBULIN PLAS-MCNC: 2.9 G/DL (ref 2.8–4.4)
GLUCOSE BLD-MCNC: 92 MG/DL (ref 70–99)
HCT VFR BLD AUTO: 42 %
HGB BLD-MCNC: 14.8 G/DL
IMM GRANULOCYTES # BLD AUTO: 0.01 X10(3) UL (ref 0–1)
IMM GRANULOCYTES NFR BLD: 0.2 %
LDH SERPL L TO P-CCNC: 198 U/L
LYMPHOCYTES # BLD AUTO: 0.75 X10(3) UL (ref 1–4)
LYMPHOCYTES NFR BLD AUTO: 17.4 %
MCH RBC QN AUTO: 34.9 PG (ref 26–34)
MCHC RBC AUTO-ENTMCNC: 35.2 G/DL (ref 31–37)
MCV RBC AUTO: 99.1 FL
MONOCYTES # BLD AUTO: 0.45 X10(3) UL (ref 0.1–1)
MONOCYTES NFR BLD AUTO: 10.4 %
NEUTROPHILS # BLD AUTO: 2.88 X10 (3) UL (ref 1.5–7.7)
NEUTROPHILS # BLD AUTO: 2.88 X10(3) UL (ref 1.5–7.7)
NEUTROPHILS NFR BLD AUTO: 66.9 %
OSMOLALITY SERPL CALC.SUM OF ELEC: 292 MOSM/KG (ref 275–295)
PLATELET # BLD AUTO: 152 10(3)UL (ref 150–450)
POTASSIUM SERPL-SCNC: 3.7 MMOL/L (ref 3.5–5.1)
PROT SERPL-MCNC: 6.4 G/DL (ref 6.4–8.2)
RBC # BLD AUTO: 4.24 X10(6)UL
SODIUM SERPL-SCNC: 140 MMOL/L (ref 136–145)
WBC # BLD AUTO: 4.3 X10(3) UL (ref 4–11)

## 2022-10-26 PROCEDURE — 36591 DRAW BLOOD OFF VENOUS DEVICE: CPT

## 2022-10-26 PROCEDURE — 80053 COMPREHEN METABOLIC PANEL: CPT

## 2022-10-26 PROCEDURE — 83615 LACTATE (LD) (LDH) ENZYME: CPT

## 2022-10-26 PROCEDURE — 99214 OFFICE O/P EST MOD 30 MIN: CPT | Performed by: INTERNAL MEDICINE

## 2022-10-26 PROCEDURE — 85025 COMPLETE CBC W/AUTO DIFF WBC: CPT

## 2022-10-26 RX ORDER — FLUOROURACIL 50 MG/G
CREAM TOPICAL
COMMUNITY
Start: 2022-10-25

## 2022-10-26 NOTE — PROGRESS NOTES
Education Record    Learner:  Patient and Spouse    Disease / Diagnosis: here for labs    Barriers / Limitations:  None   Comments:    Method:  Discussion   Comments:    General Topics:  Plan of care reviewed   Comments:    Outcome:  Shows understanding   Comments:  PAC accessed per protocol and labs drawn. Wife states it has been awhile since [facility] was last used. Reviewed need to make appointment every 6-12 weeks for a PAC flush if not being used. Verbalized understanding. Discharged to MD appointment in stable condition, no new complaints.

## 2022-10-27 PROBLEM — Z86.718 HISTORY OF DVT (DEEP VEIN THROMBOSIS): Status: ACTIVE | Noted: 2022-10-27

## 2022-10-27 PROBLEM — I82.402 DEEP VEIN THROMBOSIS (DVT) OF LEFT LOWER EXTREMITY (HCC): Status: RESOLVED | Noted: 2022-04-08 | Resolved: 2022-10-27

## 2022-10-27 PROBLEM — R32 URINARY INCONTINENCE: Status: ACTIVE | Noted: 2022-10-27

## 2022-12-13 ENCOUNTER — OFFICE VISIT (OUTPATIENT)
Dept: DERMATOLOGY | Age: 82
End: 2022-12-13

## 2022-12-13 DIAGNOSIS — L57.0 ACTINIC KERATOSES: Primary | ICD-10-CM

## 2022-12-13 PROCEDURE — 17000 DESTRUCT PREMALG LESION: CPT | Performed by: DERMATOLOGY

## 2022-12-13 PROCEDURE — 17003 DESTRUCT PREMALG LES 2-14: CPT | Performed by: DERMATOLOGY

## 2022-12-27 DIAGNOSIS — I10 ESSENTIAL HYPERTENSION: ICD-10-CM

## 2022-12-27 RX ORDER — ESCITALOPRAM OXALATE 5 MG/1
TABLET ORAL
Qty: 90 TABLET | Refills: 0 | Status: SHIPPED | OUTPATIENT
Start: 2022-12-27

## 2022-12-27 RX ORDER — AMLODIPINE BESYLATE 5 MG/1
5 TABLET ORAL DAILY
Qty: 90 TABLET | Refills: 0 | Status: SHIPPED | OUTPATIENT
Start: 2022-12-27

## 2022-12-27 NOTE — TELEPHONE ENCOUNTER
LOV  04/08/2022    LAST LAB  10/26/2022    LAST RX  08/01/2022    Next OV    Future Appointments   Date Time Provider Kavon Lugoi   1/13/2023  1:00 PM Brandon Magana MD [facility] EC Nap [address]   1/25/2023 10:00 AM [address] [facility] RN3 [id number] [facility]   1/25/2023 10:30 AM Lizz Tijerina MD [id number] [employer]   3/31/2023  3:00  [facility] protocol

## 2022-12-27 NOTE — TELEPHONE ENCOUNTER
Last seen 4/8/2022  Future Appointments   Date Time Provider Kavon Suzanne   1/13/2023  1:00 PM Meet Guajardo MD [facility] EC [address]   1/25/2023 10:00 AM [address] [facility] RN3 [facility]   1/25/2023 10:30 AM Liliya Anguiano MD [address]   3/31/2023  3:00  [address]

## 2022-12-28 NOTE — TELEPHONE ENCOUNTER
Patient instructed if staying with Dr Conrado Coffey needs to be seen in f/u. Patient comments he lives in [place] now, and [place]; he will find another doctor closer to him. Explained doctor did refill his scripts, so has some time to get appt made and established with new doctor.

## 2023-01-11 ENCOUNTER — TELEPHONE (OUTPATIENT)
Dept: SURGERY | Facility: CLINIC | Age: 83
End: 2023-01-11

## 2023-01-11 NOTE — TELEPHONE ENCOUNTER
I called the patient and apologized for RS his appt on this Friday. I discussed that there was an error made and I placed his appt back on Friday 1/13/2023.

## 2023-01-13 ENCOUNTER — OFFICE VISIT (OUTPATIENT)
Dept: SURGERY | Facility: CLINIC | Age: 83
End: 2023-01-13

## 2023-01-13 ENCOUNTER — LAB ENCOUNTER (OUTPATIENT)
Dept: LAB | Age: 83
End: 2023-01-13
Attending: UROLOGY
Payer: MEDICARE

## 2023-01-13 DIAGNOSIS — C84.48 PERIPHERAL T-CELL LYMPHOMA OF LYMPH NODES OF MULTIPLE REGIONS (HCC): ICD-10-CM

## 2023-01-13 DIAGNOSIS — C61 PROSTATE CANCER (HCC): Primary | ICD-10-CM

## 2023-01-13 DIAGNOSIS — N20.0 RECURRENT KIDNEY STONES: ICD-10-CM

## 2023-01-13 DIAGNOSIS — N39.3 STRESS INCONTINENCE: ICD-10-CM

## 2023-01-13 DIAGNOSIS — C61 PROSTATE CANCER (HCC): ICD-10-CM

## 2023-01-13 LAB
ALBUMIN SERPL-MCNC: 3.5 G/DL (ref 3.4–5)
ALBUMIN/GLOB SERPL: 1.4 {RATIO} (ref 1–2)
ALP LIVER SERPL-CCNC: 102 U/L
ALT SERPL-CCNC: 24 U/L
ANION GAP SERPL CALC-SCNC: 4 MMOL/L (ref 0–18)
AST SERPL-CCNC: 18 U/L (ref 15–37)
BASOPHILS # BLD AUTO: 0.06 X10(3) UL (ref 0–0.2)
BASOPHILS NFR BLD AUTO: 1.1 %
BILIRUB SERPL-MCNC: 0.6 MG/DL (ref 0.1–2)
BUN BLD-MCNC: 16 MG/DL (ref 7–18)
CALCIUM BLD-MCNC: 8.9 MG/DL (ref 8.5–10.1)
CHLORIDE SERPL-SCNC: 111 MMOL/L (ref 98–112)
CO2 SERPL-SCNC: 27 MMOL/L (ref 21–32)
CREAT BLD-MCNC: 0.77 MG/DL
EOSINOPHIL # BLD AUTO: 0.16 X10(3) UL (ref 0–0.7)
EOSINOPHIL NFR BLD AUTO: 2.9 %
ERYTHROCYTE [DISTWIDTH] IN BLOOD BY AUTOMATED COUNT: 13 %
FASTING STATUS PATIENT QL REPORTED: NO
GFR SERPLBLD BASED ON 1.73 SQ M-ARVRAT: 91 ML/MIN/1.73M2 (ref 60–?)
GLOBULIN PLAS-MCNC: 2.5 G/DL (ref 2.8–4.4)
GLUCOSE BLD-MCNC: 86 MG/DL (ref 70–99)
HCT VFR BLD AUTO: 44.9 %
HGB BLD-MCNC: 15.5 G/DL
IMM GRANULOCYTES # BLD AUTO: 0.01 X10(3) UL (ref 0–1)
IMM GRANULOCYTES NFR BLD: 0.2 %
LDH SERPL L TO P-CCNC: 188 U/L
LYMPHOCYTES # BLD AUTO: 0.77 X10(3) UL (ref 1–4)
LYMPHOCYTES NFR BLD AUTO: 13.8 %
MCH RBC QN AUTO: 33.5 PG (ref 26–34)
MCHC RBC AUTO-ENTMCNC: 34.5 G/DL (ref 31–37)
MCV RBC AUTO: 97 FL
MONOCYTES # BLD AUTO: 0.54 X10(3) UL (ref 0.1–1)
MONOCYTES NFR BLD AUTO: 9.6 %
NEUTROPHILS # BLD AUTO: 4.06 X10 (3) UL (ref 1.5–7.7)
NEUTROPHILS # BLD AUTO: 4.06 X10(3) UL (ref 1.5–7.7)
NEUTROPHILS NFR BLD AUTO: 72.4 %
OSMOLALITY SERPL CALC.SUM OF ELEC: 294 MOSM/KG (ref 275–295)
PLATELET # BLD AUTO: 168 10(3)UL (ref 150–450)
POTASSIUM SERPL-SCNC: 4 MMOL/L (ref 3.5–5.1)
PROT SERPL-MCNC: 6 G/DL (ref 6.4–8.2)
PSA SERPL-MCNC: <0.01 NG/ML (ref ?–4)
RBC # BLD AUTO: 4.63 X10(6)UL
SODIUM SERPL-SCNC: 142 MMOL/L (ref 136–145)
WBC # BLD AUTO: 5.6 X10(3) UL (ref 4–11)

## 2023-01-13 PROCEDURE — 85025 COMPLETE CBC W/AUTO DIFF WBC: CPT

## 2023-01-13 PROCEDURE — 36415 COLL VENOUS BLD VENIPUNCTURE: CPT

## 2023-01-13 PROCEDURE — 80053 COMPREHEN METABOLIC PANEL: CPT

## 2023-01-13 PROCEDURE — 99204 OFFICE O/P NEW MOD 45 MIN: CPT | Performed by: UROLOGY

## 2023-01-13 PROCEDURE — 84153 ASSAY OF PSA TOTAL: CPT

## 2023-01-13 PROCEDURE — 83615 LACTATE (LD) (LDH) ENZYME: CPT

## 2023-01-25 ENCOUNTER — NURSE ONLY (OUTPATIENT)
Dept: HEMATOLOGY/ONCOLOGY | Facility: HOSPITAL | Age: 83
End: 2023-01-25
Attending: INTERNAL MEDICINE
Payer: MEDICARE

## 2023-01-25 ENCOUNTER — HOSPITAL ENCOUNTER (OUTPATIENT)
Dept: CT IMAGING | Facility: HOSPITAL | Age: 83
Discharge: HOME | End: 2023-01-25
Attending: UROLOGY
Payer: MEDICARE

## 2023-01-25 VITALS
WEIGHT: 125 LBS | DIASTOLIC BLOOD PRESSURE: 76 MMHG | TEMPERATURE: 98 F | SYSTOLIC BLOOD PRESSURE: 132 MMHG | OXYGEN SATURATION: 96 % | BODY MASS INDEX: 23 KG/M2 | HEART RATE: 71 BPM | RESPIRATION RATE: 16 BRPM

## 2023-01-25 DIAGNOSIS — C84.48 PERIPHERAL T-CELL LYMPHOMA OF LYMPH NODES OF MULTIPLE REGIONS (HCC): Primary | ICD-10-CM

## 2023-01-25 DIAGNOSIS — R26.81 UNSTABLE GAIT: ICD-10-CM

## 2023-01-25 DIAGNOSIS — D72.810 LYMPHOPENIA: ICD-10-CM

## 2023-01-25 DIAGNOSIS — N20.0 RECURRENT KIDNEY STONES: ICD-10-CM

## 2023-01-25 DIAGNOSIS — R13.10 DYSPHAGIA, UNSPECIFIED TYPE: ICD-10-CM

## 2023-01-25 PROCEDURE — 74176 CT ABD & PELVIS W/O CONTRAST: CPT | Performed by: UROLOGY

## 2023-01-25 PROCEDURE — 96523 IRRIG DRUG DELIVERY DEVICE: CPT

## 2023-01-25 PROCEDURE — 99214 OFFICE O/P EST MOD 30 MIN: CPT | Performed by: INTERNAL MEDICINE

## 2023-01-25 NOTE — PROGRESS NOTES
Education Record    Learner:  Patient and Spouse    Disease / Diagnosis: Here for labs and MD appointment    Barriers / Limitations:  None   Comments:    Method:  Brief focused and Discussion   Comments:    General Topics:  Plan of care reviewed   Comments:    Outcome:  Shows understanding   Comments:    Labs done on 1/13/23. Per Radha Phillips, Dr Goodwin's RN, no other labs to be done today. PAC flushed per protocol. Pt and wife reminded on needing PAC flushed every 6-12 weeks. Wife states pt sees MD and gets labs every 3 months. Discharged to MD appointment in stable condition, no new complaints.

## 2023-01-25 NOTE — PATIENT INSTRUCTIONS
To Do: Schedule and complete PET scan now  Schedule and complete video swallow evaluation  Schedule the pelvic floor therapy as ordered by Dr. Lenin Li for urine issues  Follow up with primary care doctor or neurologist for neuro testing for short term memory loss. Get a new covid-19 vaccine booster at the end of March.

## 2023-01-26 PROBLEM — R13.10 DYSPHAGIA: Status: ACTIVE | Noted: 2023-01-26

## 2023-02-03 ENCOUNTER — TELEPHONE (OUTPATIENT)
Dept: FAMILY MEDICINE CLINIC | Facility: CLINIC | Age: 83
End: 2023-02-03

## 2023-02-07 ENCOUNTER — HOSPITAL ENCOUNTER (OUTPATIENT)
Dept: NUCLEAR MEDICINE | Facility: HOSPITAL | Age: 83
Discharge: HOME | End: 2023-02-07
Attending: INTERNAL MEDICINE
Payer: MEDICARE

## 2023-02-07 DIAGNOSIS — C84.48 PERIPHERAL T-CELL LYMPHOMA OF LYMPH NODES OF MULTIPLE REGIONS (HCC): ICD-10-CM

## 2023-02-07 LAB — GLUCOSE BLD-MCNC: 91 MG/DL (ref 70–99)

## 2023-02-07 PROCEDURE — 78815 PET IMAGE W/CT SKULL-THIGH: CPT | Performed by: INTERNAL MEDICINE

## 2023-02-07 PROCEDURE — 82962 GLUCOSE BLOOD TEST: CPT

## 2023-02-08 ENCOUNTER — TELEPHONE (OUTPATIENT)
Dept: HEMATOLOGY/ONCOLOGY | Facility: HOSPITAL | Age: 83
End: 2023-02-08

## 2023-02-08 DIAGNOSIS — C84.48 PERIPHERAL T-CELL LYMPHOMA OF LYMPH NODES OF MULTIPLE REGIONS (HCC): Primary | ICD-10-CM

## 2023-02-08 NOTE — TELEPHONE ENCOUNTER
PET scan imaging reviewed with radiology, Dr. Ingrid Mtz are multiple areas of increased FDG uptake though does not appear to localize to any particular structure and therefore he thinks they likely represent artifact. I called and discussed with patient's wife, other than him feeling more anxious with a home renovation, he does not have any new symptoms or pains. I recommend he see me back in clinic in 3 months with a repeat PET/CT scan right before to reassess. His wife will discuss further with him and is in agreement with this recommendation. If he develops any new aches or pains or other clinical signs of recurrence would repeat imaging sooner.     Ac Abarca MD  Hematology/Medical Oncology  [facility] ([place])

## 2023-02-17 ENCOUNTER — OFFICE VISIT (OUTPATIENT)
Dept: PHYSICAL THERAPY | Age: 83
End: 2023-02-17
Attending: UROLOGY
Payer: MEDICARE

## 2023-02-17 DIAGNOSIS — N39.3 STRESS INCONTINENCE: ICD-10-CM

## 2023-02-17 PROCEDURE — 97163 PT EVAL HIGH COMPLEX 45 MIN: CPT

## 2023-02-17 PROCEDURE — 97112 NEUROMUSCULAR REEDUCATION: CPT

## 2023-02-17 PROCEDURE — 97530 THERAPEUTIC ACTIVITIES: CPT

## 2023-02-18 ENCOUNTER — OFFICE VISIT (OUTPATIENT)
Dept: FAMILY MEDICINE CLINIC | Facility: CLINIC | Age: 83
End: 2023-02-18
Payer: MEDICARE

## 2023-02-18 VITALS
DIASTOLIC BLOOD PRESSURE: 70 MMHG | SYSTOLIC BLOOD PRESSURE: 120 MMHG | HEART RATE: 76 BPM | OXYGEN SATURATION: 96 % | TEMPERATURE: 98 F | BODY MASS INDEX: 24 KG/M2 | WEIGHT: 133 LBS

## 2023-02-18 DIAGNOSIS — D84.9 IMMUNOCOMPROMISED (HCC): ICD-10-CM

## 2023-02-18 DIAGNOSIS — E44.1 MILD PROTEIN-CALORIE MALNUTRITION (HCC): ICD-10-CM

## 2023-02-18 DIAGNOSIS — D69.6 THROMBOCYTOPENIA (HCC): ICD-10-CM

## 2023-02-18 DIAGNOSIS — I82.432 ACUTE DEEP VEIN THROMBOSIS (DVT) OF POPLITEAL VEIN OF LEFT LOWER EXTREMITY (HCC): ICD-10-CM

## 2023-02-18 DIAGNOSIS — M79.672 LEFT FOOT PAIN: Primary | ICD-10-CM

## 2023-02-18 DIAGNOSIS — R59.0 AXILLARY LYMPHADENOPATHY: ICD-10-CM

## 2023-02-18 DIAGNOSIS — R23.3 EASY BRUISABILITY: ICD-10-CM

## 2023-02-18 PROCEDURE — 99214 OFFICE O/P EST MOD 30 MIN: CPT | Performed by: FAMILY MEDICINE

## 2023-02-20 ENCOUNTER — APPOINTMENT (OUTPATIENT)
Dept: DERMATOLOGY | Age: 83
End: 2023-02-20

## 2023-02-21 ENCOUNTER — OFFICE VISIT (OUTPATIENT)
Dept: PHYSICAL THERAPY | Age: 83
End: 2023-02-21
Attending: UROLOGY
Payer: MEDICARE

## 2023-02-21 PROCEDURE — 97112 NEUROMUSCULAR REEDUCATION: CPT

## 2023-02-25 ENCOUNTER — LAB ENCOUNTER (OUTPATIENT)
Dept: LAB | Age: 83
End: 2023-02-25
Attending: INTERNAL MEDICINE
Payer: MEDICARE

## 2023-02-25 DIAGNOSIS — R13.10 DYSPHAGIA, UNSPECIFIED TYPE: ICD-10-CM

## 2023-02-26 LAB — SARS-COV-2 RNA RESP QL NAA+PROBE: NOT DETECTED

## 2023-02-27 ENCOUNTER — TELEPHONE (OUTPATIENT)
Dept: PHYSICAL THERAPY | Facility: HOSPITAL | Age: 83
End: 2023-02-27

## 2023-02-28 ENCOUNTER — HOSPITAL ENCOUNTER (OUTPATIENT)
Dept: GENERAL RADIOLOGY | Facility: HOSPITAL | Age: 83
Discharge: HOME | End: 2023-02-28
Attending: INTERNAL MEDICINE
Payer: MEDICARE

## 2023-02-28 ENCOUNTER — TELEPHONE (OUTPATIENT)
Dept: PHYSICAL THERAPY | Facility: HOSPITAL | Age: 83
End: 2023-02-28

## 2023-02-28 DIAGNOSIS — R13.10 DYSPHAGIA, UNSPECIFIED TYPE: ICD-10-CM

## 2023-02-28 PROCEDURE — 92611 MOTION FLUOROSCOPY/SWALLOW: CPT

## 2023-02-28 PROCEDURE — 74230 X-RAY XM SWLNG FUNCJ C+: CPT | Performed by: INTERNAL MEDICINE

## 2023-03-01 ENCOUNTER — APPOINTMENT (OUTPATIENT)
Dept: PHYSICAL THERAPY | Age: 83
End: 2023-03-01
Attending: UROLOGY
Payer: MEDICARE

## 2023-03-02 ENCOUNTER — OFFICE VISIT (OUTPATIENT)
Dept: PHYSICAL THERAPY | Age: 83
End: 2023-03-02
Attending: UROLOGY
Payer: MEDICARE

## 2023-03-02 PROCEDURE — 97112 NEUROMUSCULAR REEDUCATION: CPT

## 2023-03-06 ENCOUNTER — OFFICE VISIT (OUTPATIENT)
Dept: DERMATOLOGY | Age: 83
End: 2023-03-06

## 2023-03-06 DIAGNOSIS — L57.0 ACTINIC KERATOSES: Primary | ICD-10-CM

## 2023-03-06 PROCEDURE — 17003 DESTRUCT PREMALG LES 2-14: CPT | Performed by: DERMATOLOGY

## 2023-03-06 PROCEDURE — 17000 DESTRUCT PREMALG LESION: CPT | Performed by: DERMATOLOGY

## 2023-03-07 ENCOUNTER — OFFICE VISIT (OUTPATIENT)
Dept: PHYSICAL THERAPY | Age: 83
End: 2023-03-07
Attending: UROLOGY
Payer: MEDICARE

## 2023-03-07 PROCEDURE — 97140 MANUAL THERAPY 1/> REGIONS: CPT

## 2023-03-07 PROCEDURE — 97112 NEUROMUSCULAR REEDUCATION: CPT

## 2023-03-21 ENCOUNTER — OFFICE VISIT (OUTPATIENT)
Dept: PHYSICAL THERAPY | Age: 83
End: 2023-03-21
Attending: UROLOGY
Payer: MEDICARE

## 2023-03-21 PROCEDURE — 97140 MANUAL THERAPY 1/> REGIONS: CPT

## 2023-03-21 PROCEDURE — 97112 NEUROMUSCULAR REEDUCATION: CPT

## 2023-03-28 ENCOUNTER — OFFICE VISIT (OUTPATIENT)
Dept: PHYSICAL THERAPY | Age: 83
End: 2023-03-28
Attending: UROLOGY
Payer: MEDICARE

## 2023-03-28 PROCEDURE — 97112 NEUROMUSCULAR REEDUCATION: CPT

## 2023-03-28 PROCEDURE — 97140 MANUAL THERAPY 1/> REGIONS: CPT

## 2023-03-31 ENCOUNTER — APPOINTMENT (OUTPATIENT)
Dept: HEMATOLOGY/ONCOLOGY | Facility: HOSPITAL | Age: 83
End: 2023-03-31
Attending: INTERNAL MEDICINE
Payer: MEDICARE

## 2023-04-04 ENCOUNTER — OFFICE VISIT (OUTPATIENT)
Dept: PHYSICAL THERAPY | Age: 83
End: 2023-04-04
Attending: UROLOGY
Payer: MEDICARE

## 2023-04-04 PROCEDURE — 97112 NEUROMUSCULAR REEDUCATION: CPT

## 2023-04-04 PROCEDURE — 97140 MANUAL THERAPY 1/> REGIONS: CPT

## 2023-04-10 DIAGNOSIS — I10 ESSENTIAL HYPERTENSION: ICD-10-CM

## 2023-04-10 RX ORDER — AMLODIPINE BESYLATE 5 MG/1
5 TABLET ORAL DAILY
Qty: 90 TABLET | Refills: 0 | Status: SHIPPED | OUTPATIENT
Start: 2023-04-10

## 2023-04-10 RX ORDER — ESCITALOPRAM OXALATE 5 MG/1
5 TABLET ORAL DAILY
Qty: 90 TABLET | Refills: 0 | Status: SHIPPED | OUTPATIENT
Start: 2023-04-10

## 2023-04-11 ENCOUNTER — OFFICE VISIT (OUTPATIENT)
Dept: PHYSICAL THERAPY | Age: 83
End: 2023-04-11
Attending: UROLOGY
Payer: MEDICARE

## 2023-04-11 DIAGNOSIS — I10 ESSENTIAL HYPERTENSION: ICD-10-CM

## 2023-04-11 PROCEDURE — 97112 NEUROMUSCULAR REEDUCATION: CPT

## 2023-04-12 RX ORDER — AMLODIPINE BESYLATE 5 MG/1
5 TABLET ORAL DAILY
Qty: 90 TABLET | Refills: 0 | OUTPATIENT
Start: 2023-04-12

## 2023-04-15 ENCOUNTER — OFFICE VISIT (OUTPATIENT)
Dept: FAMILY MEDICINE CLINIC | Facility: CLINIC | Age: 83
End: 2023-04-15
Payer: MEDICARE

## 2023-04-15 VITALS
BODY MASS INDEX: 22.06 KG/M2 | WEIGHT: 134 LBS | HEART RATE: 68 BPM | HEIGHT: 65.5 IN | SYSTOLIC BLOOD PRESSURE: 130 MMHG | DIASTOLIC BLOOD PRESSURE: 60 MMHG | TEMPERATURE: 98 F | OXYGEN SATURATION: 98 %

## 2023-04-15 DIAGNOSIS — E44.1 MILD PROTEIN-CALORIE MALNUTRITION (HCC): ICD-10-CM

## 2023-04-15 DIAGNOSIS — I10 ESSENTIAL HYPERTENSION: ICD-10-CM

## 2023-04-15 DIAGNOSIS — Z90.79 S/P PROSTATECTOMY: ICD-10-CM

## 2023-04-15 DIAGNOSIS — D84.9 IMMUNOCOMPROMISED (HCC): ICD-10-CM

## 2023-04-15 DIAGNOSIS — D69.6 THROMBOCYTOPENIA (HCC): ICD-10-CM

## 2023-04-15 DIAGNOSIS — C84.48 PERIPHERAL T-CELL LYMPHOMA OF LYMPH NODES OF MULTIPLE REGIONS (HCC): ICD-10-CM

## 2023-04-15 DIAGNOSIS — Z00.00 ENCOUNTER FOR ANNUAL HEALTH EXAMINATION: Primary | ICD-10-CM

## 2023-04-15 DIAGNOSIS — K59.01 SLOW TRANSIT CONSTIPATION: ICD-10-CM

## 2023-04-15 LAB
ALBUMIN SERPL-MCNC: 3.5 G/DL (ref 3.4–5)
ALBUMIN/GLOB SERPL: 1.4 {RATIO} (ref 1–2)
ALP LIVER SERPL-CCNC: 98 U/L
ALT SERPL-CCNC: 20 U/L
ANION GAP SERPL CALC-SCNC: 4 MMOL/L (ref 0–18)
AST SERPL-CCNC: 16 U/L (ref 15–37)
BASOPHILS # BLD AUTO: 0.04 X10(3) UL (ref 0–0.2)
BASOPHILS NFR BLD AUTO: 0.8 %
BILIRUB SERPL-MCNC: 0.6 MG/DL (ref 0.1–2)
BUN BLD-MCNC: 17 MG/DL (ref 7–18)
CALCIUM BLD-MCNC: 8.8 MG/DL (ref 8.5–10.1)
CHLORIDE SERPL-SCNC: 111 MMOL/L (ref 98–112)
CO2 SERPL-SCNC: 28 MMOL/L (ref 21–32)
CREAT BLD-MCNC: 0.78 MG/DL
EOSINOPHIL # BLD AUTO: 0.1 X10(3) UL (ref 0–0.7)
EOSINOPHIL NFR BLD AUTO: 2 %
ERYTHROCYTE [DISTWIDTH] IN BLOOD BY AUTOMATED COUNT: 13 %
FASTING STATUS PATIENT QL REPORTED: YES
GFR SERPLBLD BASED ON 1.73 SQ M-ARVRAT: 90 ML/MIN/1.73M2 (ref 60–?)
GLOBULIN PLAS-MCNC: 2.5 G/DL (ref 2.8–4.4)
GLUCOSE BLD-MCNC: 91 MG/DL (ref 70–99)
HCT VFR BLD AUTO: 43.1 %
HGB BLD-MCNC: 14.7 G/DL
IMM GRANULOCYTES # BLD AUTO: 0.01 X10(3) UL (ref 0–1)
IMM GRANULOCYTES NFR BLD: 0.2 %
LDH SERPL L TO P-CCNC: 182 U/L
LYMPHOCYTES # BLD AUTO: 0.65 X10(3) UL (ref 1–4)
LYMPHOCYTES NFR BLD AUTO: 12.7 %
MCH RBC QN AUTO: 33 PG (ref 26–34)
MCHC RBC AUTO-ENTMCNC: 34.1 G/DL (ref 31–37)
MCV RBC AUTO: 96.9 FL
MONOCYTES # BLD AUTO: 0.42 X10(3) UL (ref 0.1–1)
MONOCYTES NFR BLD AUTO: 8.2 %
NEUTROPHILS # BLD AUTO: 3.9 X10 (3) UL (ref 1.5–7.7)
NEUTROPHILS # BLD AUTO: 3.9 X10(3) UL (ref 1.5–7.7)
NEUTROPHILS NFR BLD AUTO: 76.1 %
OSMOLALITY SERPL CALC.SUM OF ELEC: 297 MOSM/KG (ref 275–295)
PLATELET # BLD AUTO: 166 10(3)UL (ref 150–450)
POTASSIUM SERPL-SCNC: 3.9 MMOL/L (ref 3.5–5.1)
PROT SERPL-MCNC: 6 G/DL (ref 6.4–8.2)
RBC # BLD AUTO: 4.45 X10(6)UL
SODIUM SERPL-SCNC: 143 MMOL/L (ref 136–145)
WBC # BLD AUTO: 5.1 X10(3) UL (ref 4–11)

## 2023-04-15 PROCEDURE — 85025 COMPLETE CBC W/AUTO DIFF WBC: CPT | Performed by: INTERNAL MEDICINE

## 2023-04-15 PROCEDURE — 80053 COMPREHEN METABOLIC PANEL: CPT | Performed by: INTERNAL MEDICINE

## 2023-04-15 PROCEDURE — 83615 LACTATE (LD) (LDH) ENZYME: CPT | Performed by: INTERNAL MEDICINE

## 2023-04-15 RX ORDER — AMLODIPINE BESYLATE 5 MG/1
5 TABLET ORAL DAILY
Qty: 90 TABLET | Refills: 1 | Status: SHIPPED | OUTPATIENT
Start: 2023-04-15

## 2023-04-18 ENCOUNTER — OFFICE VISIT (OUTPATIENT)
Dept: PHYSICAL THERAPY | Age: 83
End: 2023-04-18
Attending: UROLOGY
Payer: MEDICARE

## 2023-04-18 PROCEDURE — 97112 NEUROMUSCULAR REEDUCATION: CPT

## 2023-04-20 ENCOUNTER — APPOINTMENT (OUTPATIENT)
Dept: HEMATOLOGY/ONCOLOGY | Facility: HOSPITAL | Age: 83
End: 2023-04-20
Attending: INTERNAL MEDICINE
Payer: MEDICARE

## 2023-04-25 ENCOUNTER — OFFICE VISIT (OUTPATIENT)
Dept: PHYSICAL THERAPY | Age: 83
End: 2023-04-25
Attending: UROLOGY
Payer: MEDICARE

## 2023-04-25 PROCEDURE — 97112 NEUROMUSCULAR REEDUCATION: CPT

## 2023-05-10 ENCOUNTER — APPOINTMENT (OUTPATIENT)
Dept: HEMATOLOGY/ONCOLOGY | Facility: HOSPITAL | Age: 83
End: 2023-05-10
Attending: INTERNAL MEDICINE
Payer: MEDICARE

## 2023-05-17 ENCOUNTER — HOSPITAL ENCOUNTER (OUTPATIENT)
Dept: NUCLEAR MEDICINE | Facility: HOSPITAL | Age: 83
Discharge: HOME | End: 2023-05-17
Attending: INTERNAL MEDICINE
Payer: MEDICARE

## 2023-05-17 DIAGNOSIS — C84.48 PERIPHERAL T-CELL LYMPHOMA OF LYMPH NODES OF MULTIPLE REGIONS (HCC): ICD-10-CM

## 2023-05-17 LAB — GLUCOSE BLD-MCNC: 97 MG/DL (ref 70–99)

## 2023-05-17 PROCEDURE — 78815 PET IMAGE W/CT SKULL-THIGH: CPT | Performed by: INTERNAL MEDICINE

## 2023-05-17 PROCEDURE — 82962 GLUCOSE BLOOD TEST: CPT

## 2023-05-18 ENCOUNTER — NURSE ONLY (OUTPATIENT)
Dept: HEMATOLOGY/ONCOLOGY | Facility: HOSPITAL | Age: 83
End: 2023-05-18
Attending: INTERNAL MEDICINE
Payer: MEDICARE

## 2023-05-18 VITALS
DIASTOLIC BLOOD PRESSURE: 75 MMHG | OXYGEN SATURATION: 98 % | WEIGHT: 126.81 LBS | HEIGHT: 62.01 IN | RESPIRATION RATE: 18 BRPM | HEART RATE: 63 BPM | SYSTOLIC BLOOD PRESSURE: 122 MMHG | TEMPERATURE: 98 F | BODY MASS INDEX: 23.04 KG/M2

## 2023-05-18 DIAGNOSIS — C84.48 PERIPHERAL T-CELL LYMPHOMA OF LYMPH NODES OF MULTIPLE REGIONS (HCC): Primary | ICD-10-CM

## 2023-05-18 DIAGNOSIS — Z86.718 HISTORY OF DVT (DEEP VEIN THROMBOSIS): ICD-10-CM

## 2023-05-18 DIAGNOSIS — D72.810 LYMPHOPENIA: ICD-10-CM

## 2023-05-18 DIAGNOSIS — R19.8 ALTERNATING CONSTIPATION AND DIARRHEA: ICD-10-CM

## 2023-05-18 DIAGNOSIS — R26.81 UNSTABLE GAIT: ICD-10-CM

## 2023-05-18 PROCEDURE — 36591 DRAW BLOOD OFF VENOUS DEVICE: CPT

## 2023-05-18 PROCEDURE — 99215 OFFICE O/P EST HI 40 MIN: CPT | Performed by: INTERNAL MEDICINE

## 2023-06-15 NOTE — TELEPHONE ENCOUNTER
Patient spoke to Dr Rima Vaz about a half hour ago and he told him what type of cancer it was but patient did not write it down, also is he going to have surgery and would that be without a PET scan Physical Therapy   Daily Treatment     Patient Name: Chava Bray  Age:  88 y.o., Sex:  female  Medical Record #: [id number]  Today's Date: 6/15/2023     Precautions  Precautions: Fall Risk, Posterior Hip Precautions, Weight Bearing As Tolerated Left Lower Extremity  Comments: hx dementia, impaired safety awareness    Subjective    Pt was seated in w/c upon arrival and agreeable to treatment.      Objective       06/15/23 0931   PT Charge Group   PT Therapeutic Activities (Units) 2   PT Total Time Spent   PT Individual Total Time Spent (Mins) 30   Precautions   Precautions Fall Risk;Posterior Hip Precautions;Weight Bearing As Tolerated Left Lower Extremity   Transfer Functional Level of Assist   Bed, Chair, Wheelchair Transfer Contact Guard Assist   Bed Chair Wheelchair Transfer Description Adaptive equipment;Set-up of equipment;Supervision for safety;Verbal cueing   Toilet Transfers Contact Guard Assist   Toilet Transfer Description Grab bar;Supervision for safety;Verbal cueing   Bed Mobility    Sit to Supine Moderate Assist   Sit to Stand Contact Guard Assist   Scooting Contact Guard Assist   Interdisciplinary Plan of Care Collaboration   Patient Position at End of Therapy In Bed;Bed Alarm On;Call Light within Reach;Tray Table within Reach;Phone within Reach     Pt able to recall 2/3 hip precautions when asked at beginning of session (as she had just reviewed them with primary PT in adjacent prior therapy session). Completed standing tolerance in // bars x 2-3 min with UE support and no UE support (~5-10 sec).  Transfer training completed to bed and toilet with focus on sequencing and precaution adherence.     Assessment    Pt demonstrated improvement in maintaining precautions this session; however, carry over is likely limited.  Pt pleasant and motivated to return home.   Strengths: Independent prior level of function, Pleasant and cooperative  Barriers: Constipation, Decreased endurance, Fatigue,  Generalized weakness, Impaired activity tolerance, Impaired balance, Impaired carryover of learning, Impaired insight/denial of deficits, Impaired functional cognition, Limited mobility, Pain    Plan    Gait with FWW, balance/ strength training as able, bed mobility, hip precaution recall. New cushion or solid bottom for chair to improve position??     Passport items to be completed:  Get in/out of bed safely, in/out of a vehicle, safely use mobility device, walk or wheel around home/community, navigate up and down stairs, show how to get up/down from the ground, ensure home is accessible, demonstrate HEP, complete caregiver training    Physical Therapy Problems (Active)       Problem: Balance       Dates: Start:  06/13/23         Goal: STG-Within one week, patient will improve standing balance to tolerate static standing x 1 min on firm surface with contact guard assist while performing reaching task       Dates: Start:  06/13/23               Problem: Mobility       Dates: Start:  06/13/23         Goal: STG-Within one week, patient will ambulate 100 ft with FWW and contact guard assist       Dates: Start:  06/13/23            Goal: STG-Within one week, patient will ambulate up/down a 6 inch curb with FWW and min assist       Dates: Start:  06/13/23            Goal: STG-Within one week, patient will ascend and descend four to six stairs with bilateral hand rails and contact guard assist       Dates: Start:  06/13/23               Problem: Mobility Transfers       Dates: Start:  06/13/23         Goal: STG-Within one week, patient will perform bed mobility from flat bed with railing with contact guard assist       Dates: Start:  06/13/23            Goal: STG-Within one week, patient will sit to stand from edge of bed with contact guard assist       Dates: Start:  06/13/23            Goal: STG-Within one week, patient will teach back fall recovery with minimal cuing (limited ability to actually perform due to  posterior hip precautions)       Dates: Start:  06/13/23               Problem: PT-Long Term Goals       Dates: Start:  06/13/23         Goal: LTG-By discharge, patient will transfer one surface to another with FWW and supervision assist       Dates: Start:  06/13/23            Goal: LTG-By discharge, patient will perform home exercise program from handouts with set up assist       Dates: Start:  06/13/23            Goal: LTG-By discharge, patient will ambulate up/down flight of stairs with bilateral railing and supervision assist       Dates: Start:  06/13/23            Goal: LTG-By discharge, patient will transfer in/out of a car with supervision assist and FWW       Dates: Start:  06/13/23            Goal: LTG-By discharge, patient will teach back hip precautions 100% with no cuing       Dates: Start:  06/13/23

## 2023-06-23 ENCOUNTER — TELEPHONE (OUTPATIENT)
Dept: NEUROLOGY | Facility: CLINIC | Age: 83
End: 2023-06-23

## 2023-06-23 NOTE — TELEPHONE ENCOUNTER
Rcvd imaging report;MRI Brain w w/o contrast;Dos-6/23/23; From [facility]; Endorsed to provider for review

## 2023-06-28 DIAGNOSIS — I10 ESSENTIAL HYPERTENSION: ICD-10-CM

## 2023-06-28 RX ORDER — AMLODIPINE BESYLATE 5 MG/1
5 TABLET ORAL DAILY
Qty: 90 TABLET | Refills: 0 | Status: SHIPPED | OUTPATIENT
Start: 2023-06-28

## 2023-06-29 ENCOUNTER — TELEPHONE (OUTPATIENT)
Dept: NEUROLOGY | Facility: CLINIC | Age: 83
End: 2023-06-29

## 2023-06-30 ENCOUNTER — LAB ENCOUNTER (OUTPATIENT)
Dept: LAB | Age: 83
End: 2023-06-30
Attending: Other
Payer: MEDICARE

## 2023-06-30 ENCOUNTER — OFFICE VISIT (OUTPATIENT)
Dept: NEUROLOGY | Facility: CLINIC | Age: 83
End: 2023-06-30
Payer: MEDICARE

## 2023-06-30 VITALS
WEIGHT: 134 LBS | SYSTOLIC BLOOD PRESSURE: 116 MMHG | RESPIRATION RATE: 16 BRPM | DIASTOLIC BLOOD PRESSURE: 72 MMHG | HEIGHT: 62.01 IN | HEART RATE: 75 BPM | BODY MASS INDEX: 24.35 KG/M2

## 2023-06-30 DIAGNOSIS — R42 DIZZINESS: ICD-10-CM

## 2023-06-30 DIAGNOSIS — R26.89 ABNORMALITY OF GAIT DUE TO IMPAIRMENT OF BALANCE: ICD-10-CM

## 2023-06-30 DIAGNOSIS — F03.A0 MILD DEMENTIA WITHOUT BEHAVIORAL DISTURBANCE, PSYCHOTIC DISTURBANCE, MOOD DISTURBANCE, OR ANXIETY, UNSPECIFIED DEMENTIA TYPE (HCC): Primary | ICD-10-CM

## 2023-06-30 DIAGNOSIS — F03.A0 MILD DEMENTIA WITHOUT BEHAVIORAL DISTURBANCE, PSYCHOTIC DISTURBANCE, MOOD DISTURBANCE, OR ANXIETY, UNSPECIFIED DEMENTIA TYPE (HCC): ICD-10-CM

## 2023-06-30 PROBLEM — G30.9 ALZHEIMER'S DISEASE, UNSPECIFIED (HCC): Status: ACTIVE | Noted: 2023-06-30

## 2023-06-30 PROBLEM — F02.80 ALZHEIMER'S DISEASE, UNSPECIFIED (HCC): Status: ACTIVE | Noted: 2023-06-30

## 2023-06-30 LAB
T4 FREE SERPL-MCNC: 0.9 NG/DL (ref 0.8–1.7)
TSI SER-ACNC: 5.56 MIU/ML (ref 0.36–3.74)
VIT B12 SERPL-MCNC: 285 PG/ML (ref 193–986)

## 2023-06-30 PROCEDURE — 84439 ASSAY OF FREE THYROXINE: CPT

## 2023-06-30 PROCEDURE — 82607 VITAMIN B-12: CPT

## 2023-06-30 PROCEDURE — 99204 OFFICE O/P NEW MOD 45 MIN: CPT | Performed by: OTHER

## 2023-06-30 PROCEDURE — 84443 ASSAY THYROID STIM HORMONE: CPT

## 2023-06-30 PROCEDURE — 36415 COLL VENOUS BLD VENIPUNCTURE: CPT

## 2023-06-30 RX ORDER — DONEPEZIL HYDROCHLORIDE 10 MG/1
10 TABLET, FILM COATED ORAL NIGHTLY
Qty: 30 TABLET | Refills: 5 | Status: SHIPPED | OUTPATIENT
Start: 2023-06-30

## 2023-08-11 ENCOUNTER — HOSPITAL ENCOUNTER (OUTPATIENT)
Dept: MRI IMAGING | Facility: HOSPITAL | Age: 83
Discharge: HOME | End: 2023-08-11
Attending: Other
Payer: MEDICARE

## 2023-08-11 DIAGNOSIS — F03.A0 MILD DEMENTIA WITHOUT BEHAVIORAL DISTURBANCE, PSYCHOTIC DISTURBANCE, MOOD DISTURBANCE, OR ANXIETY, UNSPECIFIED DEMENTIA TYPE (HCC): ICD-10-CM

## 2023-08-11 PROCEDURE — 70551 MRI BRAIN STEM W/O DYE: CPT | Performed by: OTHER

## 2023-08-17 ENCOUNTER — OFFICE VISIT (OUTPATIENT)
Dept: HEMATOLOGY/ONCOLOGY | Facility: HOSPITAL | Age: 83
End: 2023-08-17
Attending: INTERNAL MEDICINE
Payer: MEDICARE

## 2023-08-17 VITALS
TEMPERATURE: 98 F | RESPIRATION RATE: 18 BRPM | OXYGEN SATURATION: 96 % | BODY MASS INDEX: 23 KG/M2 | SYSTOLIC BLOOD PRESSURE: 127 MMHG | WEIGHT: 125.5 LBS | HEART RATE: 70 BPM | DIASTOLIC BLOOD PRESSURE: 72 MMHG

## 2023-08-17 DIAGNOSIS — R26.81 UNSTABLE GAIT: ICD-10-CM

## 2023-08-17 DIAGNOSIS — F02.80 ALZHEIMER'S DISEASE, UNSPECIFIED (HCC): ICD-10-CM

## 2023-08-17 DIAGNOSIS — C84.48 PERIPHERAL T-CELL LYMPHOMA OF LYMPH NODES OF MULTIPLE REGIONS (HCC): ICD-10-CM

## 2023-08-17 DIAGNOSIS — C84.48 PERIPHERAL T-CELL LYMPHOMA OF LYMPH NODES OF MULTIPLE REGIONS (HCC): Primary | ICD-10-CM

## 2023-08-17 DIAGNOSIS — Z86.718 HISTORY OF DVT (DEEP VEIN THROMBOSIS): ICD-10-CM

## 2023-08-17 DIAGNOSIS — G30.9 ALZHEIMER'S DISEASE, UNSPECIFIED (HCC): ICD-10-CM

## 2023-08-17 LAB
ALBUMIN SERPL-MCNC: 3.6 G/DL (ref 3.4–5)
ALBUMIN/GLOB SERPL: 1.3 {RATIO} (ref 1–2)
ALP LIVER SERPL-CCNC: 91 U/L
ALT SERPL-CCNC: 19 U/L
ANION GAP SERPL CALC-SCNC: 4 MMOL/L (ref 0–18)
AST SERPL-CCNC: 15 U/L (ref 15–37)
BASOPHILS # BLD AUTO: 0.05 X10(3) UL (ref 0–0.2)
BASOPHILS NFR BLD AUTO: 1.1 %
BILIRUB SERPL-MCNC: 1.1 MG/DL (ref 0.1–2)
BUN BLD-MCNC: 18 MG/DL (ref 7–18)
CALCIUM BLD-MCNC: 8.8 MG/DL (ref 8.5–10.1)
CHLORIDE SERPL-SCNC: 109 MMOL/L (ref 98–112)
CO2 SERPL-SCNC: 26 MMOL/L (ref 21–32)
CREAT BLD-MCNC: 0.96 MG/DL
EGFRCR SERPLBLD CKD-EPI 2021: 80 ML/MIN/1.73M2 (ref 60–?)
EOSINOPHIL # BLD AUTO: 0.07 X10(3) UL (ref 0–0.7)
EOSINOPHIL NFR BLD AUTO: 1.6 %
ERYTHROCYTE [DISTWIDTH] IN BLOOD BY AUTOMATED COUNT: 13.3 %
GLOBULIN PLAS-MCNC: 2.7 G/DL (ref 2.8–4.4)
GLUCOSE BLD-MCNC: 138 MG/DL (ref 70–99)
HCT VFR BLD AUTO: 43.4 %
HGB BLD-MCNC: 15.1 G/DL
IMM GRANULOCYTES # BLD AUTO: 0.01 X10(3) UL (ref 0–1)
IMM GRANULOCYTES NFR BLD: 0.2 %
LDH SERPL L TO P-CCNC: 181 U/L
LYMPHOCYTES # BLD AUTO: 0.51 X10(3) UL (ref 1–4)
LYMPHOCYTES NFR BLD AUTO: 11.6 %
MCH RBC QN AUTO: 33.2 PG (ref 26–34)
MCHC RBC AUTO-ENTMCNC: 34.8 G/DL (ref 31–37)
MCV RBC AUTO: 95.4 FL
MONOCYTES # BLD AUTO: 0.34 X10(3) UL (ref 0.1–1)
MONOCYTES NFR BLD AUTO: 7.7 %
NEUTROPHILS # BLD AUTO: 3.42 X10 (3) UL (ref 1.5–7.7)
NEUTROPHILS # BLD AUTO: 3.42 X10(3) UL (ref 1.5–7.7)
NEUTROPHILS NFR BLD AUTO: 77.8 %
OSMOLALITY SERPL CALC.SUM OF ELEC: 292 MOSM/KG (ref 275–295)
PLATELET # BLD AUTO: 157 10(3)UL (ref 150–450)
POTASSIUM SERPL-SCNC: 3.6 MMOL/L (ref 3.5–5.1)
PROT SERPL-MCNC: 6.3 G/DL (ref 6.4–8.2)
RBC # BLD AUTO: 4.55 X10(6)UL
SODIUM SERPL-SCNC: 139 MMOL/L (ref 136–145)
WBC # BLD AUTO: 4.4 X10(3) UL (ref 4–11)

## 2023-08-17 PROCEDURE — 83615 LACTATE (LD) (LDH) ENZYME: CPT

## 2023-08-17 PROCEDURE — 80053 COMPREHEN METABOLIC PANEL: CPT

## 2023-08-17 PROCEDURE — 36591 DRAW BLOOD OFF VENOUS DEVICE: CPT

## 2023-08-17 PROCEDURE — 99214 OFFICE O/P EST MOD 30 MIN: CPT | Performed by: INTERNAL MEDICINE

## 2023-08-17 PROCEDURE — 85025 COMPLETE CBC W/AUTO DIFF WBC: CPT

## 2023-08-17 NOTE — PROGRESS NOTES
Education Record    Learner:  Patient and Spouse    Disease / Diagnosis: here for MD and labs    Barriers / Limitations:  None   Comments:    Method:  Brief focused and Discussion   Comments:    General Topics:  Plan of care reviewed   Comments:    Outcome:  Shows understanding   Comments:    Pt arrives ambulatory. Denies complaints. Labs drawn via PAC. PAC flushed and decannulated per protocol. Reminded PAC needs to be flushed every 12 weeks. Wife states they come every 3 months for labs and MD and she will make next appointment after MD visit. Discharged to MD appointment in stable condition, no new complaints.

## 2023-08-17 NOTE — PROGRESS NOTES
Outpatient Oncology Care Plan  Problem list:  Knowledge deficit  Fatigue     Problems related to:    chemotherapy  disease/disease progression  side effect of treatment     Interventions:  provided general teaching     Expected outcomes:  understands plan of care     Progress towards outcome:  making progress     Education Record     Learner:  Patient  Barriers / Limitations:  None  Method:  Brief focused  Outcome:  Shows understanding  Comments: 3 month MD f/up T-cell lymphoma. Pt states he is feeling tired. Denies any updates to med or hx.

## 2023-08-18 PROBLEM — T80.90XA INFUSION REACTION: Status: RESOLVED | Noted: 2021-10-06 | Resolved: 2023-08-18

## 2023-08-22 ENCOUNTER — TELEPHONE (OUTPATIENT)
Dept: NEUROLOGY | Facility: CLINIC | Age: 83
End: 2023-08-22

## 2023-08-22 NOTE — TELEPHONE ENCOUNTER
Pt is calling to inform Dr. Frandy Sun that he has stopped taking his current rx donepezil 10 MG Oral Tab; Due to issues using the washroom, and causing him pain; Pt ask that if provider has any questions please give him a cb; Please advise

## 2023-08-22 NOTE — TELEPHONE ENCOUNTER
Pt calling to inform Babar Jacobson that he stopped taking Medication donepezil 10 MG Oral Tab a few weeks ago due to having nausea and diarrhea. Please call to discuss other medications .

## 2023-08-23 NOTE — TELEPHONE ENCOUNTER
We will discuss this on follow up with both patient and wife.    No urgency for medications'    Thanks

## 2023-08-23 NOTE — TELEPHONE ENCOUNTER
Pt following up on his call yesterday to discuss his options for other treatment. Please advise, Pt's best call back number is [phone]. Endorsed to [employer].

## 2023-09-05 ENCOUNTER — TELEPHONE (OUTPATIENT)
Dept: NEUROLOGY | Facility: CLINIC | Age: 83
End: 2023-09-05

## 2023-09-05 NOTE — TELEPHONE ENCOUNTER
Patient notified of MRI results. Georgi Rojo MD  8/14/2023  8:51 AM CDT       No acute abnormality.  Moderate chronic microvascular changes

## 2023-09-13 RX ORDER — ESCITALOPRAM OXALATE 5 MG/1
5 TABLET ORAL DAILY
Qty: 90 TABLET | Refills: 0 | Status: SHIPPED | OUTPATIENT
Start: 2023-09-13

## 2023-10-20 ENCOUNTER — OFFICE VISIT (OUTPATIENT)
Dept: NEUROLOGY | Facility: CLINIC | Age: 83
End: 2023-10-20
Payer: MEDICARE

## 2023-10-20 VITALS
SYSTOLIC BLOOD PRESSURE: 112 MMHG | RESPIRATION RATE: 16 BRPM | WEIGHT: 131 LBS | OXYGEN SATURATION: 97 % | BODY MASS INDEX: 24 KG/M2 | DIASTOLIC BLOOD PRESSURE: 64 MMHG | HEART RATE: 66 BPM

## 2023-10-20 DIAGNOSIS — F03.A0 MILD DEMENTIA WITHOUT BEHAVIORAL DISTURBANCE, PSYCHOTIC DISTURBANCE, MOOD DISTURBANCE, OR ANXIETY, UNSPECIFIED DEMENTIA TYPE (HCC): Primary | ICD-10-CM

## 2023-10-20 PROCEDURE — 99213 OFFICE O/P EST LOW 20 MIN: CPT | Performed by: OTHER

## 2023-10-20 RX ORDER — RIVASTIGMINE 4.6 MG/24H
1 PATCH, EXTENDED RELEASE TRANSDERMAL DAILY
Qty: 30 PATCH | Refills: 5 | Status: SHIPPED | OUTPATIENT
Start: 2023-10-20

## 2023-10-20 NOTE — PROGRESS NOTES
HPI:    Patient ID: Jame Lutz is a 82year old male. PCP: Dr Manda Arredondo    HPI    Patient presents here for follow-up for memory loss and to discuss the MRI results. He reports no change in his memory. States he tried donepezil but had some significant gastrointestinal side effects and had to discontinue donepezil. States he intermittently still continues to feel dizzy. He does not drink enough water due to increase urinary frequency due to prostate issues. Jose Elias Scott is a [de-identified] yo male with history of Prostate cancer 15 years ago, urinary incontinence, Lymphoma s/p chemotherapy, hypertension who presents for evaluation of dizziness. Patient reports dizziness and lightheadedness is intermittent, comes and goes and today he does not feel dizzy at all. He denies any vertigo. He was having some double vision looking to the left but not anymore. Wife reports his balance is poor and drifts to the right and has fallen in the past. He denies any tingling and numbness in feet. Had Chemo in 2021 for Lymphoma and cancer free for last 6 months. States recent PET scan was unremarkable  Wife states his memory is declining, getting more forgetful and confused. States during a hospital admission he got delirious and was seeing people. States no visual hallucinations at home. No family history of Dementia.      HISTORY:  Past Medical History:   Diagnosis Date    Abdominal hernia     Surgery in November (3 Total)    Acute deep vein thrombosis (DVT) of popliteal vein of left lower extremity ([facility] [address] 2/17/2022    Anxiety state     Asthma     in good control    Calculus of kidney     Cataract     Disorder of thyroid     Diverticulosis of large intestine     Essential hypertension     Hearing impairment     Hemorrhoids     No recent issues    High blood pressure     History of depression     Hyperlipidemia     Leaking of urine     Osteoarthritis     knees and hands    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 COLONOSCOPY  10/27/2020    DJP     HERNIA SURGERY      x2    LAPAROSCOPY, SURGICAL PROSTATECTOMY, RETROPUBIC RADICAL, W/NERVE SPARING  2008    OTHER  2021    Left inguinal lymph node biopsy    OTHER SURGICAL HISTORY  2020    VENTRAL INCISIONAL HERNIA REPAIR, PRIMARY, LEFT AXILLARY LYMPH NODE EXCISIONAL BIOPSY    REMOVAL GALLBLADDER      REPAIR ING HERNIA,5+Y/O,REDUCIBL      TONSILLECTOMY        Family History   Problem Relation Age of Onset    Cancer Mother 48        Brain    Colon Cancer Father 68    Cancer Brother 54        Mesothelioma      Social History     Socioeconomic History    Marital status:    Tobacco Use    Smoking status: Former     Packs/day: 0.50     Years: 14.00     Additional pack years: 0.00     Total pack years: 7.00     Types: Cigarettes     Start date: 1953     Quit date: 1967     Years since quittin.9    Smokeless tobacco: Never    Tobacco comments:     quit over 50 years ago   Vaping Use    Vaping Use: Never used   Substance and Sexual Activity    Alcohol use: Not Currently    Drug use: Never   Other Topics Concern    Caffeine Concern Yes     Comment: coffee half a cup    Exercise Yes     Comment: PT   Social History Narrative    . 6 adult children. Used to be an ,         Review of Systems   Constitutional: Negative. HENT: Negative. Eyes: Negative. Respiratory: Negative. Cardiovascular: Negative. Gastrointestinal: Negative. Endocrine: Negative. Genitourinary: Negative. Musculoskeletal:  Positive for gait problem. Skin: Negative. Allergic/Immunologic: Negative. Neurological:  Positive for dizziness. Hematological: Negative. Psychiatric/Behavioral:  Positive for decreased concentration. All other systems reviewed and are negative. Current Outpatient Medications   Medication Sig Dispense Refill    rivastigmine 4.6 MG/24HR Transdermal Patch 24 Hr Place 1 patch onto the skin daily. 30 patch 5    escitalopram 5 MG Oral Tab Take 1 tablet (5 mg total) by mouth daily. 90 tablet 0    amLODIPine 5 MG Oral Tab Take 1 tablet (5 mg total) by mouth daily. 90 tablet 0    fluorouracil 5 % External Cream Apply to the affected areas twice daily for four weeks; during use the areas will feel irritated and look red and swollen      Nutritional Supplements (JUICE PLUS FIBRE OR) Take 1 tablet by mouth daily. Diclofenac Sodium 1 % External Gel Apply 2 g topically daily as needed. triamcinolone acetonide 0.1 % External Ointment Apply 1 Application topically daily as needed. acetaminophen 500 MG Oral Tab Take 1 tablet (500 mg total) by mouth every 6 (six) hours as needed for Pain. Fluticasone Furoate (ARNUITY ELLIPTA IN) Inhale 1 puff into the lungs daily. Allergies:  Emend [Aprepitant]      HIVES    Comment:One single hive after completion of emend. Other                   OTHER (SEE COMMENTS)    Comment:watery and itchy eyes- CATS  Penicillins             RASH  Ragweed                 ITCHING, OTHER (SEE COMMENTS)    Comment:stuffiness  PHYSICAL EXAM:   Physical Exam  Blood pressure 112/64, pulse 66, resp. rate 16, weight 131 lb (59.4 kg), SpO2 97%. General Appearance: Well nourished, well developed, no apparent distress. HEENT: Normocephalic and atraumatic  Cardiovascular: Normal rate, regular rhythm and normal heart sounds. Pulmonary/Chest: Effort normal and breath sounds normal.   Abdominal: Soft.  Bowel sounds are normal.   Skin: dry, clean and intact  Ext: peripheral pulses present  Psych: normal mood and affect    Neurological:  Patient is awake, alert and oriented to person, place and time   Speech is fluent with intact comprehension  MOCA 21/30  Visuospatial skills/executive 1/5  Naming 3/3  Attention 3/3  Calculation 33  Language 2/3  Abstraction 2/2  Recall 1/5  Orientation 6/6    Cranial Nerves:   II: Visual fields: normal  III: Pupils: equal, round, reactive to light  III,IV,VI: Extra Ocular Movements: intact  V: Facial sensation: intact  VII: Facial strength: intact  VIII: Hearing: Impaired hearing  IX: Palate: intact  XI: Shoulder shrug: intact  XII: Tongue movement: normal    Motor: Normal tone. Strength is  5 out of 5 in all extremities bilaterally. Sensory: Sensory examination is normal to light touch and pinprick     Coordination: grossly intact    Gait: normal casual gait       TESTS/IMAGING:     MRI brain        Impression   CONCLUSION:       1. No evidence of an acute infarct. 2. Moderate FLAIR abnormalities in the white matter are likely sequelae of chronic small vessel ischemic disease. 3. Mild global parenchymal volume loss. ASSESSMENT/PLAN:     (F03.A0) Mild dementia without behavioral disturbance, psychotic disturbance, mood disturbance, or anxiety, unspecified dementia type ([facility] [address]  (primary encounter diagnosis)      Reviewed MRI results with the patient. There is moderate chronic microvascular changes and mild global volume loss seen. Suspecting mild vascular dementia      Intolerant to donepezil. Was switched to Exelon patch   Counseled on cognitive activities      Dizziness due to poor water intake and low blood pressure  Adequate water intake. Monitor BP may need to adjust his blood pressure medicine at home    See orders and medications filed with this encounter. The patient indicates understanding of these issues and agrees with the plan.           Gisel Chambers MD  [id number]      Meds This Visit:  Requested Prescriptions     Signed Prescriptions Disp Refills    rivastigmine 4.6 MG/24HR Transdermal Patch 24 Hr 30 patch 5     Sig: Place 1 patch onto the skin daily.        Imaging & Referrals:  None     #[id number]

## 2023-11-02 ENCOUNTER — OFFICE VISIT (OUTPATIENT)
Dept: DERMATOLOGY | Age: 83
End: 2023-11-02

## 2023-11-02 DIAGNOSIS — L57.0 ACTINIC KERATOSES: Primary | ICD-10-CM

## 2023-11-02 PROCEDURE — 17003 DESTRUCT PREMALG LES 2-14: CPT | Performed by: DERMATOLOGY

## 2023-11-02 PROCEDURE — 17000 DESTRUCT PREMALG LESION: CPT | Performed by: DERMATOLOGY

## 2023-11-06 ENCOUNTER — TELEPHONE (OUTPATIENT)
Dept: DERMATOLOGY | Age: 83
End: 2023-11-06

## 2023-11-06 RX ORDER — ERYTHROMYCIN 5 MG/G
OINTMENT OPHTHALMIC 2 TIMES DAILY
Qty: 3.5 G | Refills: 0 | Status: SHIPPED | OUTPATIENT
Start: 2023-11-06 | End: 2023-11-16

## 2023-11-13 ENCOUNTER — HOSPITAL ENCOUNTER (OUTPATIENT)
Dept: NUCLEAR MEDICINE | Facility: HOSPITAL | Age: 83
Discharge: HOME | End: 2023-11-13
Attending: INTERNAL MEDICINE
Payer: MEDICARE

## 2023-11-13 DIAGNOSIS — C84.48 PERIPHERAL T-CELL LYMPHOMA OF LYMPH NODES OF MULTIPLE REGIONS (HCC): ICD-10-CM

## 2023-11-13 LAB — GLUCOSE BLDC GLUCOMTR-MCNC: 91 MG/DL (ref 70–99)

## 2023-11-13 PROCEDURE — 78815 PET IMAGE W/CT SKULL-THIGH: CPT | Performed by: INTERNAL MEDICINE

## 2023-11-13 PROCEDURE — 82962 GLUCOSE BLOOD TEST: CPT

## 2023-11-16 ENCOUNTER — OFFICE VISIT (OUTPATIENT)
Dept: HEMATOLOGY/ONCOLOGY | Facility: HOSPITAL | Age: 83
End: 2023-11-16
Attending: INTERNAL MEDICINE
Payer: MEDICARE

## 2023-11-16 VITALS
BODY MASS INDEX: 23 KG/M2 | WEIGHT: 128.38 LBS | TEMPERATURE: 97 F | OXYGEN SATURATION: 95 % | DIASTOLIC BLOOD PRESSURE: 79 MMHG | RESPIRATION RATE: 16 BRPM | SYSTOLIC BLOOD PRESSURE: 131 MMHG | HEART RATE: 68 BPM

## 2023-11-16 DIAGNOSIS — Z86.718 HISTORY OF DVT (DEEP VEIN THROMBOSIS): ICD-10-CM

## 2023-11-16 DIAGNOSIS — D72.810 LYMPHOPENIA: ICD-10-CM

## 2023-11-16 DIAGNOSIS — R26.81 UNSTABLE GAIT: ICD-10-CM

## 2023-11-16 DIAGNOSIS — C84.48 PERIPHERAL T-CELL LYMPHOMA OF LYMPH NODES OF MULTIPLE REGIONS (HCC): ICD-10-CM

## 2023-11-16 DIAGNOSIS — C84.48 PERIPHERAL T-CELL LYMPHOMA OF LYMPH NODES OF MULTIPLE REGIONS (HCC): Primary | ICD-10-CM

## 2023-11-16 LAB
ALBUMIN SERPL-MCNC: 3.4 G/DL (ref 3.4–5)
ALBUMIN/GLOB SERPL: 1.2 {RATIO} (ref 1–2)
ALP LIVER SERPL-CCNC: 101 U/L
ALT SERPL-CCNC: 17 U/L
ANION GAP SERPL CALC-SCNC: 1 MMOL/L (ref 0–18)
AST SERPL-CCNC: 20 U/L (ref 15–37)
BASOPHILS # BLD AUTO: 0.05 X10(3) UL (ref 0–0.2)
BASOPHILS NFR BLD AUTO: 1.3 %
BILIRUB SERPL-MCNC: 0.9 MG/DL (ref 0.1–2)
BUN BLD-MCNC: 14 MG/DL (ref 9–23)
CALCIUM BLD-MCNC: 8.9 MG/DL (ref 8.5–10.1)
CHLORIDE SERPL-SCNC: 113 MMOL/L (ref 98–112)
CO2 SERPL-SCNC: 28 MMOL/L (ref 21–32)
CREAT BLD-MCNC: 0.93 MG/DL
EGFRCR SERPLBLD CKD-EPI 2021: 82 ML/MIN/1.73M2 (ref 60–?)
EOSINOPHIL # BLD AUTO: 0.15 X10(3) UL (ref 0–0.7)
EOSINOPHIL NFR BLD AUTO: 4 %
ERYTHROCYTE [DISTWIDTH] IN BLOOD BY AUTOMATED COUNT: 13.2 %
GLOBULIN PLAS-MCNC: 2.8 G/DL (ref 2.8–4.4)
GLUCOSE BLD-MCNC: 96 MG/DL (ref 70–99)
HCT VFR BLD AUTO: 40.3 %
HGB BLD-MCNC: 14.3 G/DL
IMM GRANULOCYTES # BLD AUTO: 0.01 X10(3) UL (ref 0–1)
IMM GRANULOCYTES NFR BLD: 0.3 %
LDH SERPL L TO P-CCNC: 213 U/L
LYMPHOCYTES # BLD AUTO: 0.46 X10(3) UL (ref 1–4)
LYMPHOCYTES NFR BLD AUTO: 12.2 %
MCH RBC QN AUTO: 33.1 PG (ref 26–34)
MCHC RBC AUTO-ENTMCNC: 35.5 G/DL (ref 31–37)
MCV RBC AUTO: 93.3 FL
MONOCYTES # BLD AUTO: 0.36 X10(3) UL (ref 0.1–1)
MONOCYTES NFR BLD AUTO: 9.5 %
NEUTROPHILS # BLD AUTO: 2.74 X10 (3) UL (ref 1.5–7.7)
NEUTROPHILS # BLD AUTO: 2.74 X10(3) UL (ref 1.5–7.7)
NEUTROPHILS NFR BLD AUTO: 72.7 %
OSMOLALITY SERPL CALC.SUM OF ELEC: 294 MOSM/KG (ref 275–295)
PLATELET # BLD AUTO: 156 10(3)UL (ref 150–450)
POTASSIUM SERPL-SCNC: 3.8 MMOL/L (ref 3.5–5.1)
PROT SERPL-MCNC: 6.2 G/DL (ref 6.4–8.2)
RBC # BLD AUTO: 4.32 X10(6)UL
SODIUM SERPL-SCNC: 142 MMOL/L (ref 136–145)
WBC # BLD AUTO: 3.8 X10(3) UL (ref 4–11)

## 2023-11-16 PROCEDURE — 83615 LACTATE (LD) (LDH) ENZYME: CPT

## 2023-11-16 PROCEDURE — 85025 COMPLETE CBC W/AUTO DIFF WBC: CPT

## 2023-11-16 PROCEDURE — 99214 OFFICE O/P EST MOD 30 MIN: CPT | Performed by: INTERNAL MEDICINE

## 2023-11-16 PROCEDURE — 80053 COMPREHEN METABOLIC PANEL: CPT

## 2023-11-16 PROCEDURE — 36591 DRAW BLOOD OFF VENOUS DEVICE: CPT

## 2023-11-16 RX ORDER — ERYTHROMYCIN 5 MG/G
1 OINTMENT OPHTHALMIC 2 TIMES DAILY
COMMUNITY
Start: 2023-11-06 | End: 2023-11-16

## 2023-11-16 NOTE — PROGRESS NOTES
Patient is here for MD follow up for T-cell Lymphoma. Patient had a PET scan on 11/13. Here to discuss results. He reports ongoing fatigue. Not a new symptoms. Appetite is fair. Patient had a recent eye infection and is on Erythromycin ointment temporarily.        Education Record    Learner:  Patient and Spouse    Disease / Diagnosis:  T-cell Lymphoma     Barriers / Limitations:  None   Comments:    Method:  Discussion   Comments:    General Topics:  Plan of care reviewed   Comments:    Outcome:  Shows understanding   Comments:

## 2023-11-16 NOTE — PROGRESS NOTES
Education Record    Learner:  Patient and Spouse    Disease / Diagnosis: prostate cancer, lymphoma    Barriers / Limitations:  None   Comments:    Method:  Discussion   Comments:    General Topics:  Plan of care reviewed   Comments:    Outcome:  Shows understanding   Comments:    Pt here for central line lab draw prior to MD visit. Tolerated well. Discharged in stable condition.

## 2023-11-29 ENCOUNTER — TELEPHONE (OUTPATIENT)
Dept: NEUROLOGY | Facility: CLINIC | Age: 83
End: 2023-11-29

## 2023-11-29 NOTE — TELEPHONE ENCOUNTER
Called patient back. Phone number was for Daughter Debbie Watson. She was unaware why patient was calling. She states the medication has not had time to work. She states she will give her dad a call back and if needed will update this office.

## 2023-11-29 NOTE — TELEPHONE ENCOUNTER
Pt called [facility]   -pt states Rx rivastigmine 4.6 MG/24HR Transdermal Patch 24 Hr is not improving condition.   -Please advise Pt at N#[phone]

## 2023-12-11 ENCOUNTER — TELEPHONE (OUTPATIENT)
Dept: DERMATOLOGY | Age: 83
End: 2023-12-11

## 2023-12-11 DIAGNOSIS — H00.019 HORDEOLUM EXTERNUM, UNSPECIFIED LATERALITY: Primary | ICD-10-CM

## 2023-12-12 ENCOUNTER — TELEPHONE (OUTPATIENT)
Dept: OPTOMETRY | Age: 83
End: 2023-12-12

## 2023-12-13 ENCOUNTER — OFFICE VISIT (OUTPATIENT)
Dept: OPTOMETRY | Age: 83
End: 2023-12-13

## 2023-12-13 DIAGNOSIS — H15.89: ICD-10-CM

## 2023-12-13 DIAGNOSIS — H00.011 HORDEOLUM EXTERNUM OF RIGHT UPPER EYELID: Primary | ICD-10-CM

## 2023-12-13 DIAGNOSIS — H26.493 AFTER CATARACT OF BOTH EYES NOT OBSCURING VISION: ICD-10-CM

## 2023-12-13 DIAGNOSIS — Z96.1 S/P CATARACT EXTRACTION AND INSERTION OF INTRAOCULAR LENS, RIGHT: ICD-10-CM

## 2023-12-13 DIAGNOSIS — H01.021 SQUAMOUS BLEPHARITIS OF RIGHT UPPER EYELID: ICD-10-CM

## 2023-12-13 DIAGNOSIS — Z96.1 S/P CATARACT EXTRACTION AND INSERTION OF INTRAOCULAR LENS, LEFT: ICD-10-CM

## 2023-12-13 DIAGNOSIS — Z98.42 S/P CATARACT EXTRACTION AND INSERTION OF INTRAOCULAR LENS, LEFT: ICD-10-CM

## 2023-12-13 DIAGNOSIS — Z98.41 S/P CATARACT EXTRACTION AND INSERTION OF INTRAOCULAR LENS, RIGHT: ICD-10-CM

## 2023-12-13 PROCEDURE — 92004 COMPRE OPH EXAM NEW PT 1/>: CPT | Performed by: OPTOMETRIST

## 2023-12-13 ASSESSMENT — VISUAL ACUITY
METHOD: SNELLEN - LINEAR
OD_SC: 20/40
OD_SC+: +2
OS_SC: 20/40
OS_SC+: -2

## 2023-12-13 ASSESSMENT — TONOMETRY
OS_IOP_MMHG: 14
OD_IOP_MMHG: 14

## 2023-12-13 ASSESSMENT — CONF VISUAL FIELD
OS_INFERIOR_TEMPORAL_RESTRICTION: 0
OD_SUPERIOR_TEMPORAL_RESTRICTION: 0
OS_NORMAL: 1
OD_NORMAL: 1
OD_SUPERIOR_NASAL_RESTRICTION: 0
OS_SUPERIOR_NASAL_RESTRICTION: 0
OD_INFERIOR_TEMPORAL_RESTRICTION: 0
OS_SUPERIOR_TEMPORAL_RESTRICTION: 0
OD_INFERIOR_NASAL_RESTRICTION: 0
OS_INFERIOR_NASAL_RESTRICTION: 0

## 2023-12-13 ASSESSMENT — CUP TO DISC RATIO
OS_RATIO: 0.6
OD_RATIO: 0.6

## 2023-12-13 ASSESSMENT — EXTERNAL EXAM - RIGHT EYE: OD_EXAM: NORMAL

## 2023-12-13 ASSESSMENT — SLIT LAMP EXAM - LIDS: COMMENTS: NORMAL

## 2023-12-13 ASSESSMENT — EXTERNAL EXAM - LEFT EYE: OS_EXAM: NORMAL

## 2024-01-03 ENCOUNTER — NURSE ONLY (OUTPATIENT)
Dept: HEMATOLOGY/ONCOLOGY | Facility: HOSPITAL | Age: 84
End: 2024-01-03

## 2024-01-04 DIAGNOSIS — R26.81 UNSTABLE GAIT: Primary | ICD-10-CM

## 2024-01-20 ENCOUNTER — OFFICE VISIT (OUTPATIENT)
Dept: FAMILY MEDICINE CLINIC | Facility: CLINIC | Age: 84
End: 2024-01-20
Payer: MEDICARE

## 2024-01-20 VITALS
SYSTOLIC BLOOD PRESSURE: 110 MMHG | TEMPERATURE: 98 F | DIASTOLIC BLOOD PRESSURE: 60 MMHG | BODY MASS INDEX: 23 KG/M2 | OXYGEN SATURATION: 97 % | WEIGHT: 125.38 LBS | HEART RATE: 68 BPM

## 2024-01-20 DIAGNOSIS — E03.4 HYPOTHYROIDISM DUE TO ACQUIRED ATROPHY OF THYROID: Primary | ICD-10-CM

## 2024-01-20 DIAGNOSIS — G30.9 ALZHEIMER'S DISEASE, UNSPECIFIED (HCC): ICD-10-CM

## 2024-01-20 DIAGNOSIS — C84.48 PERIPHERAL T-CELL LYMPHOMA OF LYMPH NODES OF MULTIPLE REGIONS (HCC): ICD-10-CM

## 2024-01-20 DIAGNOSIS — F02.80 ALZHEIMER'S DISEASE, UNSPECIFIED (HCC): ICD-10-CM

## 2024-01-20 DIAGNOSIS — D69.6 THROMBOCYTOPENIA (HCC): ICD-10-CM

## 2024-01-20 DIAGNOSIS — E44.1 MILD PROTEIN-CALORIE MALNUTRITION (HCC): ICD-10-CM

## 2024-01-20 DIAGNOSIS — F33.41 RECURRENT MAJOR DEPRESSIVE DISORDER, IN PARTIAL REMISSION (HCC): ICD-10-CM

## 2024-01-20 DIAGNOSIS — D84.9 IMMUNOCOMPROMISED (HCC): ICD-10-CM

## 2024-01-20 DIAGNOSIS — I10 ESSENTIAL HYPERTENSION: ICD-10-CM

## 2024-01-20 PROBLEM — J44.89 ASTHMA WITH COPD (CHRONIC OBSTRUCTIVE PULMONARY DISEASE) (HCC): Chronic | Status: ACTIVE | Noted: 2024-01-20

## 2024-01-20 PROBLEM — J44.89 ASTHMA WITH COPD (CHRONIC OBSTRUCTIVE PULMONARY DISEASE): Chronic | Status: ACTIVE | Noted: 2024-01-20

## 2024-01-20 PROBLEM — F32.4 MAJOR DEPRESSION IN PARTIAL REMISSION (HCC): Chronic | Status: ACTIVE | Noted: 2024-01-20

## 2024-01-20 PROBLEM — F32.4 MAJOR DEPRESSION IN PARTIAL REMISSION: Chronic | Status: ACTIVE | Noted: 2024-01-20

## 2024-01-20 LAB
ALBUMIN SERPL-MCNC: 3.9 G/DL (ref 3.4–5)
ALBUMIN/GLOB SERPL: 1.4 {RATIO} (ref 1–2)
ALP LIVER SERPL-CCNC: 98 U/L
ANION GAP SERPL CALC-SCNC: 4 MMOL/L (ref 0–18)
AST SERPL-CCNC: 17 U/L (ref 15–37)
BILIRUB SERPL-MCNC: 1.3 MG/DL (ref 0.1–2)
BUN BLD-MCNC: 12 MG/DL (ref 9–23)
CALCIUM BLD-MCNC: 8.8 MG/DL (ref 8.5–10.1)
CHLORIDE SERPL-SCNC: 109 MMOL/L (ref 98–112)
CO2 SERPL-SCNC: 30 MMOL/L (ref 21–32)
CREAT BLD-MCNC: 0.95 MG/DL
EGFRCR SERPLBLD CKD-EPI 2021: 80 ML/MIN/1.73M2 (ref 60–?)
FASTING STATUS PATIENT QL REPORTED: YES
GLOBULIN PLAS-MCNC: 2.7 G/DL (ref 2.8–4.4)
GLUCOSE BLD-MCNC: 90 MG/DL (ref 70–99)
OSMOLALITY SERPL CALC.SUM OF ELEC: 295 MOSM/KG (ref 275–295)
POTASSIUM SERPL-SCNC: 3.7 MMOL/L (ref 3.5–5.1)
PROT SERPL-MCNC: 6.6 G/DL (ref 6.4–8.2)
SODIUM SERPL-SCNC: 143 MMOL/L (ref 136–145)
T4 FREE SERPL-MCNC: 1 NG/DL (ref 0.8–1.7)
TSI SER-ACNC: 6.05 MIU/ML (ref 0.36–3.74)

## 2024-01-20 PROCEDURE — 84439 ASSAY OF FREE THYROXINE: CPT | Performed by: FAMILY MEDICINE

## 2024-01-20 PROCEDURE — 1159F MED LIST DOCD IN RCRD: CPT | Performed by: FAMILY MEDICINE

## 2024-01-20 PROCEDURE — 1170F FXNL STATUS ASSESSED: CPT | Performed by: FAMILY MEDICINE

## 2024-01-20 PROCEDURE — 1160F RVW MEDS BY RX/DR IN RCRD: CPT | Performed by: FAMILY MEDICINE

## 2024-01-20 PROCEDURE — 99214 OFFICE O/P EST MOD 30 MIN: CPT | Performed by: FAMILY MEDICINE

## 2024-01-20 PROCEDURE — 84443 ASSAY THYROID STIM HORMONE: CPT | Performed by: FAMILY MEDICINE

## 2024-01-20 PROCEDURE — 80053 COMPREHEN METABOLIC PANEL: CPT | Performed by: FAMILY MEDICINE

## 2024-01-20 PROCEDURE — 3078F DIAST BP <80 MM HG: CPT | Performed by: FAMILY MEDICINE

## 2024-01-20 PROCEDURE — 3074F SYST BP LT 130 MM HG: CPT | Performed by: FAMILY MEDICINE

## 2024-01-20 RX ORDER — ESCITALOPRAM OXALATE 5 MG/1
5 TABLET ORAL DAILY
Qty: 90 TABLET | Refills: 1 | Status: SHIPPED | OUTPATIENT
Start: 2024-01-20

## 2024-01-20 RX ORDER — AMLODIPINE BESYLATE 5 MG/1
5 TABLET ORAL DAILY
Qty: 90 TABLET | Refills: 1 | Status: SHIPPED | OUTPATIENT
Start: 2024-01-20

## 2024-01-20 NOTE — PROGRESS NOTES
Subjective:   Patient ID: Lenin Nagy is a 83 year old male.  Off thyroid x 1 month approx  Taking meds as directed  Feeling well  Seen w/ daughter  HPI    History/Other:   Review of Systems   Constitutional:  Negative for chills and fever.   Respiratory:  Negative for cough and shortness of breath.    Cardiovascular:  Negative for chest pain, palpitations and leg swelling.   Gastrointestinal: Negative.    Genitourinary: Negative.      Current Outpatient Medications   Medication Sig Dispense Refill    amLODIPine 5 MG Oral Tab Take 1 tablet (5 mg total) by mouth daily. 90 tablet 1    escitalopram 5 MG Oral Tab Take 1 tablet (5 mg total) by mouth daily. 90 tablet 1    fluorouracil 5 % External Cream Apply to the affected areas twice daily for four weeks; during use the areas will feel irritated and look red and swollen      Nutritional Supplements (JUICE PLUS FIBRE OR) Take 1 tablet by mouth daily.      triamcinolone acetonide 0.1 % External Ointment Apply 1 Application topically daily as needed.        acetaminophen 500 MG Oral Tab Take 1 tablet (500 mg total) by mouth every 6 (six) hours as needed for Pain.      rivastigmine 4.6 MG/24HR Transdermal Patch 24 Hr Place 1 patch onto the skin daily. (Patient not taking: Reported on 1/20/2024) 30 patch 5     Allergies:  Allergies   Allergen Reactions    Emend [Aprepitant] HIVES     One single hive after completion of emend.     Other OTHER (SEE COMMENTS)     watery and itchy eyes- CATS    Penicillins RASH    Ragweed ITCHING and OTHER (SEE COMMENTS)     stuffiness       Objective:   Physical Exam  Vitals reviewed.   Constitutional:       Appearance: Normal appearance.   Eyes:      Conjunctiva/sclera: Conjunctivae normal.   Cardiovascular:      Rate and Rhythm: Normal rate and regular rhythm.      Pulses: Normal pulses.      Heart sounds: Normal heart sounds.   Pulmonary:      Effort: Pulmonary effort is normal.      Breath sounds: Normal breath sounds.   Abdominal:       General: Bowel sounds are normal.      Palpations: Abdomen is soft.   Musculoskeletal:      Cervical back: Normal range of motion and neck supple.   Skin:     General: Skin is warm and dry.   Neurological:      General: No focal deficit present.      Mental Status: He is alert and oriented to person, place, and time.         Assessment & Plan:   1. Hypothyroidism due to acquired atrophy of thyroid    2. 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 Immunocompromised (HCC)    4. Mild protein-calorie malnutrition (HCC)    5. Alzheimer's disease, unspecified (HCC)    6. Thrombocytopenia (HCC)    7. Essential hypertension    8. Recurrent major depressive disorder, in partial remission (HCC)        Orders Placed This Encounter   Procedures    TSH and Free T4 [E]    Comp Metabolic Panel (14) [E]       Meds This Visit:  Requested Prescriptions     Signed Prescriptions Disp Refills    amLODIPine 5 MG Oral Tab 90 tablet 1     Sig: Take 1 tablet (5 mg total) by mouth daily.    escitalopram 5 MG Oral Tab 90 tablet 1     Sig: Take 1 tablet (5 mg total) by mouth daily.       Imaging & Referrals:  None

## 2024-01-20 NOTE — PATIENT INSTRUCTIONS
Meds as directed  Reg exercise  Specialists  Discussed 2.5 mg trial lexapro daily  Call ? Or problems

## 2024-01-22 ENCOUNTER — PATIENT MESSAGE (OUTPATIENT)
Dept: FAMILY MEDICINE CLINIC | Facility: CLINIC | Age: 84
End: 2024-01-22

## 2024-01-22 DIAGNOSIS — E03.9 ACQUIRED UNDERACTIVE THYROID: Primary | ICD-10-CM

## 2024-01-22 DIAGNOSIS — E03.4 HYPOTHYROIDISM DUE TO ACQUIRED ATROPHY OF THYROID: Primary | ICD-10-CM

## 2024-01-22 RX ORDER — LEVOTHYROXINE SODIUM 0.03 MG/1
25 TABLET ORAL
Qty: 90 TABLET | Refills: 0 | Status: SHIPPED | OUTPATIENT
Start: 2024-01-22 | End: 2024-01-29

## 2024-01-22 NOTE — TELEPHONE ENCOUNTER
From: Lenin Nagy  To: Salas Cagle  Sent: 1/22/2024 10:13 AM CST  Subject: Prescription    Will a prescription be sent to [facility] in [place] [facility] for the 25 mcg levothyroxine? Will there also be an order for a recheck in 3 months? Can we do that anywhere?    Thank you,  Merlene Billymarcyjyotsna  (Lenin's daughter)  [phone]

## 2024-01-29 RX ORDER — LEVOTHYROXINE SODIUM 0.03 MG/1
25 TABLET ORAL
Qty: 90 TABLET | Refills: 0 | OUTPATIENT
Start: 2024-01-29

## 2024-01-29 RX ORDER — LEVOTHYROXINE SODIUM 0.03 MG/1
25 TABLET ORAL
Qty: 90 TABLET | Refills: 0 | Status: SHIPPED | OUTPATIENT
Start: 2024-01-29

## 2024-01-29 NOTE — TELEPHONE ENCOUNTER
LEVOTHYROXINE 25 MCG Oral Tab     Thyroid Supplements Protocol [id number]/28/2024 02:38 PM   Protocol Details TSH value between 0.350 and 5.500 IU/ml    TSH test in past 12 months    Appointment in past 12 or next 3 months      Last office visit:  1/20/24  Future Appointments   Date Time Provider Department Center   2/14/2024 10:00 AM  [place] RN1  [facility]   2/14/2024 10:30 AM Misha Goodwin MD  HEM [facility]   4/19/2024 10:50 AM Maico Cortez MD ENINAP[facility]   Last filled:  1/22/24  #90 with 0 refills   Last labs:  1/20/24  TSH:  6.050

## 2024-02-02 ENCOUNTER — TELEPHONE (OUTPATIENT)
Dept: HEMATOLOGY/ONCOLOGY | Facility: HOSPITAL | Age: 84
End: 2024-02-02

## 2024-02-14 ENCOUNTER — OFFICE VISIT (OUTPATIENT)
Dept: HEMATOLOGY/ONCOLOGY | Facility: HOSPITAL | Age: 84
End: 2024-02-14
Attending: INTERNAL MEDICINE
Payer: MEDICARE

## 2024-02-14 VITALS
WEIGHT: 128.38 LBS | RESPIRATION RATE: 18 BRPM | HEIGHT: 62.01 IN | DIASTOLIC BLOOD PRESSURE: 85 MMHG | TEMPERATURE: 99 F | OXYGEN SATURATION: 96 % | HEART RATE: 68 BPM | BODY MASS INDEX: 23.33 KG/M2 | SYSTOLIC BLOOD PRESSURE: 143 MMHG

## 2024-02-14 DIAGNOSIS — C84.48 PERIPHERAL T-CELL LYMPHOMA OF LYMPH NODES OF MULTIPLE REGIONS (HCC): ICD-10-CM

## 2024-02-14 DIAGNOSIS — D72.810 LYMPHOPENIA: ICD-10-CM

## 2024-02-14 DIAGNOSIS — R26.81 UNSTABLE GAIT: ICD-10-CM

## 2024-02-14 DIAGNOSIS — Z86.718 HISTORY OF DVT (DEEP VEIN THROMBOSIS): ICD-10-CM

## 2024-02-14 DIAGNOSIS — C84.48 PERIPHERAL T-CELL LYMPHOMA OF LYMPH NODES OF MULTIPLE REGIONS (HCC): Primary | ICD-10-CM

## 2024-02-14 LAB
ALBUMIN SERPL-MCNC: 3.5 G/DL (ref 3.4–5)
ALBUMIN/GLOB SERPL: 1.3 {RATIO} (ref 1–2)
ALP LIVER SERPL-CCNC: 105 U/L
ALT SERPL-CCNC: 18 U/L
ANION GAP SERPL CALC-SCNC: 4 MMOL/L (ref 0–18)
AST SERPL-CCNC: 16 U/L (ref 15–37)
BASOPHILS # BLD AUTO: 0.04 X10(3) UL (ref 0–0.2)
BASOPHILS NFR BLD AUTO: 0.9 %
BILIRUB SERPL-MCNC: 0.8 MG/DL (ref 0.1–2)
BUN BLD-MCNC: 20 MG/DL (ref 9–23)
CALCIUM BLD-MCNC: 8.7 MG/DL (ref 8.5–10.1)
CHLORIDE SERPL-SCNC: 110 MMOL/L (ref 98–112)
CO2 SERPL-SCNC: 28 MMOL/L (ref 21–32)
CREAT BLD-MCNC: 0.82 MG/DL
EGFRCR SERPLBLD CKD-EPI 2021: 88 ML/MIN/1.73M2 (ref 60–?)
EOSINOPHIL # BLD AUTO: 0.1 X10(3) UL (ref 0–0.7)
EOSINOPHIL NFR BLD AUTO: 2.2 %
ERYTHROCYTE [DISTWIDTH] IN BLOOD BY AUTOMATED COUNT: 12.8 %
FASTING STATUS PATIENT QL REPORTED: NO
GLOBULIN PLAS-MCNC: 2.7 G/DL (ref 2.8–4.4)
GLUCOSE BLD-MCNC: 81 MG/DL (ref 70–99)
HCT VFR BLD AUTO: 41.9 %
HGB BLD-MCNC: 14.6 G/DL
IMM GRANULOCYTES # BLD AUTO: 0.01 X10(3) UL (ref 0–1)
IMM GRANULOCYTES NFR BLD: 0.2 %
LDH SERPL L TO P-CCNC: 186 U/L
LYMPHOCYTES # BLD AUTO: 0.72 X10(3) UL (ref 1–4)
LYMPHOCYTES NFR BLD AUTO: 16.1 %
MCH RBC QN AUTO: 32.7 PG (ref 26–34)
MCHC RBC AUTO-ENTMCNC: 34.8 G/DL (ref 31–37)
MCV RBC AUTO: 93.7 FL
MONOCYTES # BLD AUTO: 0.46 X10(3) UL (ref 0.1–1)
MONOCYTES NFR BLD AUTO: 10.3 %
NEUTROPHILS # BLD AUTO: 3.13 X10 (3) UL (ref 1.5–7.7)
NEUTROPHILS # BLD AUTO: 3.13 X10(3) UL (ref 1.5–7.7)
NEUTROPHILS NFR BLD AUTO: 70.3 %
OSMOLALITY SERPL CALC.SUM OF ELEC: 296 MOSM/KG (ref 275–295)
PLATELET # BLD AUTO: 160 10(3)UL (ref 150–450)
POTASSIUM SERPL-SCNC: 4 MMOL/L (ref 3.5–5.1)
PROT SERPL-MCNC: 6.2 G/DL (ref 6.4–8.2)
RBC # BLD AUTO: 4.47 X10(6)UL
SODIUM SERPL-SCNC: 142 MMOL/L (ref 136–145)
WBC # BLD AUTO: 4.5 X10(3) UL (ref 4–11)

## 2024-02-14 PROCEDURE — 83615 LACTATE (LD) (LDH) ENZYME: CPT | Performed by: INTERNAL MEDICINE

## 2024-02-14 PROCEDURE — 80053 COMPREHEN METABOLIC PANEL: CPT

## 2024-02-14 PROCEDURE — 85025 COMPLETE CBC W/AUTO DIFF WBC: CPT | Performed by: INTERNAL MEDICINE

## 2024-02-14 PROCEDURE — G2211 COMPLEX E/M VISIT ADD ON: HCPCS | Performed by: INTERNAL MEDICINE

## 2024-02-14 PROCEDURE — 36591 DRAW BLOOD OFF VENOUS DEVICE: CPT

## 2024-02-14 PROCEDURE — 99215 OFFICE O/P EST HI 40 MIN: CPT | Performed by: INTERNAL MEDICINE

## 2024-02-14 NOTE — PROGRESS NOTES
HPI:    Baltazar Julio is a 29year old female presents to clinic for follow up. Has iron deficiency anemia, gets iron transfusions as needed, recently had one.  States that Dr. Diana Jarvis told her that she needed to look for a solution for her heavy cycles in o Outpatient Oncology Care Plan  Problem list:  Knowledge deficit  Fatigue     Problems related to:    chemotherapy  disease/disease progression  side effect of treatment     Interventions:  provided general teaching     Expected outcomes:  understands plan of care     Progress towards outcome:  making progress     Education Record     Learner:  Patient  Barriers / Limitations:  None  Method:  Brief focused  Outcome:  Shows understanding  Comments: 3 month MD f/up T-cell lymphoma. Pt states he is feeling fatigued. Denies any updates to med or hx. denies any fevers, night sweats.    taking differently: Take 325 mg by mouth 3 (three) times daily.  ) Disp: 60 tablet Rfl: 1       Allergies:  No Known Allergies      ROS:   See HPI     PHYSICAL EXAM:      08/14/17  1742   BP: 108/73   BP Location: Left arm   Patient Position: Sitting   Cuf

## 2024-02-14 NOTE — PROGRESS NOTES
Hematology/Oncology Clinic Follow Up Visit    Patient Name: Lenin Nagy  Medical Record Number: [id number]   YOB: 1940    PCP: Dr. Salas Cagle  Other providers: Dr. Ruslan Ortega, Dr. Elkin Weaver, Dr. Fritz Brewer, Dr. 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  No bowel herniation. There are mildly enlarged periaortic lymph nodes seen below the left renal hilum.  The largest lymph node is a new finding compared to a previous CT 10/9/2012.   -11/11/20- underwent ventral incisional herniorrhaphy, left axillary lymph node excisional biopsy (Dr. Elkin Weaver)- pathology of the left axillary lymph node showed multiple lymph nodes with nonspecific reactive features, no evidence of malignancy, flow cytometry is normal with polytypic B-cell expression.  -11/24/20- gallium PET/CT- There is nonspecific patchy low level uptake of the radiopharmaceutical within numerous lymph nodes in the lower neck, supraclavicular regions, mediastinum, nir, axillary regions, retropectoral regions, along both groins, and along the   external iliac chains.  These findings are nonspecific and clinical correlation is recommended.   12/15/20- CT guided bx of left external iliac lymph node- procedure aborted due to CT imaging showing improvement.  The left external iliac lymph node has decreased in size and currently measures 1.6 x 0.7 cm.  On prior CT this measured 2.7 x 1.6 cm.  No biopsy was performed.  -6/11/21- CT c/a/p- new enlarged retroperitoneal lymph node anterior to the IVC measuring up to 5.5 cm.  Other lymph nodes previously seen are small and stable or decreased in size  -7/6/21- EUS FNB of retroperitoneal LN (Dr. Brewer)- atypical lymphoid infiltrate, worrisome for involvement by lymphoma.   Reviewed at [facility].  The biopsy shows an infiltrate of T cells which stain as CD3 positive T cells and coexpress CD2, CD5 and variable CD30.  CD4 and CD8 do not appear to be coexpressed and CD7 is not expressed.  Slightly increased numbers of background CD20 and PAX 5+ B cells are also noted; some appear larger than normal but there is a spectrum of lymphocytes size within the CD20 positive B cells.  Cytokeratin AE1/AE3 is negative.  Due to the challenging aspects of the case is sent to [facility] for consultation.  Gene rearrangement analysis was performed on the paraffin block and showed an equivocal T-cell receptor gene rearrangement analysis.  No clonal immunoglobulin gene rearrangement was detected. Overall the findings are worrisome for lymphoma, particularly T-cell lymphoma, however the diagnosis cannot be confirmed on this biopsy material.  -7/22/21- PET/CT- Enlarged retroperitoneal lymph node just anterior to vena cava has increased FDG avidity with max SUV 6.7. Moderate increased metabolic activity is evident in a single left inguinal lymph node with maximum SUV of 5.8, however, this LN is not enlarged and demonstrates normal lymph node architecture. Other lymph nodes scattered throughout chest, abdomen and pelvis do not demonstrate any significant increased metabolic activity and are not pathologically enlarged.     -8/5/21- CT guided bx of RP LN/mass- atypical lymphohistiocytic infiltrate, worrisome for lymphoma.  Reviewed at [facility]. The findings remain concerning for lymphoma, particularly T-cell lymphoma. This appears to be a complex process by morphology and immunoarchitecture and therefore small biopsies, which do not widely sample the process are unlikely to result in adequate tissue for definitive diagnosis. Consider larger tissue biopsy.   Histologic sections show a lymphohistiocytic infiltrate composed of lymphoid cells with small nuclei, slightly irregular nuclear contours and  condensed chromatin intermixed with numerous histiocytes and scattered intermediate sized immunoblastic cells. There are few intermixed eosinophils.   Immunoperoxidase stains were performed at [facility] in [place], [place] (block A1: CD3, CD4, CD7, CD8, CD20, CD21, CD30,  (PD-1), CXCL13, ICOS, TCR betaF1, TCR delta). In situ hybridization for Fabiano-Barr virus encoded RNA (AMADOU) was performed at [facility].   There are modest numbers of T-cells with slight nuclear irregularities that stained for CD3, TCR betaF1,  (PD-1), ICOS and CXCL13 (partial). There are similar numbers of CD4 positive cells and CD8 positive cells. TCR delta positive T-cells are not increased. The T-cells are negative for all other markers tested. CD20 stains B-cells in loose aggregates and scattered interstitially. CD4 weakly stains numerous intermixed histiocytes. CD21 highlights scattered follicular dendritic cell meshworks. AMADOU and CD30 stains are negative.   T-cell receptor gene rearrangement, paraffin embedded tissue, performed at [facility] in [place], [place] ([id number], block A1): Invalid study due to inadequate amplification of T-cell receptor target DNA and internal control. This suggests that inhibitors of PCR or minimal target and total DNA are present.  -8/31/21-left inguinal lymph node surgical excision (Dr. Ortega)- pathology consistent with peripheral T-cell lymphoma, not otherwise specified.  Pathology reviewed at [facility]. While this T-cell lymphoma shares many immunomorphologic features with angioimmunoblastic T-cell lymphoma (AITL), because it lacks definitive CD4 expression it is best classified as peripheral T-cell lymphoma, not otherwise specified (PTCL-NOS). Given these AITL-like features, it is possible that this lymphoma may show clinical features similar to AITL.    Histologic sections show portions of multiple lymph nodes partially effaced by an atypical lymphoid infiltrate comprised of intermediate-sized  lymphocytes with irregular nuclei, vesicular chromatin, and abundant pale cytoplasm in a polymorphous background of small lymphocytes, eosinophils, small vessels, and scattered immunoblasts.   The neoplastic cells liborio as CD3-positive T-cells that co-express CD2, CD5, , CD10 (subset weak), BCL6 (subset), CXCL13, ICOS, CD30 (partial), TCR beta F1, and are negative for the remaining markers tested, including CD4 and CD8. The background shows scattered EBV-positive cells. Underlying follicular dendritic cell meshworks are focally expanded, highlighted by CD21. Background plasma cells overall show polytypic staining by kappa/lambda with focal areas showing skewing towards both lambda and kappa. The neoplastic infiltrate is best visualized on block A1 with only a focal fragment involved in block A2, obscured by crush artifact.   Flow cytometric analysis: CD19-positive B-cells show a kappa/lambda ratio of 1.3. CD3-positive T-cells show a CD4/CD8 ratio of 2.0.   Molecular studies, T-cell receptor gene rearrangement analysis, left inguinal lymph node, performed at [place], [place] ([id number]; [id number]; block A1, 8/31/2021): Positive. Clonal T-cell receptor gene rearrangement was detected.   -9/30/21- PET/CT- All of the lymph nodes listed below are subcentimeter in size. Uptake values are given in SUV maximum.   Right lower neck 2.7     Left supraclavicular 2.5     Another left supraclavicular 1.5     Upper left axillary 2.9     Lower left axilla 2.3     Right axillary scattered subcentimeter nodes, greatest uptake 2.9.   Left internal mammary 2.7     Subcarinal 4.5     Right hilar 4.5     AP window 3.4     Left hilum 3.3     Lower left hilum 2.5     Previously identified enlarged lymph node in the retroperitoneum anterior to the vena cava is no longer identified.  Numerous clips are present within the left groin, and stranding and possibly some fluid is region postsurgical in nature.  Uptake in the left  groin 5.6 SUV maximum.  Subcentimeter lymph nodes in the right groin without any surgery seen the right groin SUV max 2.6.  -10/1/21- bmbx-mildly hypercellular bone marrow with active trilineage hematopoiesis.  No definite evidence of lymphoma.  No increased blasts.  No significant dysplastic changes.  Flow cytometry and T-cell receptor gene rearrangement shows no monotypic lymphocyte population.  Normal karyotype.   -10/6/21- cycle 1 BV-CHP (dose modified given age/frailty- brentuximab vedotin 1.8 mg/kg, Cyclophosphamide 400mg/m2 IV, Doxorubicin 25mg/m2 IV all D1, Prednisone 100mg PO D1-5, pegfilgrastim D2; q21 days)  -10/28/21- cycle 2 BV-CHP  -11/18/21- cycle 3 BV-CHP  -12/7/21- PET/CT- Deauville score of 1, with resolution of lymph node uptake previously demonstrated, and decrease in the size of associated lymph nodes.  Concern for atypical pneumonia. New areas of pulmonary parenchymal FDG activity, mostly on the left, but also on the right, corresponding to areas of ground-glass opacity and lung consolidation, with the greatest level of uptake within the superior segment left lower lobe, showing SUV maximum 6.7.  This corresponds to a more dense area of ground-glass disease or developing lung consolidation.  Other areas have more clearly ground-glass opacity and have elevated although lesser degrees of elevation of activity.  These do not appear typical for neoplasm and are felt to be reflecting atypical pneumonia.     -Delayed cycle 4 due to severe fatigue and concern for pulmonary infiltrates.  Completed 21-day course of empiric therapeutic Bactrim for possible PJP and 10-day course of Levaquin for possible PNA  -1/5/22- CT chest- Overall there has been improvement in patchy bilateral ground-glass opacities within the lungs, now only slight residual airspace opacities present.  -1/6/22- cycle 4 BV-CHP (was delayed 4 weeks d/t severe fatigue and development of pulm infiltrates- PJP vs other PNA vs  pneumonitis?)   -dose reduced brentuximab to 1.4mg/m2 d/t possible pneumonitis and fatigue)   -1/27/22- cycle 5 BV-CHP  -1/27/22- LLE venous doppler- superficial thrombophlebitis involving the greater saphenous vein as well as extension into the popliteal vein.     -started Eliquis  -2/17/22- cycle 6 BV-CHP  -3/9/22- PET/CT- complete response, Deauville score= 1. No recurrent abnormal lymph node activity seen.  Resolution of pneumonia.   -4/26/22- LLE venous doppler- Diffuse left greater saphenous vein thrombus noted previously is unchanged.  Partial thrombus in the left popliteal vein noted previously has resolved.  There is no new DVT.  -7/25/22- PET/CT- There is no evidence of lymphoma within the neck, chest, abdomen, pelvis.  -2/7/23- PET/CT- multiple areas of increased FDG uptake noted, though does not appear to localize to any particular structure and felt to likely represent artifact. No mass or enlarged lymph nodes are seen.  -5/17/23- PET/CT- no evidence of lymphoma.  Deauville score 1.  -8/11/23- MRI brain- No evidence of an acute infarct.  Moderate FLAIR abnormalities in the white matter are likely sequelae of chronic small vessel ischemic disease.  Mild global parenchymal volume loss.   -11/13/23- PET/CT- no evidence of recurrent lymphoma.  Deauville score remains 1.    ===============================  Interval events: Presents for follow-up.  Overall he is doing well without significant changes.  He is still following with physical therapy, it sounds like he is now doing some component of vestibular therapy which seems to be helpful.  He has not had any falls since his last visit with me.      He remains on rivastigmine patch for history of dementia.  He follows with neurology.  His wife feels his memory has been stable over the last several months.    Chronic dyspnea with exertion remains stable.  Energy is stable.    His main complaint is urinary frequency with intermittent incontinence is unchanged.   He previously went for pelvic floor therapy and saw urology but has not followed up with them lately.    No recent fevers, infections, or drenching night sweats.  No new aches or pains.      He received an updated COVID-19 and influenza vaccine this season.  Unclear if he received an RSV vaccine.     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) 9/22/2021    Personal history of antineoplastic chemotherapy     last chemo 11/2021    Pneumonia due to organism     2016    Prostate cancer ([facility])     Sleep apnea     cpap    Visual impairment     glasses    Wears glasses      Past Surgical History:   Procedure Laterality Date    ANESTH,REMOVAL OF PROSTATE      CATARACT      CATARACTS, OPHTHM ([facility])      CHOLECYSTECTOMY  2011?    COLONOSCOPY  10/27/2020    DJP     HERNIA SURGERY      x2    LAPAROSCOPY, SURGICAL PROSTATECTOMY, RETROPUBIC RADICAL, W/NERVE SPARING  2008    OTHER  08/31/2021    Left inguinal lymph node biopsy    OTHER SURGICAL HISTORY  11/11/2020    VENTRAL INCISIONAL HERNIA REPAIR, PRIMARY, LEFT AXILLARY LYMPH NODE EXCISIONAL BIOPSY    REMOVAL GALLBLADDER      REPAIR ING HERNIA,5+Y/O,REDUCIBL      TONSILLECTOMY       Home Medications:   levothyroxine 25 MCG Oral Tab Take 1 tablet (25 mcg total) by mouth before breakfast. Lost medication-stolen override 90 tablet 0    amLODIPine 5 MG Oral Tab Take 1 tablet (5 mg total) by mouth  daily. 90 tablet 1    escitalopram 5 MG Oral Tab Take 1 tablet (5 mg total) by mouth daily. 90 tablet 1    rivastigmine 4.6 MG/24HR Transdermal Patch 24 Hr Place 1 patch onto the skin daily. 30 patch 5    fluorouracil 5 % External Cream Apply to the affected areas twice daily for four weeks; during use the areas will feel irritated and look red and swollen      Nutritional Supplements (JUICE PLUS FIBRE OR) Take 1 tablet by mouth daily.      triamcinolone acetonide 0.1 % External Ointment Apply 1 Application topically daily as needed.        acetaminophen 500 MG Oral Tab Take 1 tablet (500 mg total) by mouth every 6 (six) hours as needed for Pain.       Allergies:   Allergies   Allergen Reactions    Emend [Aprepitant] HIVES     One single hive after completion of emend.     Other OTHER (SEE COMMENTS)     watery and itchy eyes- CATS    Penicillins RASH    Ragweed ITCHING and OTHER (SEE COMMENTS)     stuffiness       Psychosocial History:  Social History     Social History Narrative    .  6 adult children.  Used to be an ,      Social History     Socioeconomic History    Marital status:    Tobacco Use    Smoking status: Former     Packs/day: 0.50     Years: 14.00     Additional pack years: 0.00     Total pack years: 7.00     Types: Cigarettes     Start date: 1953     Quit date: 1967     Years since quittin.2    Smokeless tobacco: Never    Tobacco comments:     quit over 50 years ago   Vaping Use    Vaping Use: Never used   Substance and Sexual Activity    Alcohol use: Not Currently    Drug use: Never   Other Topics Concern    Caffeine Concern Yes     Comment: coffee half a cup    Exercise Yes     Comment: PT   Social History Narrative    .  6 adult children.  Used to be an ,      Family Medical History:  Family History   Problem Relation Age of Onset    Cancer Mother 53        Brain    Colon Cancer Father 73    Cancer Brother 55         Mesothelioma     Review of Systems:  A 10-point ROS was done with pertinent positives and negative per the HPI    Vital Signs:  Height: 157.5 cm (5' 2.01\") (02/14 0950)  Weight: 58.2 kg (128 lb 6.4 oz) (02/14 0950)  BSA (Calculated - sq m): 1.58 sq meters (02/14 0950)  Pulse: 68 (02/14 0950)  BP: 143/85 (02/14 0950)  Temp: 98.5 °F (36.9 °C) (02/14 0950)  Do Not Use - Resp Rate: --  SpO2: 96 % (02/14 0950)    Wt Readings from Last 6 Encounters:   02/14/24 58.2 kg (128 lb 6.4 oz)   01/20/24 56.9 kg (125 lb 6 oz)   11/16/23 58.2 kg (128 lb 6.4 oz)   10/20/23 59.4 kg (131 lb)   08/17/23 56.9 kg (125 lb 8 oz)   06/30/23 60.8 kg (134 lb)     ECOG PS: 3    Physical Examination:  General: Patient is alert and oriented, not in acute distress  Psych: Mood and affect are appropriate  Eyes: EOMI  ENT: Oropharynx is clear  CV: Regular rate and rhythm, no murmurs  Respiratory: Lungs clear to auscultation bilaterally  GI/Abd: Soft, non-tender with normoactive bowel sounds, no hepatosplenomegaly  Neurological: Grossly intact.  Unsteady gait.  Lymphatics: No palpable lymphadenopathy.  Skin: no rashes or petechiae  Ext: no LE edema today  Lines: +portacath- no erythema, induration, or drainage    Laboratory:  Lab Results   Component Value Date    WBC 4.5 02/14/2024    WBC 3.8 (L) 11/16/2023    WBC 4.4 08/17/2023    HGB 14.6 02/14/2024    HGB 14.3 11/16/2023    HGB 15.1 08/17/2023    HCT 41.9 02/14/2024    MCV 93.7 02/14/2024    MCH 32.7 02/14/2024    MCHC 34.8 02/14/2024    RDW 12.8 02/14/2024    .0 02/14/2024    .0 11/16/2023    .0 08/17/2023     Lab Results   Component Value Date    GLU 81 02/14/2024    BUN 20 02/14/2024    BUNCREA 19.8 08/02/2021    CREATSERUM 0.82 02/14/2024    CREATSERUM 0.95 01/20/2024    CREATSERUM 0.93 11/16/2023    ANIONGAP 4 02/14/2024    GFRNAA 83 07/27/2022    GFRAA 96 07/27/2022    CA 8.7 02/14/2024    OSMOCALC 296 (H) 02/14/2024    ALKPHO 105 02/14/2024    AST 16 02/14/2024     ALT 18 02/14/2024    BILT 0.8 02/14/2024    TP 6.2 (L) 02/14/2024    ALB 3.5 02/14/2024    GLOBULIN 2.7 (L) 02/14/2024     02/14/2024    K 4.0 02/14/2024     02/14/2024    CO2 28.0 02/14/2024     Lab Results   Component Value Date    INR 1.0 10/04/2021     Lab Results   Component Value Date     02/14/2024     11/16/2023     08/17/2023     04/15/2023     01/13/2023     10/26/2022     07/27/2022     04/28/2022     03/10/2022     02/17/2022     (H) 01/27/2022     01/06/2022     (H) 12/30/2021     (H) 12/16/2021     (H) 12/09/2021     11/18/2021     10/28/2021     10/13/2021     10/06/2021     09/22/2021     08/02/2021     12/23/2020     Lab Results   Component Value Date    ESRML 8 12/23/2020     Lab Results   Component Value Date    CRP <0.29 12/23/2020     Lab Results   Component Value Date    PSA <0.01 01/13/2023    PSA <0.01 08/02/2021    PSA 0.01 11/24/2020     Component      Latest Ref Rng & Units 9/22/2021   Hbsag Screen Index       <0.10   HBSAg Screen      Nonreactive  Nonreactive   HEPATITIS B SURFACE AB QUAL      Nonreactive  Nonreactive   HEPATITIS B SURFACE AB QUANT      mIU/mL <3.10   HEPATITIS B CORE AB, TOTAL      Nonreactive  Nonreactive   HCV AB      Nonreactive  Nonreactive   HIV Antigen Antibody Combo      Non-Reactive Non-Reactive     Imaging:    TTE 9/25/21: Conclusions:   1. Left ventricle: The cavity size was normal. Wall thickness was normal. The estimated ejection fraction was 60-65%. No regional wall motion abnormalities. Doppler parameters are consistent with abnormal left ventricular relaxation - grade 1 diastolic dysfunction.   2. Aortic valve: Trivial regurgitation.   3. Mitral valve: Mildly calcified annulus. Mitral valve demonstrates mildly thickened leaflets. There was mild regurgitation.   4. Left atrium: The left  atrium was mildly dilated.   5. 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  Completed BV-CHP (dose reduced CHP as per \"mini-CHOP\" protocol) x 6 cycles 3/2022.  His end of treatment PET scan showed Deauville 1, complete response. Ongoing surveillance without any evidence of recurrent disease at this time.  -Continue surveillance monitoring as below:   -Clinic visit with CBC, CMP, LDH q3 months x 2 yrs then q6mths until 5 yrs out   -CT or PET/CT (PET/CT favored per NCCN guidelines) q6 months for up to 2 yrs (last routine surveillance scan due 5/2024)-order placed.  -If he still has no signs of any recurrent lymphoma at the time of next visit, I will encourage him to have his port removed    *Lymphopenia  -Related to lymphoma and prior chemotherapy.    -Continue to stay up-to-date with recommended vaccinations- recommend RSV vaccine this season  -Repeat CBC with differential in 3 months     *LLE popliteal DVT and GSV thrombosis, dx'd 1/27/22  -completed 3 months of anticoagulation.  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 Remains off anticoagulation, no s/s of recurrence/progression.    *Gait instability  -Continues to work with physical therapy.  Seems to be doing somewhat better lately.      *Urinary incontinence  -Chronic since prior prostatectomy.  Following with urology, previously had pelvic floor therapy.  I encouraged him to follow-up with urology again if this continues to bother him    *Dementia  -Currently on rivastigmine per neurology.    RTC in 3 months    Misha Goodwin MD  Hematology/Medical Oncology  [facility]    Risk level: High- T-cell lymphoma with multiple other comorbidities as detailed above requiring close monitoring

## 2024-03-15 ENCOUNTER — OFFICE VISIT (OUTPATIENT)
Dept: NEUROLOGY | Facility: CLINIC | Age: 84
End: 2024-03-15
Payer: MEDICARE

## 2024-03-15 VITALS
BODY MASS INDEX: 24 KG/M2 | RESPIRATION RATE: 16 BRPM | HEART RATE: 60 BPM | DIASTOLIC BLOOD PRESSURE: 64 MMHG | SYSTOLIC BLOOD PRESSURE: 90 MMHG | WEIGHT: 130 LBS

## 2024-03-15 DIAGNOSIS — R42 DIZZINESS: ICD-10-CM

## 2024-03-15 DIAGNOSIS — I95.9 HYPOTENSION, UNSPECIFIED HYPOTENSION TYPE: ICD-10-CM

## 2024-03-15 DIAGNOSIS — F03.A0 MILD DEMENTIA WITHOUT BEHAVIORAL DISTURBANCE, PSYCHOTIC DISTURBANCE, MOOD DISTURBANCE, OR ANXIETY, UNSPECIFIED DEMENTIA TYPE (HCC): Primary | ICD-10-CM

## 2024-03-15 PROCEDURE — 99213 OFFICE O/P EST LOW 20 MIN: CPT | Performed by: OTHER

## 2024-03-15 NOTE — PATIENT INSTRUCTIONS
Refill policies:    Allow 2-3 business days for refills; controlled substances may take longer.  Contact your pharmacy at least 5 days prior to running out of medication and have them send an electronic request or submit request through the “request refill” option in your [employer] account.  Refills are not addressed on weekends; covering physicians do not authorize routine medications on weekends.  No narcotics or controlled substances are refilled after noon on Fridays or by on call physicians.  By law, narcotics must be electronically prescribed.  A 30 day supply with no refills is the maximum allowed.  If your prescription is due for a refill, you may be due for a follow up appointment.  To best provide you care, patients receiving routine medications need to be seen at least once a year.  Patients receiving narcotic/controlled substance medications need to be seen at least once every 3 months.  In the event that your preferred pharmacy does not have the requested medication in stock (e.g. Backordered), it is your responsibility to find another pharmacy that has the requested medication available.  We will gladly send a new prescription to that pharmacy at your request.    Scheduling Tests:    If your physician has ordered radiology tests such as MRI or CT scans, please contact Central Scheduling at [phone] right away to schedule the test.  Once scheduled, the [facility] Centralized Referral Team will work with your insurance carrier to obtain pre-certification or prior authorization.  Depending on your insurance carrier, approval may take 3-10 days.  It is highly recommended patients assure they have received an authorization before having a test performed.  If test is done without insurance authorization, patient may be responsible for the entire amount billed.      Precertification and Prior Authorizations:  If your physician has recommended that you have a procedure or additional testing performed the [facility]  Centralized Referral Team will contact your insurance carrier to obtain pre-certification or prior authorization.    You are strongly encouraged to contact your insurance carrier to verify that your procedure/test has been approved and is a COVERED benefit.  Although the [facility] Centralized Referral Team does its due diligence, the insurance carrier gives the disclaimer that \"Although the procedure is authorized, this does not guarantee payment.\"    Ultimately the patient is responsible for payment.   Thank you for your understanding in this matter.  Paperwork Completion:  If you require FMLA or disability paperwork for your recovery, please make sure to either drop it off or have it faxed to our office at [phone]. Be sure the form has your name and date of birth on it.  The form will be faxed to our Forms Department and they will complete it for you.  There is a 25$ fee for all forms that need to be filled out.  Please be aware there is a 10-14 day turnaround time.  You will need to sign a release of information (RAD) form if your paperwork does not come with one.  You may call the Forms Department with any questions at [phone].  Their fax number is [phone].

## 2024-03-15 NOTE — PROGRESS NOTES
HPI:    Patient ID: Lenin Nagy is a 83 year old male.  PCP: Dr Cagle    Memory Loss  The patient's primary symptoms include memory loss. Associated symptoms include dizziness.       Patient is a 83 year old male who presents with complaints of dizziness and gait imbalance.  He has chronic dizziness recently it has increased and had fallen twice in the last 2 weeks. Get dizzy with positional change, standing or walking, sometime room spinning sensation.   Last visit in Oct 2023 we recommended discussing with his primary care regarding reducing the dose of his antihypertensive. On Amlodipine 5 mg daily. Patient does not drink enough water due to increase urinary frequency due to prostate issues. His BP today is 90/64 and feels dizzy lightheaded. Appetite and weight is stable      Anup Scott is a 81 yo male with history of Prostate cancer 15 years ago, urinary incontinence, Lymphoma s/p chemotherapy, hypertension who presents for evaluation of dizziness. Patient reports dizziness and lightheadedness is intermittent, comes and goes and today he does not feel dizzy at all. He denies any vertigo. He was having some double vision looking to the left but not anymore. Wife reports his balance is poor and drifts to the right and has fallen in the past. He denies any tingling and numbness in feet. Had Chemo in 2021 for Lymphoma and cancer free for last 6 months. States recent PET scan was unremarkable  Wife states his memory is declining, getting more forgetful and confused. States during a hospital admission he got delirious and was seeing people. States no visual hallucinations at home. No family history of Dementia.     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    COLONOSCOPY  10/27/2020    DJP     HERNIA SURGERY      x2    LAPAROSCOPY, SURGICAL PROSTATECTOMY, RETROPUBIC RADICAL, W/NERVE SPARING  2008    OTHER  2021    Left inguinal lymph node biopsy    OTHER SURGICAL HISTORY  2020    VENTRAL INCISIONAL HERNIA REPAIR, PRIMARY, LEFT AXILLARY LYMPH NODE EXCISIONAL BIOPSY    REMOVAL GALLBLADDER      REPAIR ING HERNIA,5+Y/O,REDUCIBL      TONSILLECTOMY        Family History   Problem Relation Age of Onset    Cancer Mother 53        Brain    Colon Cancer Father 73    Cancer Brother 55        Mesothelioma      Social History     Socioeconomic History    Marital status:    Tobacco Use    Smoking status: Former     Packs/day: 0.50     Years: 14.00     Additional pack years: 0.00     Total pack years: 7.00     Types: Cigarettes     Start date: 1953     Quit date: 1967     Years since quittin.3    Smokeless tobacco: Never    Tobacco comments:     quit over 50 years ago   Vaping Use    Vaping Use: Never used   Substance and Sexual Activity    Alcohol use: Not Currently    Drug use: Never   Other Topics Concern    Caffeine Concern Yes     Comment: coffee half a cup and occ coke    Exercise Yes     Comment: PT   Social History Narrative    .  6  adult children.  Used to be an ,         Review of Systems   Constitutional: Negative.    HENT: Negative.     Eyes: Negative.    Respiratory: Negative.     Cardiovascular: Negative.    Gastrointestinal: Negative.    Endocrine: Negative.    Genitourinary: Negative.    Musculoskeletal:  Positive for gait problem.   Skin: Negative.    Allergic/Immunologic: Negative.    Neurological:  Positive for dizziness.   Hematological: Negative.    Psychiatric/Behavioral:  Positive for decreased concentration and memory loss.    All other systems reviewed and are negative.           Current Outpatient Medications   Medication Sig Dispense Refill    levothyroxine 25 MCG Oral Tab Take 1 tablet (25 mcg total) by mouth before breakfast. Lost medication-stolen override 90 tablet 0    amLODIPine 5 MG Oral Tab Take 1 tablet (5 mg total) by mouth daily. 90 tablet 1    escitalopram 5 MG Oral Tab Take 1 tablet (5 mg total) by mouth daily. 90 tablet 1    rivastigmine 4.6 MG/24HR Transdermal Patch 24 Hr Place 1 patch onto the skin daily. 30 patch 5    fluorouracil 5 % External Cream Apply to the affected areas twice daily for four weeks; during use the areas will feel irritated and look red and swollen      Nutritional Supplements (JUICE PLUS FIBRE OR) Take 1 tablet by mouth daily.      triamcinolone acetonide 0.1 % External Ointment Apply 1 Application topically daily as needed.        acetaminophen 500 MG Oral Tab Take 1 tablet (500 mg total) by mouth every 6 (six) hours as needed for Pain.       Allergies:  Allergies   Allergen Reactions    Emend [Aprepitant] HIVES     One single hive after completion of emend.     Other OTHER (SEE COMMENTS)     watery and itchy eyes- CATS    Penicillins RASH    Ragweed ITCHING and OTHER (SEE COMMENTS)     stuffiness     PHYSICAL EXAM:   Physical Exam  Blood pressure 90/64, pulse 60, resp. rate 16, weight 130 lb (59 kg).     General Appearance: Well nourished, well developed, no  apparent distress.   HEENT: Normocephalic and atraumatic  Cardiovascular: Normal rate, regular rhythm and normal heart sounds.    Pulmonary/Chest: Effort normal and breath sounds normal.   Abdominal: Soft. Bowel sounds are normal.   Skin: dry, clean and intact  Ext: peripheral pulses present  Psych: normal mood and affect    Neurological:  Patient is awake, alert and oriented to person, place and time   Speech is fluent with intact comprehension  MOCA 21/30  Visuospatial skills/executive 1/5  Naming 3/3  Attention 3/3  Calculation 33  Language 2/3  Abstraction 2/2  Recall 1/5  Orientation 6/6    Cranial Nerves:   II: Visual fields: normal  III: Pupils: equal, round, reactive to light  III,IV,VI: Extra Ocular Movements: intact  V: Facial sensation: intact  VII: Facial strength: intact  VIII: Hearing: Impaired hearing  IX: Palate: intact  XI: Shoulder shrug: intact  XII: Tongue movement: normal    Motor: Normal tone. Strength is  5 out of 5 in all extremities bilaterally.    Sensory: Sensory examination is normal to light touch and pinprick     Coordination: grossly intact    Gait: unsteady gait but no shuffling noted      TESTS/IMAGING:     MRI brain        Impression   CONCLUSION:       1. No evidence of an acute infarct.     2. Moderate FLAIR abnormalities in the white matter are likely sequelae of chronic small vessel ischemic disease.     3. Mild global parenchymal volume loss.            ASSESSMENT/PLAN:       ICD-10-CM    1. Mild dementia without behavioral disturbance, psychotic disturbance, mood disturbance, or anxiety, unspecified dementia type (HCC)  F03.A0       2. Dizziness  R42       3. Hypotension, unspecified hypotension type  I95.9           Dizziness likely due to hypotension. Orthostatic positive in the office  Reviewed PT note and agree there is no evidence of benign positional vertigo  On Amlodipine 5 mg daily, advised to hold the medication and discussed with Dr Tsering Rivera water and  electrolyte intake.  Monitor blood pressure closely at home    2. 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  Was switched to Exelon patch   Counseled on cognitive activities    Follow up in 3-6 months    See orders and medications filed with this encounter. The patient indicates understanding of these issues and agrees with the plan.          Maico Cortez MD  [facility] [place]      Meds This Visit:  Requested Prescriptions      No prescriptions requested or ordered in this encounter       Imaging & Referrals:  None     ID#[id number]

## 2024-03-19 ENCOUNTER — PATIENT OUTREACH (OUTPATIENT)
Dept: FAMILY MEDICINE CLINIC | Facility: CLINIC | Age: 84
End: 2024-03-19

## 2024-04-22 DIAGNOSIS — G30.9 ALZHEIMER'S DISEASE, UNSPECIFIED (HCC): ICD-10-CM

## 2024-04-22 DIAGNOSIS — F02.80 ALZHEIMER'S DISEASE, UNSPECIFIED (HCC): ICD-10-CM

## 2024-04-22 RX ORDER — ESCITALOPRAM OXALATE 5 MG/1
5 TABLET ORAL DAILY
Qty: 90 TABLET | Refills: 0 | OUTPATIENT
Start: 2024-04-22

## 2024-04-22 NOTE — TELEPHONE ENCOUNTER
NO PROTOCOL    OV 01/20/24  REFILL 01/20/24 #90 +1 RF    Future Appointments   Date Time Provider Department Center   5/13/2024  8:45 AM  PET DOSE RM1  PET [facility]   5/13/2024 10:00 AM  PET SCANNER  PET [facility]   5/15/2024 10:00 AM  TX RN6  CHEMO [facility]   5/15/2024 10:30 AM Misha Goodwin MD  HEM ONC [facility]

## 2024-04-27 DIAGNOSIS — E03.4 HYPOTHYROIDISM DUE TO ACQUIRED ATROPHY OF THYROID: ICD-10-CM

## 2024-04-29 RX ORDER — LEVOTHYROXINE SODIUM 0.03 MG/1
25 TABLET ORAL
Qty: 90 TABLET | Refills: 0 | Status: SHIPPED | OUTPATIENT
Start: 2024-04-29

## 2024-04-29 NOTE — TELEPHONE ENCOUNTER
Last refill: 01/29/24  Qty:  90  W/ 0 refills  Last ov: 01/20/24    Requested Prescriptions     Pending Prescriptions Disp Refills    LEVOTHYROXINE 25 MCG Oral Tab [Pharmacy Med Name: Levothyroxine Sodium 25 Mcg Tab Lupi] 90 tablet 0     Sig: Take 1 tablet (25 mcg total) by mouth before breakfast.     Future Appointments   Date Time Provider Department Center   5/13/2024  8:45 AM  PET DOSE RM1  [facility]   5/13/2024 10:00 AM  PET SCANNER  PET [facility]   5/15/2024 10:00 AM  [place] RN8  [facility]   5/15/2024 10:30 AM Misha Goodwin MD  [facility]

## 2024-05-13 ENCOUNTER — HOSPITAL ENCOUNTER (OUTPATIENT)
Dept: NUCLEAR MEDICINE | Facility: HOSPITAL | Age: 84
Discharge: HOME | End: 2024-05-13
Attending: INTERNAL MEDICINE

## 2024-05-13 ENCOUNTER — APPOINTMENT (OUTPATIENT)
Dept: DERMATOLOGY | Age: 84
End: 2024-05-13

## 2024-05-13 DIAGNOSIS — C84.48 PERIPHERAL T-CELL LYMPHOMA OF LYMPH NODES OF MULTIPLE REGIONS (HCC): ICD-10-CM

## 2024-05-13 LAB — GLUCOSE BLD-MCNC: 89 MG/DL (ref 70–99)

## 2024-05-13 PROCEDURE — 78815 PET IMAGE W/CT SKULL-THIGH: CPT | Performed by: INTERNAL MEDICINE

## 2024-05-13 PROCEDURE — 82962 GLUCOSE BLOOD TEST: CPT

## 2024-05-15 ENCOUNTER — NURSE ONLY (OUTPATIENT)
Dept: HEMATOLOGY/ONCOLOGY | Facility: HOSPITAL | Age: 84
End: 2024-05-15
Attending: INTERNAL MEDICINE

## 2024-05-15 VITALS
OXYGEN SATURATION: 97 % | RESPIRATION RATE: 18 BRPM | BODY MASS INDEX: 23.04 KG/M2 | SYSTOLIC BLOOD PRESSURE: 161 MMHG | DIASTOLIC BLOOD PRESSURE: 88 MMHG | WEIGHT: 126.81 LBS | HEIGHT: 62.01 IN | TEMPERATURE: 99 F

## 2024-05-15 DIAGNOSIS — D84.81 IMMUNODEFICIENCY DUE TO CONDITIONS CLASSIFIED ELSEWHERE (HCC): ICD-10-CM

## 2024-05-15 DIAGNOSIS — F02.80 ALZHEIMER'S DISEASE, UNSPECIFIED (HCC): ICD-10-CM

## 2024-05-15 DIAGNOSIS — Z86.718 HISTORY OF DVT (DEEP VEIN THROMBOSIS): ICD-10-CM

## 2024-05-15 DIAGNOSIS — R26.81 UNSTABLE GAIT: ICD-10-CM

## 2024-05-15 DIAGNOSIS — Z95.828 PORT-A-CATH IN PLACE: ICD-10-CM

## 2024-05-15 DIAGNOSIS — C84.48 PERIPHERAL T-CELL LYMPHOMA OF LYMPH NODES OF MULTIPLE REGIONS (HCC): ICD-10-CM

## 2024-05-15 DIAGNOSIS — C84.48 PERIPHERAL T-CELL LYMPHOMA OF LYMPH NODES OF MULTIPLE REGIONS (HCC): Primary | ICD-10-CM

## 2024-05-15 DIAGNOSIS — D72.810 LYMPHOPENIA: ICD-10-CM

## 2024-05-15 DIAGNOSIS — G30.9 ALZHEIMER'S DISEASE, UNSPECIFIED (HCC): ICD-10-CM

## 2024-05-15 LAB
ALBUMIN SERPL-MCNC: 3.7 G/DL (ref 3.4–5)
ALBUMIN/GLOB SERPL: 1.3 {RATIO} (ref 1–2)
ALP LIVER SERPL-CCNC: 91 U/L
ALT SERPL-CCNC: 18 U/L
ANION GAP SERPL CALC-SCNC: 5 MMOL/L (ref 0–18)
AST SERPL-CCNC: 18 U/L (ref 15–37)
BASOPHILS # BLD AUTO: 0.06 X10(3) UL (ref 0–0.2)
BASOPHILS NFR BLD AUTO: 1.4 %
BILIRUB SERPL-MCNC: 1 MG/DL (ref 0.1–2)
BUN BLD-MCNC: 15 MG/DL (ref 9–23)
CALCIUM BLD-MCNC: 8.7 MG/DL (ref 8.5–10.1)
CHLORIDE SERPL-SCNC: 112 MMOL/L (ref 98–112)
CO2 SERPL-SCNC: 24 MMOL/L (ref 21–32)
CREAT BLD-MCNC: 0.85 MG/DL
EGFRCR SERPLBLD CKD-EPI 2021: 87 ML/MIN/1.73M2 (ref 60–?)
EOSINOPHIL # BLD AUTO: 0.1 X10(3) UL (ref 0–0.7)
EOSINOPHIL NFR BLD AUTO: 2.3 %
ERYTHROCYTE [DISTWIDTH] IN BLOOD BY AUTOMATED COUNT: 12.8 %
GLOBULIN PLAS-MCNC: 2.8 G/DL (ref 2.8–4.4)
GLUCOSE BLD-MCNC: 93 MG/DL (ref 70–99)
HCT VFR BLD AUTO: 42 %
HGB BLD-MCNC: 15.2 G/DL
IMM GRANULOCYTES # BLD AUTO: 0.01 X10(3) UL (ref 0–1)
IMM GRANULOCYTES NFR BLD: 0.2 %
LDH SERPL L TO P-CCNC: 203 U/L
LYMPHOCYTES # BLD AUTO: 0.66 X10(3) UL (ref 1–4)
LYMPHOCYTES NFR BLD AUTO: 15.1 %
MCH RBC QN AUTO: 33.9 PG (ref 26–34)
MCHC RBC AUTO-ENTMCNC: 36.2 G/DL (ref 31–37)
MCV RBC AUTO: 93.5 FL
MONOCYTES # BLD AUTO: 0.34 X10(3) UL (ref 0.1–1)
MONOCYTES NFR BLD AUTO: 7.8 %
NEUTROPHILS # BLD AUTO: 3.2 X10 (3) UL (ref 1.5–7.7)
NEUTROPHILS # BLD AUTO: 3.2 X10(3) UL (ref 1.5–7.7)
NEUTROPHILS NFR BLD AUTO: 73.2 %
OSMOLALITY SERPL CALC.SUM OF ELEC: 293 MOSM/KG (ref 275–295)
PLATELET # BLD AUTO: 145 10(3)UL (ref 150–450)
POTASSIUM SERPL-SCNC: 3.8 MMOL/L (ref 3.5–5.1)
PROT SERPL-MCNC: 6.5 G/DL (ref 6.4–8.2)
RBC # BLD AUTO: 4.49 X10(6)UL
SODIUM SERPL-SCNC: 141 MMOL/L (ref 136–145)
WBC # BLD AUTO: 4.4 X10(3) UL (ref 4–11)

## 2024-05-15 PROCEDURE — 83615 LACTATE (LD) (LDH) ENZYME: CPT

## 2024-05-15 PROCEDURE — 80053 COMPREHEN METABOLIC PANEL: CPT

## 2024-05-15 PROCEDURE — G2211 COMPLEX E/M VISIT ADD ON: HCPCS | Performed by: INTERNAL MEDICINE

## 2024-05-15 PROCEDURE — 85025 COMPLETE CBC W/AUTO DIFF WBC: CPT

## 2024-05-15 PROCEDURE — 36591 DRAW BLOOD OFF VENOUS DEVICE: CPT

## 2024-05-15 PROCEDURE — 99214 OFFICE O/P EST MOD 30 MIN: CPT | Performed by: INTERNAL MEDICINE

## 2024-05-15 NOTE — PROGRESS NOTES
Outpatient Oncology Care Plan  Problem list:  Knowledge deficit  Fatigue     Problems related to:    chemotherapy  disease/disease progression  side effect of treatment     Interventions:  provided general teaching     Expected outcomes:  understands plan of care     Progress towards outcome:  making progress     Education Record     Learner:  Patient  Barriers / Limitations:  None  Method:  Brief focused  Outcome:  Shows understanding  Comments: 3 month MD f/up T-cell lymphoma. Pt states he is feeling fatigued. Denies any updates to med or hx. denies any fevers, night sweats. Looking to discuss PET.

## 2024-05-15 NOTE — PROGRESS NOTES
Education Record    Learner:  Patient    Disease / Diagnosis: Here for Port labs.     Barriers / Limitations:  None    Method:  Brief focused, printed material and  reinforcement    General Topics:  Plan of care reviewed    Outcome:  Shows understanding. Pt port working well. Left in stable condition to see MD.

## 2024-05-15 NOTE — H&P (VIEW-ONLY)
Hematology/Oncology Clinic Follow Up Visit    Patient Name: Lenin Nagy  Medical Record Number: [id number]   YOB: 1940    PCP: Dr. Salas Cagle  Other providers: Dr. Ruslan Ortega, Dr. Elkin Weaver, Dr. Fritz Brewer, Dr. 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  No bowel herniation. There are mildly enlarged periaortic lymph nodes seen below the left renal hilum.  The largest lymph node is a new finding compared to a previous CT 10/9/2012.   -11/11/20- underwent ventral incisional herniorrhaphy, left axillary lymph node excisional biopsy (Dr. Elkin Weaver)- pathology of the left axillary lymph node showed multiple lymph nodes with nonspecific reactive features, no evidence of malignancy, flow cytometry is normal with polytypic B-cell expression.  -11/24/20- gallium PET/CT- There is nonspecific patchy low level uptake of the radiopharmaceutical within numerous lymph nodes in the lower neck, supraclavicular regions, mediastinum, nir, axillary regions, retropectoral regions, along both groins, and along the   external iliac chains.  These findings are nonspecific and clinical correlation is recommended.   12/15/20- CT guided bx of left external iliac lymph node- procedure aborted due to CT imaging showing improvement.  The left external iliac lymph node has decreased in size and currently measures 1.6 x 0.7 cm.  On prior CT this measured 2.7 x 1.6 cm.  No biopsy was performed.  -6/11/21- CT c/a/p- new enlarged retroperitoneal lymph node anterior to the IVC measuring up to 5.5 cm.  Other lymph nodes previously seen are small and stable or decreased in size  -7/6/21- EUS FNB of retroperitoneal LN (Dr. Brewer)- atypical lymphoid infiltrate, worrisome for involvement by lymphoma.   Reviewed at [facility].  The biopsy shows an infiltrate of T cells which stain as CD3 positive T cells and coexpress CD2, CD5 and variable CD30.  CD4 and CD8 do not appear to be coexpressed and CD7 is not expressed.  Slightly increased numbers of background CD20 and PAX 5+ B cells are also noted; some appear larger than normal but there is a spectrum of lymphocytes size within the CD20 positive B cells.  Cytokeratin AE1/AE3 is negative.  Due to the challenging aspects of the case is sent to [facility] for consultation.  Gene rearrangement analysis was performed on the paraffin block and showed an equivocal T-cell receptor gene rearrangement analysis.  No clonal immunoglobulin gene rearrangement was detected. Overall the findings are worrisome for lymphoma, particularly T-cell lymphoma, however the diagnosis cannot be confirmed on this biopsy material.  -7/22/21- PET/CT- Enlarged retroperitoneal lymph node just anterior to vena cava has increased FDG avidity with max SUV 6.7. Moderate increased metabolic activity is evident in a single left inguinal lymph node with maximum SUV of 5.8, however, this LN is not enlarged and demonstrates normal lymph node architecture. Other lymph nodes scattered throughout chest, abdomen and pelvis do not demonstrate any significant increased metabolic activity and are not pathologically enlarged.     -8/5/21- CT guided bx of RP LN/mass- atypical lymphohistiocytic infiltrate, worrisome for lymphoma.  Reviewed at [facility]. The findings remain concerning for lymphoma, particularly T-cell lymphoma. This appears to be a complex process by morphology and immunoarchitecture and therefore small biopsies, which do not widely sample the process are unlikely to result in adequate tissue for definitive diagnosis. Consider larger tissue biopsy.   Histologic sections show a lymphohistiocytic infiltrate composed of lymphoid cells with small nuclei, slightly irregular nuclear contours and  condensed chromatin intermixed with numerous histiocytes and scattered intermediate sized immunoblastic cells. There are few intermixed eosinophils.   Immunoperoxidase stains were performed at [facility] in [place], [place] (block A1: CD3, CD4, CD7, CD8, CD20, CD21, CD30,  (PD-1), CXCL13, ICOS, TCR betaF1, TCR delta). In situ hybridization for Fabiano-Barr virus encoded RNA (AMADOU) was performed at [facility].   There are modest numbers of T-cells with slight nuclear irregularities that stained for CD3, TCR betaF1,  (PD-1), ICOS and CXCL13 (partial). There are similar numbers of CD4 positive cells and CD8 positive cells. TCR delta positive T-cells are not increased. The T-cells are negative for all other markers tested. CD20 stains B-cells in loose aggregates and scattered interstitially. CD4 weakly stains numerous intermixed histiocytes. CD21 highlights scattered follicular dendritic cell meshworks. AMADOU and CD30 stains are negative.   T-cell receptor gene rearrangement, paraffin embedded tissue, performed at [facility] in [place], [place] ([id number], block A1): Invalid study due to inadequate amplification of T-cell receptor target DNA and internal control. This suggests that inhibitors of PCR or minimal target and total DNA are present.  -8/31/21-left inguinal lymph node surgical excision (Dr. Ortega)- pathology consistent with peripheral T-cell lymphoma, not otherwise specified.  Pathology reviewed at [facility]. While this T-cell lymphoma shares many immunomorphologic features with angioimmunoblastic T-cell lymphoma (AITL), because it lacks definitive CD4 expression it is best classified as peripheral T-cell lymphoma, not otherwise specified (PTCL-NOS). Given these AITL-like features, it is possible that this lymphoma may show clinical features similar to AITL.    Histologic sections show portions of multiple lymph nodes partially effaced by an atypical lymphoid infiltrate comprised of intermediate-sized  lymphocytes with irregular nuclei, vesicular chromatin, and abundant pale cytoplasm in a polymorphous background of small lymphocytes, eosinophils, small vessels, and scattered immunoblasts.   The neoplastic cells liborio as CD3-positive T-cells that co-express CD2, CD5, , CD10 (subset weak), BCL6 (subset), CXCL13, ICOS, CD30 (partial), TCR beta F1, and are negative for the remaining markers tested, including CD4 and CD8. The background shows scattered EBV-positive cells. Underlying follicular dendritic cell meshworks are focally expanded, highlighted by CD21. Background plasma cells overall show polytypic staining by kappa/lambda with focal areas showing skewing towards both lambda and kappa. The neoplastic infiltrate is best visualized on block A1 with only a focal fragment involved in block A2, obscured by crush artifact.   Flow cytometric analysis: CD19-positive B-cells show a kappa/lambda ratio of 1.3. CD3-positive T-cells show a CD4/CD8 ratio of 2.0.   Molecular studies, T-cell receptor gene rearrangement analysis, left inguinal lymph node, performed at [place], [place] ([id number]; [id number]; block A1, 8/31/2021): Positive. Clonal T-cell receptor gene rearrangement was detected.   -9/30/21- PET/CT- All of the lymph nodes listed below are subcentimeter in size. Uptake values are given in SUV maximum.   Right lower neck 2.7     Left supraclavicular 2.5     Another left supraclavicular 1.5     Upper left axillary 2.9     Lower left axilla 2.3     Right axillary scattered subcentimeter nodes, greatest uptake 2.9.   Left internal mammary 2.7     Subcarinal 4.5     Right hilar 4.5     AP window 3.4     Left hilum 3.3     Lower left hilum 2.5     Previously identified enlarged lymph node in the retroperitoneum anterior to the vena cava is no longer identified.  Numerous clips are present within the left groin, and stranding and possibly some fluid is region postsurgical in nature.  Uptake in the left  groin 5.6 SUV maximum.  Subcentimeter lymph nodes in the right groin without any surgery seen the right groin SUV max 2.6.  -10/1/21- bmbx-mildly hypercellular bone marrow with active trilineage hematopoiesis.  No definite evidence of lymphoma.  No increased blasts.  No significant dysplastic changes.  Flow cytometry and T-cell receptor gene rearrangement shows no monotypic lymphocyte population.  Normal karyotype.   -10/6/21- cycle 1 BV-CHP (dose modified given age/frailty- brentuximab vedotin 1.8 mg/kg, Cyclophosphamide 400mg/m2 IV, Doxorubicin 25mg/m2 IV all D1, Prednisone 100mg PO D1-5, pegfilgrastim D2; q21 days)  -10/28/21- cycle 2 BV-CHP  -11/18/21- cycle 3 BV-CHP  -12/7/21- PET/CT- Deauville score of 1, with resolution of lymph node uptake previously demonstrated, and decrease in the size of associated lymph nodes.  Concern for atypical pneumonia. New areas of pulmonary parenchymal FDG activity, mostly on the left, but also on the right, corresponding to areas of ground-glass opacity and lung consolidation, with the greatest level of uptake within the superior segment left lower lobe, showing SUV maximum 6.7.  This corresponds to a more dense area of ground-glass disease or developing lung consolidation.  Other areas have more clearly ground-glass opacity and have elevated although lesser degrees of elevation of activity.  These do not appear typical for neoplasm and are felt to be reflecting atypical pneumonia.     -Delayed cycle 4 due to severe fatigue and concern for pulmonary infiltrates.  Completed 21-day course of empiric therapeutic Bactrim for possible PJP and 10-day course of Levaquin for possible PNA  -1/5/22- CT chest- Overall there has been improvement in patchy bilateral ground-glass opacities within the lungs, now only slight residual airspace opacities present.  -1/6/22- cycle 4 BV-CHP (was delayed 4 weeks d/t severe fatigue and development of pulm infiltrates- PJP vs other PNA vs  pneumonitis?)   -dose reduced brentuximab to 1.4mg/m2 d/t possible pneumonitis and fatigue)   -1/27/22- cycle 5 BV-CHP  -1/27/22- LLE venous doppler- superficial thrombophlebitis involving the greater saphenous vein as well as extension into the popliteal vein.     -started Eliquis  -2/17/22- cycle 6 BV-CHP  -3/9/22- PET/CT- complete response, Deauville score= 1. No recurrent abnormal lymph node activity seen.  Resolution of pneumonia.   -4/26/22- LLE venous doppler- Diffuse left greater saphenous vein thrombus noted previously is unchanged.  Partial thrombus in the left popliteal vein noted previously has resolved.  There is no new DVT.  -7/25/22- PET/CT- There is no evidence of lymphoma within the neck, chest, abdomen, pelvis.  -2/7/23- PET/CT- multiple areas of increased FDG uptake noted, though does not appear to localize to any particular structure and felt to likely represent artifact. No mass or enlarged lymph nodes are seen.  -5/17/23- PET/CT- no evidence of lymphoma.  Deauville score 1.  -8/11/23- MRI brain- No evidence of an acute infarct.  Moderate FLAIR abnormalities in the white matter are likely sequelae of chronic small vessel ischemic disease.  Mild global parenchymal volume loss.   -11/13/23- PET/CT- no evidence of recurrent lymphoma.  Deauville score remains 1.  -5/13/24- PET/CT- No recurrent abnormal activity.  Deauville score 1.     ===============================  Interval events: Presents for follow-up.  His wife accompanies him to his visit today.      He remains on rivastigmine patch for history of dementia.  He follows with neurology.  His wife feels his memory has been stable over the last several months.  His wife reports that he has had some hallucinations at night and periods of paranoia.  They have not yet discussed these issues with his neurologist.    Has ongoing fatigue, takes a lot of naps.  He denies any new aches or pains.  Continues to have poor balance, has had some falls.  He  previously worked with physical therapy, he is supposed to be using a cane but does not.    Chronic dyspnea with exertion remains stable.      Urinary frequency with intermittent incontinence is unchanged.  He previously went for pelvic floor therapy and saw urology but has not followed up with them lately.    No recent fevers, infections, or drenching night sweats.     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    Colonoscopy  10/27/2020    DJP     Hernia surgery      x2    Laparoscopy, surgical prostatectomy, retropubic radical, w/nerve sparing  2008    Other  08/31/2021    Left inguinal lymph node biopsy    Other surgical history  11/11/2020    VENTRAL INCISIONAL HERNIA REPAIR, PRIMARY, LEFT AXILLARY LYMPH NODE EXCISIONAL BIOPSY    Removal gallbladder      Repair ing hernia,5+y/o,reducibl      Tonsillectomy       Home Medications:   LEVOTHYROXINE 25 MCG Oral Tab Take 1 tablet (25 mcg total) by mouth before breakfast. 90 tablet 0    amLODIPine 5 MG Oral Tab Take 1 tablet (5 mg total) by mouth daily. 90  tablet 1    escitalopram 5 MG Oral Tab Take 1 tablet (5 mg total) by mouth daily. 90 tablet 1    rivastigmine 4.6 MG/24HR Transdermal Patch 24 Hr Place 1 patch onto the skin daily. 30 patch 5    fluorouracil 5 % External Cream Apply to the affected areas twice daily for four weeks; during use the areas will feel irritated and look red and swollen      Nutritional Supplements (JUICE PLUS FIBRE OR) Take 1 tablet by mouth daily.      triamcinolone acetonide 0.1 % External Ointment Apply 1 Application topically daily as needed.        acetaminophen 500 MG Oral Tab Take 1 tablet (500 mg total) by mouth every 6 (six) hours as needed for Pain.       Allergies:   Allergies   Allergen Reactions    Emend [Aprepitant] HIVES     One single hive after completion of emend.     Other OTHER (SEE COMMENTS)     watery and itchy eyes- CATS    Penicillins RASH    Ragweed ITCHING and OTHER (SEE COMMENTS)     stuffiness       Psychosocial History:  Social History     Social History Narrative    .  6 adult children.  Used to be an ,      Social History     Socioeconomic History    Marital status:    Tobacco Use    Smoking status: Former     Current packs/day: 0.00     Average packs/day: 0.5 packs/day for 14.9 years (7.4 ttl pk-yrs)     Types: Cigarettes     Start date: 1953     Quit date: 1967     Years since quittin.4    Smokeless tobacco: Never    Tobacco comments:     quit over 50 years ago   Vaping Use    Vaping status: Never Used   Substance and Sexual Activity    Alcohol use: Not Currently    Drug use: Never   Other Topics Concern    Caffeine Concern Yes     Comment: coffee half a cup and occ coke    Exercise Yes     Comment: PT   Social History Narrative    .  6 adult children.  Used to be an ,      Social Determinants of Health      [facility]    Social Connections     Family  Medical History:  Family History   Problem Relation Age of Onset    Cancer Mother 53        Brain    Colon Cancer Father 73    Cancer Brother 55        Mesothelioma     Review of Systems:  A 10-point ROS was done with pertinent positives and negative per the HPI    Vital Signs:  Height: 157.5 cm (5' 2.01\") (05/15 1010)  Weight: 57.5 kg (126 lb 12.8 oz) (05/15 1010)  BSA (Calculated - sq m): 1.58 sq meters (05/15 1010)  Pulse: --  BP: 161/88 (05/15 1010)  Temp: 98.6 °F (37 °C) (05/15 1010)  Do Not Use - Resp Rate: --  SpO2: 97 % (05/15 1010)    Wt Readings from Last 6 Encounters:   05/15/24 57.5 kg (126 lb 12.8 oz)   03/15/24 59 kg (130 lb)   02/14/24 58.2 kg (128 lb 6.4 oz)   01/20/24 56.9 kg (125 lb 6 oz)   11/16/23 58.2 kg (128 lb 6.4 oz)   10/20/23 59.4 kg (131 lb)     ECOG PS: 3    Physical Examination:  General: Patient is alert and oriented, not in acute distress.  +Impaired short-term memory  Psych: Mood and affect are appropriate  Eyes: EOMI  ENT: Oropharynx is clear  CV: Regular rate and rhythm, no murmurs  Respiratory: Lungs clear to auscultation bilaterally  GI/Abd: Soft, non-tender with normoactive bowel sounds, no hepatosplenomegaly  Neurological: Grossly intact.  Unsteady gait.  Lymphatics: No palpable lymphadenopathy.  Skin: no rashes or petechiae  Ext: no LE edema today  Lines: +portacath- no erythema, induration, or drainage    Laboratory:  Lab Results   Component Value Date    WBC 4.4 05/15/2024    WBC 4.5 02/14/2024    WBC 3.8 (L) 11/16/2023    HGB 15.2 05/15/2024    HGB 14.6 02/14/2024    HGB 14.3 11/16/2023    HCT 42.0 05/15/2024    MCV 93.5 05/15/2024    MCH 33.9 05/15/2024    MCHC 36.2 05/15/2024    RDW 12.8 05/15/2024    .0 (L) 05/15/2024    .0 02/14/2024    .0 11/16/2023     Lab Results   Component Value Date    GLU 93 05/15/2024    BUN 15 05/15/2024    BUNCREA 19.8 08/02/2021    CREATSERUM 0.85 05/15/2024    CREATSERUM 0.82 02/14/2024    CREATSERUM 0.95 01/20/2024     ANIONGAP 5 05/15/2024    GFRNAA 83 07/27/2022    GFRAA 96 07/27/2022    CA 8.7 05/15/2024    OSMOCALC 293 05/15/2024    ALKPHO 91 05/15/2024    AST 18 05/15/2024    ALT 18 05/15/2024    BILT 1.0 05/15/2024    TP 6.5 05/15/2024    ALB 3.7 05/15/2024    GLOBULIN 2.8 05/15/2024     05/15/2024    K 3.8 05/15/2024     05/15/2024    CO2 24.0 05/15/2024     Lab Results   Component Value Date    INR 1.0 10/04/2021     Lab Results   Component Value Date     05/15/2024     02/14/2024     11/16/2023     08/17/2023     04/15/2023     01/13/2023     10/26/2022     07/27/2022     04/28/2022     03/10/2022     02/17/2022     (H) 01/27/2022     01/06/2022     (H) 12/30/2021     (H) 12/16/2021     (H) 12/09/2021     11/18/2021     10/28/2021     10/13/2021     10/06/2021     09/22/2021     08/02/2021     12/23/2020     Lab Results   Component Value Date    ESRML 8 12/23/2020     Lab Results   Component Value Date    CRP <0.29 12/23/2020     Lab Results   Component Value Date    PSA <0.01 01/13/2023    PSA <0.01 08/02/2021    PSA 0.01 11/24/2020     Component      Latest Ref Rng & Units 9/22/2021   Hbsag Screen Index       <0.10   HBSAg Screen      Nonreactive  Nonreactive   HEPATITIS B SURFACE AB QUAL      Nonreactive  Nonreactive   HEPATITIS B SURFACE AB QUANT      mIU/mL <3.10   HEPATITIS B CORE AB, TOTAL      Nonreactive  Nonreactive   HCV AB      Nonreactive  Nonreactive   HIV Antigen Antibody Combo      Non-Reactive Non-Reactive     Imaging:    TTE 9/25/21: Conclusions:   1. Left ventricle: The cavity size was normal. Wall thickness was normal. The estimated ejection fraction was 60-65%. No regional wall motion abnormalities. Doppler parameters are consistent with abnormal left ventricular relaxation - grade 1 diastolic dysfunction.   2. Aortic valve:  Trivial regurgitation.   3. Mitral valve: Mildly calcified annulus. Mitral valve demonstrates mildly thickened leaflets. There was mild regurgitation.   4. Left atrium: The left atrium was mildly dilated.   5. 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  Completed BV-CHP (dose reduced CHP as per \"mini-CHOP\" protocol) x 6 cycles 3/2022.  His end of treatment PET scan showed Deauville 1, complete response.    -He is now 2 years out from end of treatment - no evidence of recurrent disease at this time.    -Continue surveillance monitoring as below:   -Clinic visit with CBC, CMP, LDH q6mths until 5 yrs out   -No further routine imaging surveillance is needed  -Order placed for IR Port-A-Cath removal    *Lymphopenia, immunodeficiency  -Related to lymphoma and prior chemotherapy.    -Continue to stay up-to-date with recommended vaccinations  -Repeat CBC with differential in 6 months     *LLE popliteal DVT and GSV thrombosis, dx'd 1/27/22  -completed 3 months of anticoagulation.  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 Remains off anticoagulation, no s/s of recurrence/progression.    *Gait instability  -Previously worked with physical therapy and was doing somewhat better.  He was previously recommended to use a cane which I reiterated today.      *Urinary incontinence  -Chronic since prior prostatectomy.  Following with urology, previously had pelvic floor therapy.  I encouraged him to follow-up with urology again if this continues to bother him    *Dementia  -Currently on rivastigmine per neurology.  -Has had some issues with paranoia and hallucinations, advised them to discuss this further with his neurologist    RTC in 6 months    Misha Goodwin MD  Hematology/Medical Oncology  [facility]    Risk level: High- T-cell lymphoma with multiple other comorbidities as detailed above requiring close  monitoring

## 2024-05-15 NOTE — PROGRESS NOTES
Hematology/Oncology Clinic Follow Up Visit    Patient Name: Lenin Nagy  Medical Record Number: [id number]   YOB: 1940    PCP: Dr. Salas Cagle  Other providers: Dr. Ruslan Ortega, Dr. Elkin Weaver, Dr. Fritz Brewer, Dr. 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  No bowel herniation. There are mildly enlarged periaortic lymph nodes seen below the left renal hilum.  The largest lymph node is a new finding compared to a previous CT 10/9/2012.   -11/11/20- underwent ventral incisional herniorrhaphy, left axillary lymph node excisional biopsy (Dr. Elkin Weaver)- pathology of the left axillary lymph node showed multiple lymph nodes with nonspecific reactive features, no evidence of malignancy, flow cytometry is normal with polytypic B-cell expression.  -11/24/20- gallium PET/CT- There is nonspecific patchy low level uptake of the radiopharmaceutical within numerous lymph nodes in the lower neck, supraclavicular regions, mediastinum, nir, axillary regions, retropectoral regions, along both groins, and along the   external iliac chains.  These findings are nonspecific and clinical correlation is recommended.   12/15/20- CT guided bx of left external iliac lymph node- procedure aborted due to CT imaging showing improvement.  The left external iliac lymph node has decreased in size and currently measures 1.6 x 0.7 cm.  On prior CT this measured 2.7 x 1.6 cm.  No biopsy was performed.  -6/11/21- CT c/a/p- new enlarged retroperitoneal lymph node anterior to the IVC measuring up to 5.5 cm.  Other lymph nodes previously seen are small and stable or decreased in size  -7/6/21- EUS FNB of retroperitoneal LN (Dr. Brewer)- atypical lymphoid infiltrate, worrisome for involvement by lymphoma.   Reviewed at [facility].  The biopsy shows an infiltrate of T cells which stain as CD3 positive T cells and coexpress CD2, CD5 and variable CD30.  CD4 and CD8 do not appear to be coexpressed and CD7 is not expressed.  Slightly increased numbers of background CD20 and PAX 5+ B cells are also noted; some appear larger than normal but there is a spectrum of lymphocytes size within the CD20 positive B cells.  Cytokeratin AE1/AE3 is negative.  Due to the challenging aspects of the case is sent to [facility] for consultation.  Gene rearrangement analysis was performed on the paraffin block and showed an equivocal T-cell receptor gene rearrangement analysis.  No clonal immunoglobulin gene rearrangement was detected. Overall the findings are worrisome for lymphoma, particularly T-cell lymphoma, however the diagnosis cannot be confirmed on this biopsy material.  -7/22/21- PET/CT- Enlarged retroperitoneal lymph node just anterior to vena cava has increased FDG avidity with max SUV 6.7. Moderate increased metabolic activity is evident in a single left inguinal lymph node with maximum SUV of 5.8, however, this LN is not enlarged and demonstrates normal lymph node architecture. Other lymph nodes scattered throughout chest, abdomen and pelvis do not demonstrate any significant increased metabolic activity and are not pathologically enlarged.     -8/5/21- CT guided bx of RP LN/mass- atypical lymphohistiocytic infiltrate, worrisome for lymphoma.  Reviewed at [facility]. The findings remain concerning for lymphoma, particularly T-cell lymphoma. This appears to be a complex process by morphology and immunoarchitecture and therefore small biopsies, which do not widely sample the process are unlikely to result in adequate tissue for definitive diagnosis. Consider larger tissue biopsy.   Histologic sections show a lymphohistiocytic infiltrate composed of lymphoid cells with small nuclei, slightly irregular nuclear contours and  condensed chromatin intermixed with numerous histiocytes and scattered intermediate sized immunoblastic cells. There are few intermixed eosinophils.   Immunoperoxidase stains were performed at [facility] in [place], [place] (block A1: CD3, CD4, CD7, CD8, CD20, CD21, CD30,  (PD-1), CXCL13, ICOS, TCR betaF1, TCR delta). In situ hybridization for Fabiano-Barr virus encoded RNA (AMADOU) was performed at [facility].   There are modest numbers of T-cells with slight nuclear irregularities that stained for CD3, TCR betaF1,  (PD-1), ICOS and CXCL13 (partial). There are similar numbers of CD4 positive cells and CD8 positive cells. TCR delta positive T-cells are not increased. The T-cells are negative for all other markers tested. CD20 stains B-cells in loose aggregates and scattered interstitially. CD4 weakly stains numerous intermixed histiocytes. CD21 highlights scattered follicular dendritic cell meshworks. AMADOU and CD30 stains are negative.   T-cell receptor gene rearrangement, paraffin embedded tissue, performed at [facility] in [place], [place] ([id number], block A1): Invalid study due to inadequate amplification of T-cell receptor target DNA and internal control. This suggests that inhibitors of PCR or minimal target and total DNA are present.  -8/31/21-left inguinal lymph node surgical excision (Dr. Ortega)- pathology consistent with peripheral T-cell lymphoma, not otherwise specified.  Pathology reviewed at [facility]. While this T-cell lymphoma shares many immunomorphologic features with angioimmunoblastic T-cell lymphoma (AITL), because it lacks definitive CD4 expression it is best classified as peripheral T-cell lymphoma, not otherwise specified (PTCL-NOS). Given these AITL-like features, it is possible that this lymphoma may show clinical features similar to AITL.    Histologic sections show portions of multiple lymph nodes partially effaced by an atypical lymphoid infiltrate comprised of intermediate-sized  lymphocytes with irregular nuclei, vesicular chromatin, and abundant pale cytoplasm in a polymorphous background of small lymphocytes, eosinophils, small vessels, and scattered immunoblasts.   The neoplastic cells liborio as CD3-positive T-cells that co-express CD2, CD5, , CD10 (subset weak), BCL6 (subset), CXCL13, ICOS, CD30 (partial), TCR beta F1, and are negative for the remaining markers tested, including CD4 and CD8. The background shows scattered EBV-positive cells. Underlying follicular dendritic cell meshworks are focally expanded, highlighted by CD21. Background plasma cells overall show polytypic staining by kappa/lambda with focal areas showing skewing towards both lambda and kappa. The neoplastic infiltrate is best visualized on block A1 with only a focal fragment involved in block A2, obscured by crush artifact.   Flow cytometric analysis: CD19-positive B-cells show a kappa/lambda ratio of 1.3. CD3-positive T-cells show a CD4/CD8 ratio of 2.0.   Molecular studies, T-cell receptor gene rearrangement analysis, left inguinal lymph node, performed at [place], [place] ([id number]; [id number]; block A1, 8/31/2021): Positive. Clonal T-cell receptor gene rearrangement was detected.   -9/30/21- PET/CT- All of the lymph nodes listed below are subcentimeter in size. Uptake values are given in SUV maximum.   Right lower neck 2.7     Left supraclavicular 2.5     Another left supraclavicular 1.5     Upper left axillary 2.9     Lower left axilla 2.3     Right axillary scattered subcentimeter nodes, greatest uptake 2.9.   Left internal mammary 2.7     Subcarinal 4.5     Right hilar 4.5     AP window 3.4     Left hilum 3.3     Lower left hilum 2.5     Previously identified enlarged lymph node in the retroperitoneum anterior to the vena cava is no longer identified.  Numerous clips are present within the left groin, and stranding and possibly some fluid is region postsurgical in nature.  Uptake in the left  groin 5.6 SUV maximum.  Subcentimeter lymph nodes in the right groin without any surgery seen the right groin SUV max 2.6.  -10/1/21- bmbx-mildly hypercellular bone marrow with active trilineage hematopoiesis.  No definite evidence of lymphoma.  No increased blasts.  No significant dysplastic changes.  Flow cytometry and T-cell receptor gene rearrangement shows no monotypic lymphocyte population.  Normal karyotype.   -10/6/21- cycle 1 BV-CHP (dose modified given age/frailty- brentuximab vedotin 1.8 mg/kg, Cyclophosphamide 400mg/m2 IV, Doxorubicin 25mg/m2 IV all D1, Prednisone 100mg PO D1-5, pegfilgrastim D2; q21 days)  -10/28/21- cycle 2 BV-CHP  -11/18/21- cycle 3 BV-CHP  -12/7/21- PET/CT- Deauville score of 1, with resolution of lymph node uptake previously demonstrated, and decrease in the size of associated lymph nodes.  Concern for atypical pneumonia. New areas of pulmonary parenchymal FDG activity, mostly on the left, but also on the right, corresponding to areas of ground-glass opacity and lung consolidation, with the greatest level of uptake within the superior segment left lower lobe, showing SUV maximum 6.7.  This corresponds to a more dense area of ground-glass disease or developing lung consolidation.  Other areas have more clearly ground-glass opacity and have elevated although lesser degrees of elevation of activity.  These do not appear typical for neoplasm and are felt to be reflecting atypical pneumonia.     -Delayed cycle 4 due to severe fatigue and concern for pulmonary infiltrates.  Completed 21-day course of empiric therapeutic Bactrim for possible PJP and 10-day course of Levaquin for possible PNA  -1/5/22- CT chest- Overall there has been improvement in patchy bilateral ground-glass opacities within the lungs, now only slight residual airspace opacities present.  -1/6/22- cycle 4 BV-CHP (was delayed 4 weeks d/t severe fatigue and development of pulm infiltrates- PJP vs other PNA vs  pneumonitis?)   -dose reduced brentuximab to 1.4mg/m2 d/t possible pneumonitis and fatigue)   -1/27/22- cycle 5 BV-CHP  -1/27/22- LLE venous doppler- superficial thrombophlebitis involving the greater saphenous vein as well as extension into the popliteal vein.     -started Eliquis  -2/17/22- cycle 6 BV-CHP  -3/9/22- PET/CT- complete response, Deauville score= 1. No recurrent abnormal lymph node activity seen.  Resolution of pneumonia.   -4/26/22- LLE venous doppler- Diffuse left greater saphenous vein thrombus noted previously is unchanged.  Partial thrombus in the left popliteal vein noted previously has resolved.  There is no new DVT.  -7/25/22- PET/CT- There is no evidence of lymphoma within the neck, chest, abdomen, pelvis.  -2/7/23- PET/CT- multiple areas of increased FDG uptake noted, though does not appear to localize to any particular structure and felt to likely represent artifact. No mass or enlarged lymph nodes are seen.  -5/17/23- PET/CT- no evidence of lymphoma.  Deauville score 1.  -8/11/23- MRI brain- No evidence of an acute infarct.  Moderate FLAIR abnormalities in the white matter are likely sequelae of chronic small vessel ischemic disease.  Mild global parenchymal volume loss.   -11/13/23- PET/CT- no evidence of recurrent lymphoma.  Deauville score remains 1.  -5/13/24- PET/CT- No recurrent abnormal activity.  Deauville score 1.     ===============================  Interval events: Presents for follow-up.  His wife accompanies him to his visit today.      He remains on rivastigmine patch for history of dementia.  He follows with neurology.  His wife feels his memory has been stable over the last several months.  His wife reports that he has had some hallucinations at night and periods of paranoia.  They have not yet discussed these issues with his neurologist.    Has ongoing fatigue, takes a lot of naps.  He denies any new aches or pains.  Continues to have poor balance, has had some falls.  He  previously worked with physical therapy, he is supposed to be using a cane but does not.    Chronic dyspnea with exertion remains stable.      Urinary frequency with intermittent incontinence is unchanged.  He previously went for pelvic floor therapy and saw urology but has not followed up with them lately.    No recent fevers, infections, or drenching night sweats.     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    Colonoscopy  10/27/2020    DJP     Hernia surgery      x2    Laparoscopy, surgical prostatectomy, retropubic radical, w/nerve sparing  2008    Other  08/31/2021    Left inguinal lymph node biopsy    Other surgical history  11/11/2020    VENTRAL INCISIONAL HERNIA REPAIR, PRIMARY, LEFT AXILLARY LYMPH NODE EXCISIONAL BIOPSY    Removal gallbladder      Repair ing hernia,5+y/o,reducibl      Tonsillectomy       Home Medications:   LEVOTHYROXINE 25 MCG Oral Tab Take 1 tablet (25 mcg total) by mouth before breakfast. 90 tablet 0    amLODIPine 5 MG Oral Tab Take 1 tablet (5 mg total) by mouth daily. 90  tablet 1    escitalopram 5 MG Oral Tab Take 1 tablet (5 mg total) by mouth daily. 90 tablet 1    rivastigmine 4.6 MG/24HR Transdermal Patch 24 Hr Place 1 patch onto the skin daily. 30 patch 5    fluorouracil 5 % External Cream Apply to the affected areas twice daily for four weeks; during use the areas will feel irritated and look red and swollen      Nutritional Supplements (JUICE PLUS FIBRE OR) Take 1 tablet by mouth daily.      triamcinolone acetonide 0.1 % External Ointment Apply 1 Application topically daily as needed.        acetaminophen 500 MG Oral Tab Take 1 tablet (500 mg total) by mouth every 6 (six) hours as needed for Pain.       Allergies:   Allergies   Allergen Reactions    Emend [Aprepitant] HIVES     One single hive after completion of emend.     Other OTHER (SEE COMMENTS)     watery and itchy eyes- CATS    Penicillins RASH    Ragweed ITCHING and OTHER (SEE COMMENTS)     stuffiness       Psychosocial History:  Social History     Social History Narrative    .  6 adult children.  Used to be an ,      Social History     Socioeconomic History    Marital status:    Tobacco Use    Smoking status: Former     Current packs/day: 0.00     Average packs/day: 0.5 packs/day for 14.9 years (7.4 ttl pk-yrs)     Types: Cigarettes     Start date: 1953     Quit date: 1967     Years since quittin.4    Smokeless tobacco: Never    Tobacco comments:     quit over 50 years ago   Vaping Use    Vaping status: Never Used   Substance and Sexual Activity    Alcohol use: Not Currently    Drug use: Never   Other Topics Concern    Caffeine Concern Yes     Comment: coffee half a cup and occ coke    Exercise Yes     Comment: PT   Social History Narrative    .  6 adult children.  Used to be an ,      Social Determinants of Health      [facility]    Social Connections     Family  Medical History:  Family History   Problem Relation Age of Onset    Cancer Mother 53        Brain    Colon Cancer Father 73    Cancer Brother 55        Mesothelioma     Review of Systems:  A 10-point ROS was done with pertinent positives and negative per the HPI    Vital Signs:  Height: 157.5 cm (5' 2.01\") (05/15 1010)  Weight: 57.5 kg (126 lb 12.8 oz) (05/15 1010)  BSA (Calculated - sq m): 1.58 sq meters (05/15 1010)  Pulse: --  BP: 161/88 (05/15 1010)  Temp: 98.6 °F (37 °C) (05/15 1010)  Do Not Use - Resp Rate: --  SpO2: 97 % (05/15 1010)    Wt Readings from Last 6 Encounters:   05/15/24 57.5 kg (126 lb 12.8 oz)   03/15/24 59 kg (130 lb)   02/14/24 58.2 kg (128 lb 6.4 oz)   01/20/24 56.9 kg (125 lb 6 oz)   11/16/23 58.2 kg (128 lb 6.4 oz)   10/20/23 59.4 kg (131 lb)     ECOG PS: 3    Physical Examination:  General: Patient is alert and oriented, not in acute distress.  +Impaired short-term memory  Psych: Mood and affect are appropriate  Eyes: EOMI  ENT: Oropharynx is clear  CV: Regular rate and rhythm, no murmurs  Respiratory: Lungs clear to auscultation bilaterally  GI/Abd: Soft, non-tender with normoactive bowel sounds, no hepatosplenomegaly  Neurological: Grossly intact.  Unsteady gait.  Lymphatics: No palpable lymphadenopathy.  Skin: no rashes or petechiae  Ext: no LE edema today  Lines: +portacath- no erythema, induration, or drainage    Laboratory:  Lab Results   Component Value Date    WBC 4.4 05/15/2024    WBC 4.5 02/14/2024    WBC 3.8 (L) 11/16/2023    HGB 15.2 05/15/2024    HGB 14.6 02/14/2024    HGB 14.3 11/16/2023    HCT 42.0 05/15/2024    MCV 93.5 05/15/2024    MCH 33.9 05/15/2024    MCHC 36.2 05/15/2024    RDW 12.8 05/15/2024    .0 (L) 05/15/2024    .0 02/14/2024    .0 11/16/2023     Lab Results   Component Value Date    GLU 93 05/15/2024    BUN 15 05/15/2024    BUNCREA 19.8 08/02/2021    CREATSERUM 0.85 05/15/2024    CREATSERUM 0.82 02/14/2024    CREATSERUM 0.95 01/20/2024     ANIONGAP 5 05/15/2024    GFRNAA 83 07/27/2022    GFRAA 96 07/27/2022    CA 8.7 05/15/2024    OSMOCALC 293 05/15/2024    ALKPHO 91 05/15/2024    AST 18 05/15/2024    ALT 18 05/15/2024    BILT 1.0 05/15/2024    TP 6.5 05/15/2024    ALB 3.7 05/15/2024    GLOBULIN 2.8 05/15/2024     05/15/2024    K 3.8 05/15/2024     05/15/2024    CO2 24.0 05/15/2024     Lab Results   Component Value Date    INR 1.0 10/04/2021     Lab Results   Component Value Date     05/15/2024     02/14/2024     11/16/2023     08/17/2023     04/15/2023     01/13/2023     10/26/2022     07/27/2022     04/28/2022     03/10/2022     02/17/2022     (H) 01/27/2022     01/06/2022     (H) 12/30/2021     (H) 12/16/2021     (H) 12/09/2021     11/18/2021     10/28/2021     10/13/2021     10/06/2021     09/22/2021     08/02/2021     12/23/2020     Lab Results   Component Value Date    ESRML 8 12/23/2020     Lab Results   Component Value Date    CRP <0.29 12/23/2020     Lab Results   Component Value Date    PSA <0.01 01/13/2023    PSA <0.01 08/02/2021    PSA 0.01 11/24/2020     Component      Latest Ref Rng & Units 9/22/2021   Hbsag Screen Index       <0.10   HBSAg Screen      Nonreactive  Nonreactive   HEPATITIS B SURFACE AB QUAL      Nonreactive  Nonreactive   HEPATITIS B SURFACE AB QUANT      mIU/mL <3.10   HEPATITIS B CORE AB, TOTAL      Nonreactive  Nonreactive   HCV AB      Nonreactive  Nonreactive   HIV Antigen Antibody Combo      Non-Reactive Non-Reactive     Imaging:    TTE 9/25/21: Conclusions:   1. Left ventricle: The cavity size was normal. Wall thickness was normal. The estimated ejection fraction was 60-65%. No regional wall motion abnormalities. Doppler parameters are consistent with abnormal left ventricular relaxation - grade 1 diastolic dysfunction.   2. Aortic valve:  Trivial regurgitation.   3. Mitral valve: Mildly calcified annulus. Mitral valve demonstrates mildly thickened leaflets. There was mild regurgitation.   4. Left atrium: The left atrium was mildly dilated.   5. 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  Completed BV-CHP (dose reduced CHP as per \"mini-CHOP\" protocol) x 6 cycles 3/2022.  His end of treatment PET scan showed Deauville 1, complete response.    -He is now 2 years out from end of treatment - no evidence of recurrent disease at this time.    -Continue surveillance monitoring as below:   -Clinic visit with CBC, CMP, LDH q6mths until 5 yrs out   -No further routine imaging surveillance is needed  -Order placed for IR Port-A-Cath removal    *Lymphopenia, immunodeficiency  -Related to lymphoma and prior chemotherapy.    -Continue to stay up-to-date with recommended vaccinations  -Repeat CBC with differential in 6 months     *LLE popliteal DVT and GSV thrombosis, dx'd 1/27/22  -completed 3 months of anticoagulation.  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 Remains off anticoagulation, no s/s of recurrence/progression.    *Gait instability  -Previously worked with physical therapy and was doing somewhat better.  He was previously recommended to use a cane which I reiterated today.      *Urinary incontinence  -Chronic since prior prostatectomy.  Following with urology, previously had pelvic floor therapy.  I encouraged him to follow-up with urology again if this continues to bother him    *Dementia  -Currently on rivastigmine per neurology.  -Has had some issues with paranoia and hallucinations, advised them to discuss this further with his neurologist    RTC in 6 months    Misha Goodwin MD  Hematology/Medical Oncology  [facility]    Risk level: High- T-cell lymphoma with multiple other comorbidities as detailed above requiring close  monitoring

## 2024-05-16 PROBLEM — D84.81 IMMUNODEFICIENCY DUE TO CONDITIONS CLASSIFIED ELSEWHERE (HCC): Status: ACTIVE | Noted: 2024-05-16

## 2024-05-16 PROBLEM — D84.9 IMMUNOCOMPROMISED (HCC): Status: RESOLVED | Noted: 2022-02-17 | Resolved: 2024-05-16

## 2024-05-20 NOTE — PAT NURSING NOTE
PreOp Instructions: bring in list of meddications     You are scheduled for: an Interventional Radiology Procedure     Date of Procedure: 05/30/24. Check in at 7:30 am     Diet Instructions: Do not eat or drink anything after midnight     Medications: Medications you are allowed to take can be taken with a sip of water the morning of your procedure     Sleep Apnea: If you have sleep apnea, please bring your mask and tubing     Skin Prep: Shower with antibacterial soap using a clean washcloth, prior to procedure     Driving After Procedure: If sedation is given, you WILL NOT be able to drive home. You will need a responsible adult  to drive you home.     Discharge Teaching: Your nurse will give you specific instructions before discharge, Most people can resume normal activities in 2-3 days, Any questions, please call the physician's office

## 2024-05-30 ENCOUNTER — HOSPITAL ENCOUNTER (OUTPATIENT)
Dept: INTERVENTIONAL RADIOLOGY/VASCULAR | Facility: HOSPITAL | Age: 84
Discharge: HOME | End: 2024-05-30
Attending: INTERNAL MEDICINE | Admitting: INTERNAL MEDICINE
Payer: MEDICARE

## 2024-05-30 VITALS
TEMPERATURE: 98 F | RESPIRATION RATE: 15 BRPM | HEART RATE: 75 BPM | DIASTOLIC BLOOD PRESSURE: 76 MMHG | SYSTOLIC BLOOD PRESSURE: 138 MMHG | OXYGEN SATURATION: 97 %

## 2024-05-30 DIAGNOSIS — Z95.828 PORT-A-CATH IN PLACE: ICD-10-CM

## 2024-05-30 LAB — INR: 1.1 (ref 0.8–1.3)

## 2024-05-30 PROCEDURE — 0JPT0WZ REMOVAL OF TOTALLY IMPLANTABLE VASCULAR ACCESS DEVICE FROM TRUNK SUBCUTANEOUS TISSUE AND FASCIA, OPEN APPROACH: ICD-10-PCS | Performed by: STUDENT IN AN ORGANIZED HEALTH CARE EDUCATION/TRAINING PROGRAM

## 2024-05-30 PROCEDURE — 85610 PROTHROMBIN TIME: CPT | Performed by: STUDENT IN AN ORGANIZED HEALTH CARE EDUCATION/TRAINING PROGRAM

## 2024-05-30 PROCEDURE — 99152 MOD SED SAME PHYS/QHP 5/>YRS: CPT | Performed by: STUDENT IN AN ORGANIZED HEALTH CARE EDUCATION/TRAINING PROGRAM

## 2024-05-30 PROCEDURE — 77001 FLUOROGUIDE FOR VEIN DEVICE: CPT | Performed by: STUDENT IN AN ORGANIZED HEALTH CARE EDUCATION/TRAINING PROGRAM

## 2024-05-30 PROCEDURE — 36590 REMOVAL TUNNELED CV CATH: CPT | Performed by: STUDENT IN AN ORGANIZED HEALTH CARE EDUCATION/TRAINING PROGRAM

## 2024-05-30 RX ORDER — SODIUM CHLORIDE 9 MG/ML
INJECTION, SOLUTION INTRAVENOUS CONTINUOUS
Status: DISCONTINUED | OUTPATIENT
Start: 2024-05-30 | End: 2024-05-30

## 2024-05-30 RX ORDER — MIDAZOLAM HYDROCHLORIDE 1 MG/ML
INJECTION INTRAMUSCULAR; INTRAVENOUS
Status: COMPLETED
Start: 2024-05-30 | End: 2024-05-30

## 2024-05-30 RX ORDER — LIDOCAINE HYDROCHLORIDE AND EPINEPHRINE BITARTRATE 20; .01 MG/ML; MG/ML
INJECTION, SOLUTION SUBCUTANEOUS
Status: COMPLETED
Start: 2024-05-30 | End: 2024-05-30

## 2024-05-30 NOTE — DISCHARGE INSTRUCTIONS
Leave mepilex dressing in place for 5 days    On day 5 wash hands with soap and water for 20 seconds prior to removing dressing    May shower on day 5 leaving steristrips in place...they will fall off on there own.    You may have slight bruising : take tylenol for discomfort     If you have any question, problems, or concerns, please contact your Dr. Office

## 2024-05-30 NOTE — PROCEDURES
[facility]   part of [facility]  Procedure Note    Lenin Nagy Patient Status:  Outpatient    1940 MRN [id number]   Location [facility] INTERVENTIONAL SUITES Attending Misha Goodwin MD   Hosp Day # 0 PCP Salas Cagle DO     Procedure: Port removal    Pre-Procedure Diagnosis:  No longer needed    Post-Procedure Diagnosis: Same    Anesthesia:  Local and Sedation    Findings:  Successful removal of right chest port.    Specimens: None    Blood Loss:  <5ml    Tourniquet Time: 0  Complications:  None  Drains:  None    Secondary Diagnosis:  None    Cydney Pacheco MD  2024

## 2024-05-30 NOTE — INTERVAL H&P NOTE
The above referenced H&P was reviewed by Cydney Pacheco MD on 5/30/2024, the patient was examined and no significant changes have occurred in the patient's condition since the H&P was performed.  Risks, benefits, alternative treatments and consequences of no treatment were discussed.  We will proceed with procedure as planned.      Cydney Pacheco MD  5/30/2024  9:20 AM

## 2024-05-30 NOTE — PROGRESS NOTES
Patient received from IR lab.  Right chest site soft, no hematoma, dressing C/D/I . Will continue to monitor. Pt awake,alert. Po fluids given.    1100  pt tolerated po well discharge instructions reviewed w daughter. Pt to bathroom voided. Dressing remains c/d/I. Pt discharged to home via wheelchair in stable condition

## 2024-06-12 DIAGNOSIS — F03.A0 MILD DEMENTIA WITHOUT BEHAVIORAL DISTURBANCE, PSYCHOTIC DISTURBANCE, MOOD DISTURBANCE, OR ANXIETY, UNSPECIFIED DEMENTIA TYPE (HCC): Primary | ICD-10-CM

## 2024-06-13 RX ORDER — RIVASTIGMINE 4.6 MG/24H
1 PATCH, EXTENDED RELEASE TRANSDERMAL DAILY
Qty: 30 PATCH | Refills: 2 | Status: SHIPPED | OUTPATIENT
Start: 2024-06-13

## 2024-06-13 NOTE — TELEPHONE ENCOUNTER
Medication: RIVASTIGMINE 4.6 MG/24HR Transdermal Patch 24 Hr      Date of last refill: 10/20/2023 (#30 patch/5)  [place]     Last office visit: 03/15/2024  Due back to clinic per last office note:  Around 09/15/2024  Date next office visit scheduled:    Future Appointments   Date Time Provider Department Center   6/22/2024 10:45 AM Salas Cagle DO [facility]   11/13/2024 10:30 AM Misha Goodwin MD  [facility]           Last OV note recommendation:    ASSESSMENT/PLAN:          ICD-10-CM     1. Mild dementia without behavioral disturbance, psychotic disturbance, mood disturbance, or anxiety, unspecified dementia type (HCC)  F03.A0         2. Dizziness  R42         3. Hypotension, unspecified hypotension type  I95.9               Dizziness likely due to hypotension. Orthostatic positive in the office  Reviewed PT note and agree there is no evidence of benign positional vertigo  On Amlodipine 5 mg daily, advised to hold the medication and discussed with Dr Cagle  Adequate water and electrolyte intake.  Monitor blood pressure closely at home     2. 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  Was switched to Exelon patch   Counseled on cognitive activities     Follow up in 3-6 months     See orders and medications filed with this encounter. The patient indicates understanding of these issues and agrees with the plan.

## 2024-06-21 ENCOUNTER — TELEPHONE (OUTPATIENT)
Dept: FAMILY MEDICINE CLINIC | Facility: CLINIC | Age: 84
End: 2024-06-21

## 2024-07-06 ENCOUNTER — OFFICE VISIT (OUTPATIENT)
Dept: FAMILY MEDICINE CLINIC | Facility: CLINIC | Age: 84
End: 2024-07-06
Payer: MEDICARE

## 2024-07-06 VITALS
BODY MASS INDEX: 23.42 KG/M2 | DIASTOLIC BLOOD PRESSURE: 70 MMHG | OXYGEN SATURATION: 99 % | RESPIRATION RATE: 16 BRPM | WEIGHT: 127.25 LBS | HEIGHT: 62 IN | TEMPERATURE: 98 F | HEART RATE: 62 BPM | SYSTOLIC BLOOD PRESSURE: 107 MMHG

## 2024-07-06 DIAGNOSIS — E03.4 HYPOTHYROIDISM DUE TO ACQUIRED ATROPHY OF THYROID: ICD-10-CM

## 2024-07-06 DIAGNOSIS — D84.9 IMMUNOCOMPROMISED (HCC): ICD-10-CM

## 2024-07-06 DIAGNOSIS — F02.80 ALZHEIMER'S DISEASE, UNSPECIFIED (HCC): ICD-10-CM

## 2024-07-06 DIAGNOSIS — J41.8 MIXED SIMPLE AND MUCOPURULENT CHRONIC BRONCHITIS (HCC): ICD-10-CM

## 2024-07-06 DIAGNOSIS — F33.41 RECURRENT MAJOR DEPRESSIVE DISORDER, IN PARTIAL REMISSION (HCC): Chronic | ICD-10-CM

## 2024-07-06 DIAGNOSIS — D69.6 THROMBOCYTOPENIA (HCC): ICD-10-CM

## 2024-07-06 DIAGNOSIS — I10 PRIMARY HYPERTENSION: ICD-10-CM

## 2024-07-06 DIAGNOSIS — C84.48 PERIPHERAL T-CELL LYMPHOMA OF LYMPH NODES OF MULTIPLE REGIONS (HCC): ICD-10-CM

## 2024-07-06 DIAGNOSIS — Z00.00 ENCOUNTER FOR ANNUAL HEALTH EXAMINATION: Primary | ICD-10-CM

## 2024-07-06 DIAGNOSIS — C61 PROSTATE CANCER (HCC): ICD-10-CM

## 2024-07-06 DIAGNOSIS — G30.9 ALZHEIMER'S DISEASE, UNSPECIFIED (HCC): ICD-10-CM

## 2024-07-06 DIAGNOSIS — E44.1 MILD PROTEIN-CALORIE MALNUTRITION (HCC): ICD-10-CM

## 2024-07-06 DIAGNOSIS — Z90.79 S/P PROSTATECTOMY: ICD-10-CM

## 2024-07-06 PROCEDURE — 3074F SYST BP LT 130 MM HG: CPT | Performed by: FAMILY MEDICINE

## 2024-07-06 PROCEDURE — 96160 PT-FOCUSED HLTH RISK ASSMT: CPT | Performed by: FAMILY MEDICINE

## 2024-07-06 PROCEDURE — 3078F DIAST BP <80 MM HG: CPT | Performed by: FAMILY MEDICINE

## 2024-07-06 PROCEDURE — 99499 UNLISTED E&M SERVICE: CPT | Performed by: FAMILY MEDICINE

## 2024-07-06 PROCEDURE — 3008F BODY MASS INDEX DOCD: CPT | Performed by: FAMILY MEDICINE

## 2024-07-06 PROCEDURE — G0439 PPPS, SUBSEQ VISIT: HCPCS | Performed by: FAMILY MEDICINE

## 2024-07-06 RX ORDER — LEVOTHYROXINE SODIUM 0.03 MG/1
25 TABLET ORAL
Qty: 90 TABLET | Refills: 1 | Status: SHIPPED | OUTPATIENT
Start: 2024-07-06

## 2024-07-06 RX ORDER — ESCITALOPRAM OXALATE 5 MG/1
5 TABLET ORAL DAILY
Qty: 90 TABLET | Refills: 1 | Status: SHIPPED | OUTPATIENT
Start: 2024-07-06

## 2024-07-06 NOTE — PROGRESS NOTES
Subjective:   Lenin Nagy is a 83 year old male who presents for a   MA AHA (Medicare Advantage Annual Health Assessment) and scheduled follow up of multiple significant but stable problems.       History/Other:   Fall Risk Assessment:   He has been screened for Falls and is low risk.      Cognitive Assessment:   Abnormal  What day of the week is this?: Correct  What month is it?: Correct  What year is it?: Correct  Recall \"Ball\": Correct  Recall \"Flag\": Incorrect  Recall \"Tree\": Incorrect    Functional Ability/Status:   Lenin Nagy has some abnormal functions as listed below:  He has Vision problems based on screening of functional status. He has problems with Memory based on screening of functional status.       Depression Screening (PHQ):  PHQ-2 SCORE: 0  , done 7/6/2024             Advanced Directives:   He does have a Living Will but we do NOT have it on file in Epic.    He does have a POA but we do NOT have it on file in Epic.    Discussed Advance Care Planning with patient (and family/surrogate if present). Standard forms made available to patient in After Visit Summary.      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)    Examination prior to chemotherapy    Thrombocytopenia (HCC)    Poor sleep    Elevated alkaline phosphatase level    Fatigue due to treatment    Atypical pneumonia    Hypokalemia    Left leg pain    Left leg swelling    Poor fluid intake    Poor appetite    Acute deep vein thrombosis  (DVT) of popliteal vein of left lower extremity (HCC)    Lymphopenia    Mild protein-calorie malnutrition (HCC)    Unstable gait    Urinary incontinence    History of DVT (deep vein thrombosis)    Dysphagia    Alternating constipation and diarrhea    Alzheimer's disease, unspecified (HCC)    Asthma with COPD (chronic obstructive pulmonary disease) ([facility])    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    Current Medications:  Outpatient Medications Marked as Taking for the 7/6/24 encounter (Office Visit) with Salas Cagle, DO   Medication Sig    levothyroxine 25 MCG Oral Tab Take 1 tablet (25 mcg total) by mouth before breakfast.    escitalopram 5 MG Oral Tab Take 1 tablet (5 mg total) by mouth daily.    RIVASTIGMINE 4.6 MG/24HR Transdermal Patch 24 Hr Place 1 patch onto the skin daily.    amLODIPine 5 MG Oral Tab Take 1 tablet (5 mg total) by mouth daily.    fluorouracil 5 % External Cream Apply to the affected areas twice daily for four weeks; during use the areas will feel irritated and look red and swollen    Nutritional Supplements (JUICE PLUS FIBRE OR) Take 1 tablet by mouth daily.    triamcinolone acetonide 0.1 % External Ointment Apply 1 Application topically daily as needed.         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  Surgical History:  He  has a past surgical history that includes tonsillectomy; repair ing hernia,5+y/o,reducibl; removal gallbladder; ophth cataracts (dmg); colonoscopy (10/27/2020); hernia surgery; other surgical history (2020); anesth,removal of prostate; cholecystectomy (?); laparoscopy, surgical prostatectomy, retropubic radical, w/nerve sparing (); cataract; and other (2021).   Family History:  His family history includes Cancer (age of onset: 53) in his mother; Cancer (age of onset: 55) in his brother; Colon Cancer (age of onset: 73) in his father.  Social History:  He  reports that he quit smoking about 56 years ago. His smoking use included cigarettes. He started smoking about 71 years ago. He has a 7.4 pack-year smoking history. He has never used smokeless tobacco. He reports that he does not currently use alcohol. He reports that he does not use drugs.    Tobacco:  He smoked tobacco in the past but quit greater than 12 months ago.  Social History     Tobacco Use   Smoking Status Former    Current packs/day: 0.00    Average packs/day: 0.5 packs/day for 14.9 years (7.4 ttl pk-yrs)    Types: Cigarettes    Start date: 1953    Quit date: 1967    Years since quittin.6   Smokeless Tobacco Never   Tobacco Comments    quit over 50 years ago        CAGE Alcohol Screen:   CAGE screening score of 0 on 2024, showing low risk of alcohol abuse.      Patient Care Team:  Salas Cagle DO as PCP - General (Family Medicine)  Mukund Patterson (Family Practice)  Elkin Weaver MD (SURGERY, GENERAL)  Fritz Brewer MD (GASTROENTEROLOGY)  Misha Goodwin MD (ONCOLOGY)  Maico Cortez MD as Neurologist (NEUROLOGY)    Review of Systems  GENERAL: feels well otherwise  SKIN: denies any unusual skin lesions  EYES: denies blurred vision or double vision  HEENT: denies nasal congestion, sinus pain or ST  LUNGS:  denies shortness of breath with exertion  CARDIOVASCULAR: denies chest pain on exertion  GI: denies abdominal pain, denies heartburn  : 1 per night nocturia, no complaint of urinary incontinence  MUSCULOSKELETAL: denies back pain  NEURO: denies headaches  PSYCHE: denies depression or anxiety  HEMATOLOGIC: denies hx of anemia  ENDOCRINE: denies thyroid history  ALL/ASTHMA: denies hx of allergy or asthma    Objective:   Physical Exam  General Appearance:  Alert, cooperative, no distress, appears stated age   Head:  Normocephalic, without obvious abnormality, atraumatic   Eyes:  PERRL, conjunctiva/corneas clear, EOM's intact, both eyes   Ears:  Normal TM's and external ear canals, both ears   Nose: Nares normal, septum midline, mucosa normal, no drainage or sinus tenderness   Throat: Lips, mucosa, and tongue normal; teeth and gums normal   Neck: Supple, symmetrical, trachea midline, no adenopathy, thyroid: not enlarged, symmetric, no tenderness/mass/nodules, no carotid bruit or JVD   Back:   Symmetric, no curvature, ROM normal, no CVA tenderness   Lungs:   Distant breath sounds, clear   Chest Wall:  No tenderness or deformity   Heart:  Regular rate and rhythm, S1, S2 normal, no murmur, rub or gallop   Abdomen:   Soft, non-tender, bowel sounds active all four quadrants,  no masses, no organomegaly           Extremities: Extremities normal, atraumatic, no cyanosis or edema   Pulses: 2+ and symmetric   Skin: Skin color, texture, turgor normal, no rashes or lesions   Lymph nodes: Cervical, supraclavicular, and axillary nodes normal   Neurologic: Normal     /70   Pulse 62   Temp 97.8 °F (36.6 °C) (Temporal)   Resp 16   Ht 5' 2\" (1.575 m)   Wt 127 lb 4 oz (57.7 kg)   SpO2 99%   BMI 23.27 kg/m²  Estimated body mass index is 23.27 kg/m² as calculated from the following:    Height as of this encounter: 5' 2\" (1.575 m).    Weight as of this encounter: 127 lb 4 oz (57.7 kg).    Medicare Hearing Assessment:    Hearing Screening    Time taken: 7/6/2024  9:48 AM  Screening Method: Finger Rub  Finger Rub Result: Pass         Visual Acuity:   Right Eye Visual Acuity: Uncorrected Right Eye Chart Acuity: 20/30   Left Eye Visual Acuity: Corrected Left Eye Chart Acuity: 20/30   Both Eyes Visual Acuity: Corrected Both Eyes Chart Acuity: 20/30   Able To Tolerate Visual Acuity: Yes        Assessment & Plan:   Lenin Nagy is a 83 year old male who presents for a Medicare Assessment.     1. Recurrent major depressive disorder, in partial remission (HCC) (Primary)  2. Hypothyroidism due to acquired atrophy of thyroid  -     Levothyroxine Sodium; Take 1 tablet (25 mcg total) by mouth before breakfast.  Dispense: 90 tablet; Refill: 1  3. Alzheimer's disease, unspecified (HCC)  -     Escitalopram Oxalate; Take 1 tablet (5 mg total) by mouth daily.  Dispense: 90 tablet; Refill: 1  4. Thrombocytopenia (HCC)  5. 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 Prostate cancer (HCC)  7. Primary hypertension  8. S/P prostatectomy  9. Mild protein-calorie malnutrition (HCC)  10. Immunocompromised (HCC)  11. Mixed simple and mucopurulent chronic bronchitis (HCC)  12. Encounter for annual health examination  1. Hypothyroidism due to acquired atrophy of thyroid  Continue same dose.  Recheck in January.  - levothyroxine 25 MCG Oral Tab; Take 1 tablet (25 mcg total) by mouth before breakfast.  Dispense: 90 tablet; Refill: 1    2. Alzheimer's disease, unspecified (HCC)  Stable.  Continue current care recommend socialization, exercise.  - escitalopram 5 MG Oral Tab; Take 1 tablet (5 mg total) by mouth daily.  Dispense: 90 tablet; Refill: 1    3. Recurrent major depressive disorder, in partial remission (HCC)  Stable.  Socialization, exercise.    4. Thrombocytopenia (HCC)  Stable.  Continue to follow with hematology oncology.    5. 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   Oncology.    6. Prostate cancer (HCC)  Stable.  Urology.    7. Primary hypertension  Continue to monitor blood pressure.  Stable.    8. S/P prostatectomy  Urology.    9. Mild protein-calorie malnutrition (HCC)  Increase protein in diet.  Regular exercise.  Stable.    10. Immunocompromised (HCC)  Stable.    11. Mixed simple and mucopurulent chronic bronchitis (HCC)  Stable.  Exercise, fresh air.    12. Encounter for annual health examination  Stable.    The patient indicates understanding of these issues and agrees to the plan.  Continue with current treatment plan.  Patient reassured.  Reinforced healthy diet, lifestyle, and exercise.  Discussed on initiating exercise program.  Balance exercises.  Call with questions or problems.    No follow-ups on file.     Salas Cagle DO, 7/6/2024     Supplementary Documentation:   General Health:  In the past six months, have you lost more than 10 pounds without trying?: 2 - No  Has your appetite been poor?: No  Type of Diet: Balanced;Vegetarian;Diabetic;Low Salt;Low Carb;Other  How does the patient maintain a good energy level?: Appropriate Exercise  How would you describe your daily physical activity?: Light  How would you describe your current health state?: Good  How do you maintain positive mental well-being?: Social Interaction;Visiting Family;Puzzles;Games;Visiting Friends  On a scale of 0 to 10, with 0 being no pain and 10 being severe pain, what is your pain level?: 0 - (None)  In the past six months, have you experienced urine leakage?: 0-No  At any time do you feel concerned for the safety/well-being of yourself and/or your children, in your home or elsewhere?: No  Have you had any immunizations at another office such as Influenza, Hepatitis B, Tetanus, or Pneumococcal?: No    Health Maintenance   Topic Date Due    Zoster Vaccines (1 of 2) Never done    COVID-19 Vaccine (3 - 2023-24 season) 09/01/2023    Colorectal Cancer Screening  10/27/2023    MA Annual Health  Assessment  01/01/2024    Influenza Vaccine (1) 10/01/2024    Annual Depression Screening  Completed    Fall Risk Screening (Annual)  Completed    Pneumococcal Vaccine: 65+ Years  Completed

## 2024-07-14 DIAGNOSIS — I10 ESSENTIAL HYPERTENSION: ICD-10-CM

## 2024-07-15 RX ORDER — AMLODIPINE BESYLATE 5 MG/1
5 TABLET ORAL DAILY
Qty: 90 TABLET | Refills: 0 | Status: SHIPPED | OUTPATIENT
Start: 2024-07-15

## 2024-07-15 NOTE — TELEPHONE ENCOUNTER
Last refill: 01/20/24  qtY: 90  W/1 refills  Last ov: 07/06/24    Requested Prescriptions     Pending Prescriptions Disp Refills    AMLODIPINE 5 MG Oral Tab [Pharmacy Med Name: Amlodipine Besylate 5 Mg Tab Unic] 90 tablet 0     Sig: Take 1 tablet (5 mg total) by mouth daily.     Future Appointments   Date Time Provider Department Center   11/13/2024 10:30 AM Misha Goodwin MD  [facility]

## 2024-11-09 DIAGNOSIS — F03.A0 MILD DEMENTIA WITHOUT BEHAVIORAL DISTURBANCE, PSYCHOTIC DISTURBANCE, MOOD DISTURBANCE, OR ANXIETY, UNSPECIFIED DEMENTIA TYPE (HCC): ICD-10-CM

## 2024-11-11 RX ORDER — RIVASTIGMINE 4.6 MG/24H
1 PATCH, EXTENDED RELEASE TRANSDERMAL DAILY
Qty: 30 PATCH | Refills: 0 | Status: SHIPPED | OUTPATIENT
Start: 2024-11-11

## 2024-11-11 NOTE — TELEPHONE ENCOUNTER
Medication: RIVASTIGMINE 4.6 MG/24HR Transdermal Patch 24 Hr      Date of last refill: 06/13/2024 (#30 patch/2)  [place]     Last office visit: 03/15/2024  Due back to clinic per last office note:  Around 09/15/2024  Date next office visit scheduled:    Future Appointments   Date Time Provider Department Center   11/13/2024 10:30 AM Misha Goodwin MD  [facility]   11/27/2024 10:20 AM Maico Cortez MD [facility]           Last OV note recommendation:    ASSESSMENT/PLAN:          ICD-10-CM     1. Mild dementia without behavioral disturbance, psychotic disturbance, mood disturbance, or anxiety, unspecified dementia type (HCC)  F03.A0         2. Dizziness  R42         3. Hypotension, unspecified hypotension type  I95.9               Dizziness likely due to hypotension. Orthostatic positive in the office  Reviewed PT note and agree there is no evidence of benign positional vertigo  On Amlodipine 5 mg daily, advised to hold the medication and discussed with Dr Cagle  Adequate water and electrolyte intake.  Monitor blood pressure closely at home     2. 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  Was switched to Exelon patch   Counseled on cognitive activities     Follow up in 3-6 months     See orders and medications filed with this encounter. The patient indicates understanding of these issues and agrees with the plan.              Maico Cortez MD  [facility] [place]

## 2024-11-13 ENCOUNTER — OFFICE VISIT (OUTPATIENT)
Dept: HEMATOLOGY/ONCOLOGY | Facility: HOSPITAL | Age: 84
End: 2024-11-13
Attending: INTERNAL MEDICINE
Payer: MEDICARE

## 2024-11-13 VITALS
DIASTOLIC BLOOD PRESSURE: 75 MMHG | RESPIRATION RATE: 18 BRPM | HEART RATE: 123 BPM | TEMPERATURE: 99 F | WEIGHT: 122 LBS | BODY MASS INDEX: 22.17 KG/M2 | HEIGHT: 62.01 IN | SYSTOLIC BLOOD PRESSURE: 139 MMHG | OXYGEN SATURATION: 93 %

## 2024-11-13 DIAGNOSIS — D84.81 IMMUNODEFICIENCY DUE TO CONDITIONS CLASSIFIED ELSEWHERE (HCC): ICD-10-CM

## 2024-11-13 DIAGNOSIS — R26.81 UNSTABLE GAIT: ICD-10-CM

## 2024-11-13 DIAGNOSIS — C84.48 PERIPHERAL T-CELL LYMPHOMA OF LYMPH NODES OF MULTIPLE REGIONS (HCC): Primary | ICD-10-CM

## 2024-11-13 DIAGNOSIS — Z86.718 HISTORY OF DVT (DEEP VEIN THROMBOSIS): ICD-10-CM

## 2024-11-13 DIAGNOSIS — K59.09 OTHER CONSTIPATION: ICD-10-CM

## 2024-11-13 DIAGNOSIS — D72.810 LYMPHOPENIA: ICD-10-CM

## 2024-11-13 LAB
ALBUMIN SERPL-MCNC: 4 G/DL (ref 3.2–4.8)
ALBUMIN/GLOB SERPL: 1.8 {RATIO} (ref 1–2)
ALP LIVER SERPL-CCNC: 85 U/L
ALT SERPL-CCNC: 13 U/L
ANION GAP SERPL CALC-SCNC: <0 MMOL/L (ref 0–18)
AST SERPL-CCNC: 22 U/L (ref ?–34)
BASOPHILS # BLD AUTO: 0.06 X10(3) UL (ref 0–0.2)
BASOPHILS NFR BLD AUTO: 1.3 %
BILIRUB SERPL-MCNC: 1.2 MG/DL (ref 0.2–1.1)
BUN BLD-MCNC: 14 MG/DL (ref 9–23)
CALCIUM BLD-MCNC: 9.1 MG/DL (ref 8.7–10.4)
CHLORIDE SERPL-SCNC: 110 MMOL/L (ref 98–112)
CO2 SERPL-SCNC: 32 MMOL/L (ref 21–32)
CREAT BLD-MCNC: 0.89 MG/DL
EGFRCR SERPLBLD CKD-EPI 2021: 86 ML/MIN/1.73M2 (ref 60–?)
EOSINOPHIL # BLD AUTO: 0.1 X10(3) UL (ref 0–0.7)
EOSINOPHIL NFR BLD AUTO: 2.2 %
ERYTHROCYTE [DISTWIDTH] IN BLOOD BY AUTOMATED COUNT: 13.2 %
GLOBULIN PLAS-MCNC: 2.2 G/DL (ref 2–3.5)
GLUCOSE BLD-MCNC: 91 MG/DL (ref 70–99)
HCT VFR BLD AUTO: 41.9 %
HGB BLD-MCNC: 15.1 G/DL
IMM GRANULOCYTES # BLD AUTO: 0.01 X10(3) UL (ref 0–1)
IMM GRANULOCYTES NFR BLD: 0.2 %
LDH SERPL L TO P-CCNC: 207 U/L
LYMPHOCYTES # BLD AUTO: 0.67 X10(3) UL (ref 1–4)
LYMPHOCYTES NFR BLD AUTO: 14.9 %
MCH RBC QN AUTO: 33.7 PG (ref 26–34)
MCHC RBC AUTO-ENTMCNC: 36 G/DL (ref 31–37)
MCV RBC AUTO: 93.5 FL
MONOCYTES # BLD AUTO: 0.43 X10(3) UL (ref 0.1–1)
MONOCYTES NFR BLD AUTO: 9.5 %
NEUTROPHILS # BLD AUTO: 3.24 X10 (3) UL (ref 1.5–7.7)
NEUTROPHILS # BLD AUTO: 3.24 X10(3) UL (ref 1.5–7.7)
NEUTROPHILS NFR BLD AUTO: 71.9 %
OSMOLALITY SERPL CALC.SUM OF ELEC: 292 MOSM/KG (ref 275–295)
PLATELET # BLD AUTO: 144 10(3)UL (ref 150–450)
POTASSIUM SERPL-SCNC: 4.1 MMOL/L (ref 3.5–5.1)
PROT SERPL-MCNC: 6.2 G/DL (ref 5.7–8.2)
RBC # BLD AUTO: 4.48 X10(6)UL
SODIUM SERPL-SCNC: 141 MMOL/L (ref 136–145)
WBC # BLD AUTO: 4.5 X10(3) UL (ref 4–11)

## 2024-11-13 PROCEDURE — G2211 COMPLEX E/M VISIT ADD ON: HCPCS | Performed by: INTERNAL MEDICINE

## 2024-11-13 PROCEDURE — 99214 OFFICE O/P EST MOD 30 MIN: CPT | Performed by: INTERNAL MEDICINE

## 2024-11-13 NOTE — PROGRESS NOTES
Outpatient Oncology Care Plan  Problem list:  Knowledge deficit  Fatigue     Problems related to:    chemotherapy  disease/disease progression  side effect of treatment     Interventions:  provided general teaching     Expected outcomes:  understands plan of care     Progress towards outcome:  making progress     Education Record     Learner:  Patient  Barriers / Limitations:  None  Method:  Brief focused  Outcome:  Shows understanding  Comments: 6 month MD f/up T-cell lymphoma. Pt states he is feeling fatigued. Denies any updates to med or hx. denies any fevers, night sweats. Port removed in May.

## 2024-11-13 NOTE — PROGRESS NOTES
Hematology/Oncology Clinic Follow Up Visit    Patient Name: Lenin Nagy  Medical Record Number: [id number]   YOB: 1940    PCP: Dr. Salas Cagle  Other providers: Dr. Ruslan Ortega, Dr. Elkin Weaver, Dr. Fritz Brewer, Dr. 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  No bowel herniation. There are mildly enlarged periaortic lymph nodes seen below the left renal hilum.  The largest lymph node is a new finding compared to a previous CT 10/9/2012.   -11/11/20- underwent ventral incisional herniorrhaphy, left axillary lymph node excisional biopsy (Dr. Elkin Weaver)- pathology of the left axillary lymph node showed multiple lymph nodes with nonspecific reactive features, no evidence of malignancy, flow cytometry is normal with polytypic B-cell expression.  -11/24/20- gallium PET/CT- There is nonspecific patchy low level uptake of the radiopharmaceutical within numerous lymph nodes in the lower neck, supraclavicular regions, mediastinum, nir, axillary regions, retropectoral regions, along both groins, and along the   external iliac chains.  These findings are nonspecific and clinical correlation is recommended.   12/15/20- CT guided bx of left external iliac lymph node- procedure aborted due to CT imaging showing improvement.  The left external iliac lymph node has decreased in size and currently measures 1.6 x 0.7 cm.  On prior CT this measured 2.7 x 1.6 cm.  No biopsy was performed.  -6/11/21- CT c/a/p- new enlarged retroperitoneal lymph node anterior to the IVC measuring up to 5.5 cm.  Other lymph nodes previously seen are small and stable or decreased in size  -7/6/21- EUS FNB of retroperitoneal LN (Dr. Brewer)- atypical lymphoid infiltrate, worrisome for involvement by lymphoma.   Reviewed at [facility].  The biopsy shows an infiltrate of T cells which stain as CD3 positive T cells and coexpress CD2, CD5 and variable CD30.  CD4 and CD8 do not appear to be coexpressed and CD7 is not expressed.  Slightly increased numbers of background CD20 and PAX 5+ B cells are also noted; some appear larger than normal but there is a spectrum of lymphocytes size within the CD20 positive B cells.  Cytokeratin AE1/AE3 is negative.  Due to the challenging aspects of the case is sent to [facility] for consultation.  Gene rearrangement analysis was performed on the paraffin block and showed an equivocal T-cell receptor gene rearrangement analysis.  No clonal immunoglobulin gene rearrangement was detected. Overall the findings are worrisome for lymphoma, particularly T-cell lymphoma, however the diagnosis cannot be confirmed on this biopsy material.  -7/22/21- PET/CT- Enlarged retroperitoneal lymph node just anterior to vena cava has increased FDG avidity with max SUV 6.7. Moderate increased metabolic activity is evident in a single left inguinal lymph node with maximum SUV of 5.8, however, this LN is not enlarged and demonstrates normal lymph node architecture. Other lymph nodes scattered throughout chest, abdomen and pelvis do not demonstrate any significant increased metabolic activity and are not pathologically enlarged.     -8/5/21- CT guided bx of RP LN/mass- atypical lymphohistiocytic infiltrate, worrisome for lymphoma.  Reviewed at [facility]. The findings remain concerning for lymphoma, particularly T-cell lymphoma. This appears to be a complex process by morphology and immunoarchitecture and therefore small biopsies, which do not widely sample the process are unlikely to result in adequate tissue for definitive diagnosis. Consider larger tissue biopsy.   Histologic sections show a lymphohistiocytic infiltrate composed of lymphoid cells with small nuclei, slightly irregular nuclear contours and  condensed chromatin intermixed with numerous histiocytes and scattered intermediate sized immunoblastic cells. There are few intermixed eosinophils.   Immunoperoxidase stains were performed at [facility] in [place], [place] (block A1: CD3, CD4, CD7, CD8, CD20, CD21, CD30,  (PD-1), CXCL13, ICOS, TCR betaF1, TCR delta). In situ hybridization for Fabiano-Barr virus encoded RNA (AMADUO) was performed at [facility].   There are modest numbers of T-cells with slight nuclear irregularities that stained for CD3, TCR betaF1,  (PD-1), ICOS and CXCL13 (partial). There are similar numbers of CD4 positive cells and CD8 positive cells. TCR delta positive T-cells are not increased. The T-cells are negative for all other markers tested. CD20 stains B-cells in loose aggregates and scattered interstitially. CD4 weakly stains numerous intermixed histiocytes. CD21 highlights scattered follicular dendritic cell meshworks. AMADOU and CD30 stains are negative.   T-cell receptor gene rearrangement, paraffin embedded tissue, performed at [facility] in [place], [place] ([id number], block A1): Invalid study due to inadequate amplification of T-cell receptor target DNA and internal control. This suggests that inhibitors of PCR or minimal target and total DNA are present.  -8/31/21-left inguinal lymph node surgical excision (Dr. Ortega)- pathology consistent with peripheral T-cell lymphoma, not otherwise specified.  Pathology reviewed at [facility]. While this T-cell lymphoma shares many immunomorphologic features with angioimmunoblastic T-cell lymphoma (AITL), because it lacks definitive CD4 expression it is best classified as peripheral T-cell lymphoma, not otherwise specified (PTCL-NOS). Given these AITL-like features, it is possible that this lymphoma may show clinical features similar to AITL.    Histologic sections show portions of multiple lymph nodes partially effaced by an atypical lymphoid infiltrate comprised of intermediate-sized  lymphocytes with irregular nuclei, vesicular chromatin, and abundant pale cytoplasm in a polymorphous background of small lymphocytes, eosinophils, small vessels, and scattered immunoblasts.   The neoplastic cells liborio as CD3-positive T-cells that co-express CD2, CD5, , CD10 (subset weak), BCL6 (subset), CXCL13, ICOS, CD30 (partial), TCR beta F1, and are negative for the remaining markers tested, including CD4 and CD8. The background shows scattered EBV-positive cells. Underlying follicular dendritic cell meshworks are focally expanded, highlighted by CD21. Background plasma cells overall show polytypic staining by kappa/lambda with focal areas showing skewing towards both lambda and kappa. The neoplastic infiltrate is best visualized on block A1 with only a focal fragment involved in block A2, obscured by crush artifact.   Flow cytometric analysis: CD19-positive B-cells show a kappa/lambda ratio of 1.3. CD3-positive T-cells show a CD4/CD8 ratio of 2.0.   Molecular studies, T-cell receptor gene rearrangement analysis, left inguinal lymph node, performed at [place], [place] ([id number]; [id number]; block A1, 8/31/2021): Positive. Clonal T-cell receptor gene rearrangement was detected.   -9/30/21- PET/CT- All of the lymph nodes listed below are subcentimeter in size. Uptake values are given in SUV maximum.   Right lower neck 2.7     Left supraclavicular 2.5     Another left supraclavicular 1.5     Upper left axillary 2.9     Lower left axilla 2.3     Right axillary scattered subcentimeter nodes, greatest uptake 2.9.   Left internal mammary 2.7     Subcarinal 4.5     Right hilar 4.5     AP window 3.4     Left hilum 3.3     Lower left hilum 2.5     Previously identified enlarged lymph node in the retroperitoneum anterior to the vena cava is no longer identified.  Numerous clips are present within the left groin, and stranding and possibly some fluid is region postsurgical in nature.  Uptake in the left  groin 5.6 SUV maximum.  Subcentimeter lymph nodes in the right groin without any surgery seen the right groin SUV max 2.6.  -10/1/21- bmbx-mildly hypercellular bone marrow with active trilineage hematopoiesis.  No definite evidence of lymphoma.  No increased blasts.  No significant dysplastic changes.  Flow cytometry and T-cell receptor gene rearrangement shows no monotypic lymphocyte population.  Normal karyotype.   -10/6/21- cycle 1 BV-CHP (dose modified given age/frailty- brentuximab vedotin 1.8 mg/kg, Cyclophosphamide 400mg/m2 IV, Doxorubicin 25mg/m2 IV all D1, Prednisone 100mg PO D1-5, pegfilgrastim D2; q21 days)  -10/28/21- cycle 2 BV-CHP  -11/18/21- cycle 3 BV-CHP  -12/7/21- PET/CT- Deauville score of 1, with resolution of lymph node uptake previously demonstrated, and decrease in the size of associated lymph nodes.  Concern for atypical pneumonia. New areas of pulmonary parenchymal FDG activity, mostly on the left, but also on the right, corresponding to areas of ground-glass opacity and lung consolidation, with the greatest level of uptake within the superior segment left lower lobe, showing SUV maximum 6.7.  This corresponds to a more dense area of ground-glass disease or developing lung consolidation.  Other areas have more clearly ground-glass opacity and have elevated although lesser degrees of elevation of activity.  These do not appear typical for neoplasm and are felt to be reflecting atypical pneumonia.     -Delayed cycle 4 due to severe fatigue and concern for pulmonary infiltrates.  Completed 21-day course of empiric therapeutic Bactrim for possible PJP and 10-day course of Levaquin for possible PNA  -1/5/22- CT chest- Overall there has been improvement in patchy bilateral ground-glass opacities within the lungs, now only slight residual airspace opacities present.  -1/6/22- cycle 4 BV-CHP (was delayed 4 weeks d/t severe fatigue and development of pulm infiltrates- PJP vs other PNA vs  pneumonitis?)   -dose reduced brentuximab to 1.4mg/m2 d/t possible pneumonitis and fatigue)   -1/27/22- cycle 5 BV-CHP  -1/27/22- LLE venous doppler- superficial thrombophlebitis involving the greater saphenous vein as well as extension into the popliteal vein.     -started Eliquis  -2/17/22- cycle 6 BV-CHP  -3/9/22- PET/CT- complete response, Deauville score= 1. No recurrent abnormal lymph node activity seen.  Resolution of pneumonia.   -4/26/22- LLE venous doppler- Diffuse left greater saphenous vein thrombus noted previously is unchanged.  Partial thrombus in the left popliteal vein noted previously has resolved.  There is no new DVT.  -7/25/22- PET/CT- There is no evidence of lymphoma within the neck, chest, abdomen, pelvis.  -2/7/23- PET/CT- multiple areas of increased FDG uptake noted, though does not appear to localize to any particular structure and felt to likely represent artifact. No mass or enlarged lymph nodes are seen.  -5/17/23- PET/CT- no evidence of lymphoma.  Deauville score 1.  -8/11/23- MRI brain- No evidence of an acute infarct.  Moderate FLAIR abnormalities in the white matter are likely sequelae of chronic small vessel ischemic disease.  Mild global parenchymal volume loss.   -11/13/23- PET/CT- no evidence of recurrent lymphoma.  Deauville score remains 1.  -5/13/24- PET/CT- No recurrent abnormal activity.  Deauville score 1.     ===============================  Interval events: Presents for follow-up.  His wife accompanies him to his visit today.      He remains on rivastigmine patch for history of dementia.  He follows with neurology.      Has ongoing fatigue, takes a lot of naps, though has been a little more physically active lately.  He continues to have poor balance and does not use his cane like he is supposed to but has not had any falls in the last 6 months.  He previously worked with physical therapy.  He denies any new aches or pains.      Chronic dyspnea with exertion remains  stable.      Urinary frequency with intermittent incontinence is unchanged.  He previously went for pelvic floor therapy and saw urology but has not followed up with them lately.    No recent fevers, infections, or drenching night sweats.     He has some constipation, uses MiraLAX but does not drink enough fluid.    He already received an influenza vaccine, updated COVID-19 vaccine booster, and RSV vaccine this season.    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    Colonoscopy  10/27/2020    DJP     Hernia surgery      x2    Laparoscopy, surgical prostatectomy, retropubic radical, w/nerve sparing  2008    Other  08/31/2021    Left inguinal lymph node biopsy    Other surgical history  11/11/2020    VENTRAL INCISIONAL HERNIA REPAIR, PRIMARY, LEFT AXILLARY LYMPH NODE EXCISIONAL BIOPSY    Removal gallbladder      Repair ing hernia,5+y/o,reducibl      Tonsillectomy       Home Medications:   RIVASTIGMINE 4.6 MG/24HR Transdermal Patch 24 Hr Place 1 patch onto the skin daily. 30 patch 0    AMLODIPINE 5 MG Oral Tab  Take 1 tablet (5 mg total) by mouth daily. 90 tablet 0    levothyroxine 25 MCG Oral Tab Take 1 tablet (25 mcg total) by mouth before breakfast. 90 tablet 1    escitalopram 5 MG Oral Tab Take 1 tablet (5 mg total) by mouth daily. 90 tablet 1    fluorouracil 5 % External Cream Apply to the affected areas twice daily for four weeks; during use the areas will feel irritated and look red and swollen      Nutritional Supplements (JUICE PLUS FIBRE OR) Take 1 tablet by mouth daily.      triamcinolone acetonide 0.1 % External Ointment Apply 1 Application topically daily as needed.        acetaminophen 500 MG Oral Tab Take 1 tablet (500 mg total) by mouth every 6 (six) hours as needed for Pain.       Allergies:   Allergies   Allergen Reactions    Emend [Aprepitant] HIVES     One single hive after completion of emend.     Other OTHER (SEE COMMENTS)     watery and itchy eyes- CATS    Penicillins RASH    Ragweed ITCHING and OTHER (SEE COMMENTS)     stuffiness       Psychosocial History:  Social History     Social History Narrative    .  6 adult children.  Used to be an ,      Social History     Socioeconomic History    Marital status:    Tobacco Use    Smoking status: Former     Current packs/day: 0.00     Average packs/day: 0.5 packs/day for 14.9 years (7.4 ttl pk-yrs)     Types: Cigarettes     Start date: 1953     Quit date: 1967     Years since quittin.9    Smokeless tobacco: Never    Tobacco comments:     quit over 50 years ago   Vaping Use    Vaping status: Never Used   Substance and Sexual Activity    Alcohol use: Not Currently    Drug use: Never   Other Topics Concern    Caffeine Concern Yes     Comment: coffee half a cup and occ coke    Exercise Yes     Comment: PT   Social History Narrative    .  6 adult children.  Used to be an ,      Social Drivers of Health      Received from [facility] [place], [place], formerly [facility]  [facility]    Social Connections     Family Medical History:  Family History   Problem Relation Age of Onset    Cancer Mother 53        Brain    Colon Cancer Father 73    Cancer Brother 55        Mesothelioma     Review of Systems:  A 10-point ROS was done with pertinent positives and negative per the HPI    Vital Signs:  Height: 157.5 cm (5' 2.01\") (11/13 1050)  Weight: 55.3 kg (122 lb) (11/13 1050)  BSA (Calculated - sq m): 1.55 sq meters (11/13 1050)  Pulse: 123 (11/13 1050)  BP: 139/75 (11/13 1050)  Temp: 98.6 °F (37 °C) (11/13 1050)  Do Not Use - Resp Rate: --  SpO2: 93 % (11/13 1050)    Wt Readings from Last 6 Encounters:   11/13/24 55.3 kg (122 lb)   07/06/24 57.7 kg (127 lb 4 oz)   05/15/24 57.5 kg (126 lb 12.8 oz)   03/15/24 59 kg (130 lb)   02/14/24 58.2 kg (128 lb 6.4 oz)   01/20/24 56.9 kg (125 lb 6 oz)     ECOG PS: 3    Physical Examination:  General: Patient is alert and oriented, not in acute distress.  +Impaired short-term memory  Psych: Mood and affect are appropriate  Eyes: EOMI  ENT: Oropharynx is clear  CV: Regular rate and rhythm, no murmurs  Respiratory: Lungs clear to auscultation bilaterally  GI/Abd: Soft, non-tender with normoactive bowel sounds, no hepatosplenomegaly  Neurological: Grossly intact.  Unsteady gait.  Lymphatics: No palpable lymphadenopathy.  Skin: no rashes or petechiae  Ext: no LE edema today    Laboratory:  Lab Results   Component Value Date    WBC 4.5 11/13/2024    WBC 4.4 05/15/2024    WBC 4.5 02/14/2024    HGB 15.1 11/13/2024    HGB 15.2 05/15/2024    HGB 14.6 02/14/2024    HCT 41.9 11/13/2024    MCV 93.5 11/13/2024    MCH 33.7 11/13/2024    MCHC 36.0 11/13/2024    RDW 13.2 11/13/2024    .0 (L) 11/13/2024    .0 (L) 05/15/2024    .0 02/14/2024     Lab Results   Component Value Date    GLU 91 11/13/2024    BUN 14 11/13/2024    BUNCREA 19.8 08/02/2021    CREATSERUM 0.89 11/13/2024    CREATSERUM 0.85 05/15/2024    CREATSERUM 0.82 02/14/2024     ANIONGAP <0 (L) 11/13/2024    GFRNAA 83 07/27/2022    GFRAA 96 07/27/2022    CA 9.1 11/13/2024    OSMOCALC 292 11/13/2024    ALKPHO 85 11/13/2024    AST 22 11/13/2024    ALT 13 11/13/2024    BILT 1.2 (H) 11/13/2024    TP 6.2 11/13/2024    ALB 4.0 11/13/2024    GLOBULIN 2.2 11/13/2024     11/13/2024    K 4.1 11/13/2024     11/13/2024    CO2 32.0 11/13/2024     Lab Results   Component Value Date    INR 1.1 05/30/2024     Lab Results   Component Value Date     11/13/2024     05/15/2024     02/14/2024     11/16/2023     08/17/2023     04/15/2023     01/13/2023     10/26/2022     07/27/2022     04/28/2022     03/10/2022     02/17/2022     (H) 01/27/2022     01/06/2022     (H) 12/30/2021     (H) 12/16/2021     (H) 12/09/2021     11/18/2021     10/28/2021     10/13/2021     10/06/2021     09/22/2021     08/02/2021     12/23/2020     Lab Results   Component Value Date    ESRML 8 12/23/2020     Lab Results   Component Value Date    CRP <0.29 12/23/2020     Lab Results   Component Value Date    PSA <0.01 01/13/2023    PSA <0.01 08/02/2021    PSA 0.01 11/24/2020     Component      Latest Ref Rng & Units 9/22/2021   Hbsag Screen Index       <0.10   HBSAg Screen      Nonreactive  Nonreactive   HEPATITIS B SURFACE AB QUAL      Nonreactive  Nonreactive   HEPATITIS B SURFACE AB QUANT      mIU/mL <3.10   HEPATITIS B CORE AB, TOTAL      Nonreactive  Nonreactive   HCV AB      Nonreactive  Nonreactive   HIV Antigen Antibody Combo      Non-Reactive Non-Reactive     Imaging:    TTE 9/25/21: Conclusions:   1. Left ventricle: The cavity size was normal. Wall thickness was normal. The estimated ejection fraction was 60-65%. No regional wall motion abnormalities. Doppler parameters are consistent with abnormal left ventricular relaxation - grade 1 diastolic  dysfunction.   2. Aortic valve: Trivial regurgitation.   3. Mitral valve: Mildly calcified annulus. Mitral valve demonstrates mildly thickened leaflets. There was mild regurgitation.   4. Left atrium: The left atrium was mildly dilated.   5. 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  Completed BV-CHP (dose reduced CHP as per \"mini-CHOP\" protocol) x 6 cycles 3/2022.  His end of treatment PET scan showed Deauville 1, complete response.    -He is now >2.5 years out from end of treatment - no evidence of recurrent disease at this time.    -Continue surveillance monitoring as below:   -Clinic visit with CBC, CMP, LDH q6mths until 5 yrs out   -No further routine imaging surveillance is needed    *Lymphopenia, immunodeficiency  -Related to lymphoma and prior chemotherapy.    -Continue to stay up-to-date with recommended vaccinations  -Repeat CBC with differential in 6 months     *LLE popliteal DVT and GSV thrombosis, dx'd 1/27/22  -completed 3 months of anticoagulation.  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 Remains off anticoagulation, no s/s of recurrence/progression.    *Gait instability  -Previously worked with physical therapy.  He was previously recommended to use a cane which I reiterated today.      *Urinary incontinence  -Chronic since prior prostatectomy.  Following with urology, previously had pelvic floor therapy.  I encouraged him to follow-up with urology.    *Dementia  -Currently on rivastigmine per neurology.    *Constipation  -Encouraged to continue MiraLAX, titrate as needed.  Advised that he needs to drink adequate fluids as well.    RTC in 6 months    Misha Goodwin MD  Hematology/Medical Oncology  [facility]    Risk level: High- T-cell lymphoma with multiple other comorbidities as detailed above requiring close monitoring.  ongoing continuity of complex care related to  malignancy.

## 2024-11-27 ENCOUNTER — OFFICE VISIT (OUTPATIENT)
Dept: NEUROLOGY | Facility: CLINIC | Age: 84
End: 2024-11-27
Payer: MEDICARE

## 2024-11-27 VITALS
DIASTOLIC BLOOD PRESSURE: 52 MMHG | OXYGEN SATURATION: 98 % | WEIGHT: 124 LBS | BODY MASS INDEX: 23 KG/M2 | HEART RATE: 70 BPM | SYSTOLIC BLOOD PRESSURE: 112 MMHG

## 2024-11-27 DIAGNOSIS — F03.A0 MILD DEMENTIA WITHOUT BEHAVIORAL DISTURBANCE, PSYCHOTIC DISTURBANCE, MOOD DISTURBANCE, OR ANXIETY, UNSPECIFIED DEMENTIA TYPE (HCC): Primary | ICD-10-CM

## 2024-11-27 PROCEDURE — 99213 OFFICE O/P EST LOW 20 MIN: CPT | Performed by: OTHER

## 2024-11-27 PROCEDURE — 1159F MED LIST DOCD IN RCRD: CPT | Performed by: OTHER

## 2024-11-27 RX ORDER — RIVASTIGMINE 4.6 MG/24H
1 PATCH, EXTENDED RELEASE TRANSDERMAL DAILY
Qty: 30 PATCH | Refills: 5 | Status: SHIPPED | OUTPATIENT
Start: 2024-11-27

## 2024-11-27 NOTE — PROGRESS NOTES
HPI:    Patient ID: Lenin Nagy is a 84 year old male.  PCP: Dr Tsering Muñiz    Dementia    Memory Loss  The patient's primary symptoms include memory loss. Associated symptoms include dizziness.         Patient is a 84-year-old male presented for follow-up for mild vascular dementia.  He is here along with his wife reports no change in his memory or cognition.  Patient is doing well on Exelon patch.  States dizziness improved, still happens but better than before  He was placed back on amlodipine as his blood pressure was rising up.        Patient is a 83 year old male who presents with complaints of dizziness and gait imbalance.  He has chronic dizziness recently it has increased and had fallen twice in the last 2 weeks. Get dizzy with positional change, standing or walking, sometime room spinning sensation.   Last visit in Oct 2023 we recommended discussing with his primary care regarding reducing the dose of his antihypertensive. On Amlodipine 5 mg daily. Patient does not drink enough water due to increase urinary frequency due to prostate issues. His BP today is 90/64 and feels dizzy lightheaded. Appetite and weight is stable      Anup Scott is a 83 yo male with history of Prostate cancer 15 years ago, urinary incontinence, Lymphoma s/p chemotherapy, hypertension who presents for evaluation of dizziness. Patient reports dizziness and lightheadedness is intermittent, comes and goes and today he does not feel dizzy at all. He denies any vertigo. He was having some double vision looking to the left but not anymore. Wife reports his balance is poor and drifts to the right and has fallen in the past. He denies any tingling and numbness in feet. Had Chemo in 2021 for Lymphoma and cancer free for last 6 months. States recent PET scan was unremarkable  Wife states his memory is declining, getting more forgetful and confused. States during a hospital admission he got delirious and was seeing people. States no  visual hallucinations at home. No family history of Dementia.     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    Colonoscopy  10/27/2020    DJP     Hernia surgery      x2    Laparoscopy, surgical prostatectomy, retropubic radical, w/nerve sparing      Other  2021    Left inguinal lymph node biopsy    Other surgical history  2020    VENTRAL INCISIONAL HERNIA REPAIR, PRIMARY, LEFT AXILLARY LYMPH NODE EXCISIONAL BIOPSY    Removal gallbladder      Repair ing hernia,5+y/o,reducibl      Tonsillectomy        Family History   Problem Relation Age of Onset    Cancer Mother 53        Brain    Colon Cancer Father 73    Cancer Brother 55        Mesothelioma      Social History     Socioeconomic History    Marital status:    Tobacco Use    Smoking status: Former     Current packs/day: 0.00     Average packs/day: 0.5 packs/day for 14.9 years (7.4 ttl pk-yrs)     Types: Cigarettes     Start date: 1953     Quit date: 1967     Years since quittin.0    Smokeless tobacco: Never    Tobacco comments:      quit over 50 years ago   Vaping Use    Vaping status: Never Used   Substance and Sexual Activity    Alcohol use: Not Currently    Drug use: Never   Other Topics Concern    Caffeine Concern Yes     Comment: coffee half a cup and occ coke    Exercise Yes     Comment: walking   Social History Narrative    .  6 adult children.  Used to be an ,      Social Drivers of Health      [facility]    Social Connections        Review of Systems   Constitutional: Negative.    HENT: Negative.     Eyes: Negative.    Respiratory: Negative.     Cardiovascular: Negative.    Gastrointestinal: Negative.    Endocrine: Negative.    Genitourinary: Negative.    Musculoskeletal:  Positive for gait problem.   Skin: Negative.    Allergic/Immunologic: Negative.    Neurological:  Positive for dizziness.   Hematological: Negative.    Psychiatric/Behavioral:  Positive for decreased concentration and memory loss.    All other systems reviewed and are negative.           Current Outpatient Medications   Medication Sig Dispense Refill    RIVASTIGMINE 4.6 MG/24HR Transdermal Patch 24 Hr Place 1 patch onto the skin daily. 30 patch 0    AMLODIPINE 5 MG Oral Tab Take 1 tablet (5 mg total) by mouth daily. 90 tablet 0    levothyroxine 25 MCG Oral Tab Take 1 tablet (25 mcg total) by mouth before breakfast. 90 tablet 1    escitalopram 5 MG Oral Tab Take 1 tablet (5 mg total) by mouth daily. 90 tablet 1    fluorouracil 5 % External Cream Apply to the affected areas twice daily for four weeks; during use the areas will feel irritated and look red and swollen      Nutritional Supplements (JUICE PLUS FIBRE OR) Take 1 tablet by mouth daily.      triamcinolone acetonide 0.1 % External Ointment Apply 1 Application topically daily as needed.        acetaminophen 500 MG Oral Tab Take 1 tablet (500 mg total) by mouth every 6 (six) hours as needed for Pain.        Allergies:  Allergies   Allergen Reactions    Emend [Aprepitant] HIVES     One single hive after completion of emend.     Other OTHER (SEE COMMENTS)     watery and itchy eyes- CATS    Penicillins RASH    Ragweed ITCHING and OTHER (SEE COMMENTS)     stuffiness     PHYSICAL EXAM:   Physical Exam  Blood pressure 112/52, pulse 70, weight 124 lb (56.2 kg), SpO2 98%.     General Appearance: Well nourished, well developed, no apparent distress.   HEENT: Normocephalic and atraumatic  Cardiovascular: Normal rate, regular rhythm and normal heart sounds.    Pulmonary/Chest: Effort normal and breath sounds normal.   Abdominal: Soft. Bowel sounds are normal.   Skin: dry, clean and intact  Ext: peripheral pulses present  Psych: normal mood and affect    Neurological:  Patient is awake, alert and oriented to person, place and time   Speech is fluent with intact comprehension  MOCA 21/30  Visuospatial skills/executive 1/5  Naming 3/3  Attention 3/3  Calculation 33  Language 2/3  Abstraction 2/2  Recall 1/5  Orientation 6/6    Cranial Nerves:   II: Visual fields: normal  III: Pupils: equal, round, reactive to light  III,IV,VI: Extra Ocular Movements: intact  V: Facial sensation: intact  VII: Facial strength: intact  VIII: Hearing: Impaired hearing  IX: Palate: intact  XI: Shoulder shrug: intact  XII: Tongue movement: normal    Motor: Normal tone. Strength is  5 out of 5 in all extremities bilaterally.    Sensory: Sensory examination is normal to light touch and pinprick     Coordination: grossly intact    Gait: unsteady gait but no shuffling noted      TESTS/IMAGING:     MRI brain        Impression   CONCLUSION:       1. No evidence of an acute infarct.     2. Moderate FLAIR abnormalities in the white matter are likely sequelae of chronic small vessel ischemic disease.     3. Mild global parenchymal volume loss.            ASSESSMENT/PLAN:         ICD-10-CM    1. Mild dementia without behavioral disturbance, psychotic  disturbance, mood disturbance, or anxiety, unspecified dementia type (HCC)  F03.A0            Mild dementia likely vascular in nature  Intolerant to donepezil.  Was switched to Exelon patch   We will repeat cognitive testing next visit  Counseled on cognitive activities    Follow up in 6 months    See orders and medications filed with this encounter. The patient indicates understanding of these issues and agrees with the plan.          Maico Cortez MD  [facility] [place]      Meds This Visit:  Requested Prescriptions      No prescriptions requested or ordered in this encounter       Imaging & Referrals:  None     ID#[id number]

## 2024-11-27 NOTE — PATIENT INSTRUCTIONS
Refill policies:    Allow 2-3 business days for refills; controlled substances may take longer.  Contact your pharmacy at least 5 days prior to running out of medication and have them send an electronic request or submit request through the “request refill” option in your [employer] account.  Refills are not addressed on weekends; covering physicians do not authorize routine medications on weekends.  No narcotics or controlled substances are refilled after noon on Fridays or by on call physicians.  By law, narcotics must be electronically prescribed.  A 30 day supply with no refills is the maximum allowed.  If your prescription is due for a refill, you may be due for a follow up appointment.  To best provide you care, patients receiving routine medications need to be seen at least once a year.  Patients receiving narcotic/controlled substance medications need to be seen at least once every 3 months.  In the event that your preferred pharmacy does not have the requested medication in stock (e.g. Backordered), it is your responsibility to find another pharmacy that has the requested medication available.  We will gladly send a new prescription to that pharmacy at your request.    Scheduling Tests:    If your physician has ordered radiology tests such as MRI or CT scans, please contact Central Scheduling at [phone] right away to schedule the test.  Once scheduled, the [facility] Centralized Referral Team will work with your insurance carrier to obtain pre-certification or prior authorization.  Depending on your insurance carrier, approval may take 3-10 days.  It is highly recommended patients assure they have received an authorization before having a test performed.  If test is done without insurance authorization, patient may be responsible for the entire amount billed.      Precertification and Prior Authorizations:  If your physician has recommended that you have a procedure or additional testing performed the [facility]  Centralized Referral Team will contact your insurance carrier to obtain pre-certification or prior authorization.    You are strongly encouraged to contact your insurance carrier to verify that your procedure/test has been approved and is a COVERED benefit.  Although the [facility] Centralized Referral Team does its due diligence, the insurance carrier gives the disclaimer that \"Although the procedure is authorized, this does not guarantee payment.\"    Ultimately the patient is responsible for payment.   Thank you for your understanding in this matter.  Paperwork Completion:  If you require FMLA or disability paperwork for your recovery, please make sure to either drop it off or have it faxed to our office at [phone]. Be sure the form has your name and date of birth on it.  The form will be faxed to our Forms Department and they will complete it for you.  There is a 25$ fee for all forms that need to be filled out.  Please be aware there is a 10-14 day turnaround time.  You will need to sign a release of information (RAD) form if your paperwork does not come with one.  You may call the Forms Department with any questions at [phone].  Their fax number is [phone].

## 2024-12-29 DIAGNOSIS — F02.80 ALZHEIMER'S DISEASE, UNSPECIFIED (HCC): ICD-10-CM

## 2024-12-29 DIAGNOSIS — G30.9 ALZHEIMER'S DISEASE, UNSPECIFIED (HCC): ICD-10-CM

## 2024-12-29 DIAGNOSIS — E03.4 HYPOTHYROIDISM DUE TO ACQUIRED ATROPHY OF THYROID: ICD-10-CM

## 2024-12-30 RX ORDER — LEVOTHYROXINE SODIUM 25 UG/1
25 TABLET ORAL
Qty: 90 TABLET | Refills: 0 | Status: SHIPPED | OUTPATIENT
Start: 2024-12-30

## 2024-12-30 RX ORDER — ESCITALOPRAM OXALATE 5 MG/1
5 TABLET ORAL DAILY
Qty: 90 TABLET | Refills: 0 | Status: SHIPPED | OUTPATIENT
Start: 2024-12-30

## 2024-12-30 NOTE — TELEPHONE ENCOUNTER
OV 07/06/24  LABS 01/20/24 TSH    REFILL  -Levothyroxine 25 mcg 07/06/24 #90 +1 RF  -Escitalopram 5 mg 07/06/24 #90 +1 RF    Future Appointments   Date Time Provider Department Center   5/14/2025 10:30 AM Misha Goodwin MD NP [facility]     TSH LABS WERE DUE 04/20/24.

## 2025-01-13 DIAGNOSIS — F03.A0 MILD DEMENTIA WITHOUT BEHAVIORAL DISTURBANCE, PSYCHOTIC DISTURBANCE, MOOD DISTURBANCE, OR ANXIETY, UNSPECIFIED DEMENTIA TYPE (HCC): ICD-10-CM

## 2025-01-13 RX ORDER — RIVASTIGMINE 4.6 MG/24H
1 PATCH, EXTENDED RELEASE TRANSDERMAL DAILY
Qty: 30 PATCH | Refills: 5 | OUTPATIENT
Start: 2025-01-13

## 2025-01-14 NOTE — TELEPHONE ENCOUNTER
Spoke with the pharmacy who states that the patient has refills remaining. Will refuse.       Medication: rivastigmine 4.6 MG/24HR Transdermal Patch 24 Hr      Date of last refill: 11/27/2024 (#30 patch/5)  [place]     Last office visit: 11/27/2024  Due back to clinic per last office note:  Around 05/27/2025  Date next office visit scheduled:    Future Appointments   Date Time Provider Department Center   5/14/2025 10:30 AM Misha Goodwin MD NP [facility]           Last OV note recommendation:    ASSESSMENT/PLAN:             ICD-10-CM     1. Mild dementia without behavioral disturbance, psychotic disturbance, mood disturbance, or anxiety, unspecified dementia type (HCC)  F03.A0                Mild dementia likely vascular in nature  Intolerant to donepezil.  Was switched to Exelon patch   We will repeat cognitive testing next visit  Counseled on cognitive activities     Follow up in 6 months     See orders and medications filed with this encounter. The patient indicates understanding of these issues and agrees with the plan.

## 2025-03-20 DIAGNOSIS — I10 ESSENTIAL HYPERTENSION: ICD-10-CM

## 2025-03-20 RX ORDER — AMLODIPINE BESYLATE 5 MG/1
5 TABLET ORAL DAILY
Qty: 90 TABLET | Refills: 0 | Status: SHIPPED | OUTPATIENT
Start: 2025-03-20

## 2025-03-20 NOTE — TELEPHONE ENCOUNTER
Hypertension Medications Protocol [id number]/20/2025 12:46 PM   Protocol Details CMP or BMP in past 12 months    Last BP reading less than 140/90    In person appointment or virtual visit in the past 12 mos or appointment in next 3 mos    EGFRCR or GFRNAA > 50    Medication is active on med list        Last office visit:  07/06/24    Last refill:  07/15/24  #90, no refills  Last cmp:  11/13/24  BP Readings from Last 3 Encounters:   11/27/24 112/52   11/13/24 139/75   07/06/24 107/70

## 2025-04-01 DIAGNOSIS — F02.80 ALZHEIMER'S DISEASE, UNSPECIFIED (HCC): ICD-10-CM

## 2025-04-01 DIAGNOSIS — G30.9 ALZHEIMER'S DISEASE, UNSPECIFIED (HCC): ICD-10-CM

## 2025-04-02 RX ORDER — ESCITALOPRAM OXALATE 5 MG/1
5 TABLET ORAL DAILY
Qty: 90 TABLET | Refills: 2 | Status: SHIPPED | OUTPATIENT
Start: 2025-04-02

## 2025-04-24 ENCOUNTER — TELEPHONE (OUTPATIENT)
Dept: FAMILY MEDICINE CLINIC | Facility: CLINIC | Age: 85
End: 2025-04-24

## 2025-04-24 NOTE — TELEPHONE ENCOUNTER
Called daughter to discuss Armond PCP and she said he has moved and transferred to a [facility] provider. She said they will make sure the insurance is aware of the new PCP.

## 2025-05-01 NOTE — PROGRESS NOTES
Education Record    Learner:  Patient    Disease / Diagnosis:ziextenzo injection    Barriers / Limitations:  None   Comments:    Method:  Discussion   Comments:    General Topics:  Medication, Side effects and symptom management and Plan of care reviewed no

## 2025-05-07 ENCOUNTER — TELEPHONE (OUTPATIENT)
Dept: FAMILY MEDICINE CLINIC | Facility: CLINIC | Age: 85
End: 2025-05-07

## 2025-05-07 NOTE — TELEPHONE ENCOUNTER
Annita Cloud    4/24/25  9:12 AM  Note     Called daughter to discuss Armond PCP and she said he has moved and transferred to a [facility] provider. She said they will make sure the insurance is aware of the new PCP.

## 2025-05-08 ENCOUNTER — PATIENT OUTREACH (OUTPATIENT)
Dept: CASE MANAGEMENT | Age: 85
End: 2025-05-08

## 2025-05-08 NOTE — PROCEDURES
The office order for PCP removal request is Approved and finalized on May 8, 2025.    Removed Salas Cagle DO as the patient's Primary Care Physician

## 2025-05-14 ENCOUNTER — OFFICE VISIT (OUTPATIENT)
Age: 85
End: 2025-05-14
Attending: INTERNAL MEDICINE
Payer: MEDICARE

## 2025-05-14 VITALS
WEIGHT: 128.19 LBS | BODY MASS INDEX: 23.29 KG/M2 | DIASTOLIC BLOOD PRESSURE: 74 MMHG | HEART RATE: 58 BPM | RESPIRATION RATE: 16 BRPM | OXYGEN SATURATION: 96 % | TEMPERATURE: 98 F | HEIGHT: 62.01 IN | SYSTOLIC BLOOD PRESSURE: 150 MMHG

## 2025-05-14 DIAGNOSIS — R26.81 UNSTABLE GAIT: ICD-10-CM

## 2025-05-14 DIAGNOSIS — K59.09 OTHER CONSTIPATION: ICD-10-CM

## 2025-05-14 DIAGNOSIS — D84.81 IMMUNODEFICIENCY DUE TO CONDITIONS CLASSIFIED ELSEWHERE (HCC): ICD-10-CM

## 2025-05-14 DIAGNOSIS — C84.48 PERIPHERAL T-CELL LYMPHOMA OF LYMPH NODES OF MULTIPLE REGIONS (HCC): Primary | ICD-10-CM

## 2025-05-14 LAB
ALBUMIN SERPL-MCNC: 4.1 G/DL (ref 3.2–4.8)
ALBUMIN/GLOB SERPL: 1.9 {RATIO} (ref 1–2)
ALP LIVER SERPL-CCNC: 91 U/L (ref 45–117)
ALT SERPL-CCNC: 11 U/L (ref 10–49)
ANION GAP SERPL CALC-SCNC: 7 MMOL/L (ref 0–18)
AST SERPL-CCNC: 18 U/L (ref ?–34)
BASOPHILS # BLD AUTO: 0.05 X10(3) UL (ref 0–0.2)
BASOPHILS NFR BLD AUTO: 1 %
BILIRUB SERPL-MCNC: 0.8 MG/DL (ref 0.2–1.1)
BUN BLD-MCNC: 14 MG/DL (ref 9–23)
CALCIUM BLD-MCNC: 9.5 MG/DL (ref 8.7–10.6)
CHLORIDE SERPL-SCNC: 109 MMOL/L (ref 98–112)
CO2 SERPL-SCNC: 26 MMOL/L (ref 21–32)
CREAT BLD-MCNC: 0.92 MG/DL (ref 0.7–1.3)
EGFRCR SERPLBLD CKD-EPI 2021: 83 ML/MIN/1.73M2 (ref 60–?)
EOSINOPHIL # BLD AUTO: 0.14 X10(3) UL (ref 0–0.7)
EOSINOPHIL NFR BLD AUTO: 2.8 %
ERYTHROCYTE [DISTWIDTH] IN BLOOD BY AUTOMATED COUNT: 12.9 %
FASTING STATUS PATIENT QL REPORTED: NO
GLOBULIN PLAS-MCNC: 2.2 G/DL (ref 2–3.5)
GLUCOSE BLD-MCNC: 99 MG/DL (ref 70–99)
HCT VFR BLD AUTO: 40.4 % (ref 39–53)
HGB BLD-MCNC: 14.5 G/DL (ref 13–17.5)
IMM GRANULOCYTES # BLD AUTO: 0.01 X10(3) UL (ref 0–1)
IMM GRANULOCYTES NFR BLD: 0.2 %
LDH SERPL L TO P-CCNC: 202 U/L (ref 120–246)
LYMPHOCYTES # BLD AUTO: 0.6 X10(3) UL (ref 1–4)
LYMPHOCYTES NFR BLD AUTO: 11.9 %
MCH RBC QN AUTO: 33.6 PG (ref 26–34)
MCHC RBC AUTO-ENTMCNC: 35.9 G/DL (ref 31–37)
MCV RBC AUTO: 93.7 FL (ref 80–100)
MONOCYTES # BLD AUTO: 0.49 X10(3) UL (ref 0.1–1)
MONOCYTES NFR BLD AUTO: 9.7 %
NEUTROPHILS # BLD AUTO: 3.76 X10 (3) UL (ref 1.5–7.7)
NEUTROPHILS # BLD AUTO: 3.76 X10(3) UL (ref 1.5–7.7)
NEUTROPHILS NFR BLD AUTO: 74.4 %
OSMOLALITY SERPL CALC.SUM OF ELEC: 295 MOSM/KG (ref 275–295)
PLATELET # BLD AUTO: 146 10(3)UL (ref 150–450)
POTASSIUM SERPL-SCNC: 4.2 MMOL/L (ref 3.5–5.1)
PROT SERPL-MCNC: 6.3 G/DL (ref 5.7–8.2)
RBC # BLD AUTO: 4.31 X10(6)UL (ref 3.8–5.8)
SODIUM SERPL-SCNC: 142 MMOL/L (ref 136–145)
WBC # BLD AUTO: 5.1 X10(3) UL (ref 4–11)

## 2025-05-14 NOTE — PROGRESS NOTES
Outpatient Oncology Care Plan  Problem list:  Knowledge deficit  Fatigue     Problems related to:    chemotherapy  disease/disease progression  side effect of treatment     Interventions:  provided general teaching     Expected outcomes:  understands plan of care     Progress towards outcome:  making progress     Education Record     Learner:  Patient  Barriers / Limitations:  None  Method:  Brief focused  Outcome:  Shows understanding  Comments: 6 month MD f/up T-cell lymphoma. Pt states he is feeling ok. Energy could be better and wife states he takes frequent naps. Denies any updates to med or hx. denies any fevers, night sweats.

## 2025-05-14 NOTE — PROGRESS NOTES
Hematology/Oncology Clinic Follow Up Visit    Patient Name: Lenin Nagy  Medical Record Number: [id number]   YOB: 1940    PCP: Dr. Salas Cagle  Other providers: Dr. Ruslan Ortega, Dr. Elkin Weaver, Dr. Fritz Brewer, Dr. 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  No bowel herniation. There are mildly enlarged periaortic lymph nodes seen below the left renal hilum.  The largest lymph node is a new finding compared to a previous CT 10/9/2012.   -11/11/20- underwent ventral incisional herniorrhaphy, left axillary lymph node excisional biopsy (Dr. Elkin Weaver)- pathology of the left axillary lymph node showed multiple lymph nodes with nonspecific reactive features, no evidence of malignancy, flow cytometry is normal with polytypic B-cell expression.  -11/24/20- gallium PET/CT- There is nonspecific patchy low level uptake of the radiopharmaceutical within numerous lymph nodes in the lower neck, supraclavicular regions, mediastinum, nir, axillary regions, retropectoral regions, along both groins, and along the   external iliac chains.  These findings are nonspecific and clinical correlation is recommended.   12/15/20- CT guided bx of left external iliac lymph node- procedure aborted due to CT imaging showing improvement.  The left external iliac lymph node has decreased in size and currently measures 1.6 x 0.7 cm.  On prior CT this measured 2.7 x 1.6 cm.  No biopsy was performed.  -6/11/21- CT c/a/p- new enlarged retroperitoneal lymph node anterior to the IVC measuring up to 5.5 cm.  Other lymph nodes previously seen are small and stable or decreased in size  -7/6/21- EUS FNB of retroperitoneal LN (Dr. Brewer)- atypical lymphoid infiltrate, worrisome for involvement by lymphoma.   Reviewed at [facility].  The biopsy shows an infiltrate of T cells which stain as CD3 positive T cells and coexpress CD2, CD5 and variable CD30.  CD4 and CD8 do not appear to be coexpressed and CD7 is not expressed.  Slightly increased numbers of background CD20 and PAX 5+ B cells are also noted; some appear larger than normal but there is a spectrum of lymphocytes size within the CD20 positive B cells.  Cytokeratin AE1/AE3 is negative.  Due to the challenging aspects of the case is sent to [facility] for consultation.  Gene rearrangement analysis was performed on the paraffin block and showed an equivocal T-cell receptor gene rearrangement analysis.  No clonal immunoglobulin gene rearrangement was detected. Overall the findings are worrisome for lymphoma, particularly T-cell lymphoma, however the diagnosis cannot be confirmed on this biopsy material.  -7/22/21- PET/CT- Enlarged retroperitoneal lymph node just anterior to vena cava has increased FDG avidity with max SUV 6.7. Moderate increased metabolic activity is evident in a single left inguinal lymph node with maximum SUV of 5.8, however, this LN is not enlarged and demonstrates normal lymph node architecture. Other lymph nodes scattered throughout chest, abdomen and pelvis do not demonstrate any significant increased metabolic activity and are not pathologically enlarged.     -8/5/21- CT guided bx of RP LN/mass- atypical lymphohistiocytic infiltrate, worrisome for lymphoma.  Reviewed at [facility]. The findings remain concerning for lymphoma, particularly T-cell lymphoma. This appears to be a complex process by morphology and immunoarchitecture and therefore small biopsies, which do not widely sample the process are unlikely to result in adequate tissue for definitive diagnosis. Consider larger tissue biopsy.   Histologic sections show a lymphohistiocytic infiltrate composed of lymphoid cells with small nuclei, slightly irregular nuclear contours and  condensed chromatin intermixed with numerous histiocytes and scattered intermediate sized immunoblastic cells. There are few intermixed eosinophils.   Immunoperoxidase stains were performed at [facility] in [place], [place] (block A1: CD3, CD4, CD7, CD8, CD20, CD21, CD30,  (PD-1), CXCL13, ICOS, TCR betaF1, TCR delta). In situ hybridization for Fabiano-Barr virus encoded RNA (AMADOU) was performed at [facility].   There are modest numbers of T-cells with slight nuclear irregularities that stained for CD3, TCR betaF1,  (PD-1), ICOS and CXCL13 (partial). There are similar numbers of CD4 positive cells and CD8 positive cells. TCR delta positive T-cells are not increased. The T-cells are negative for all other markers tested. CD20 stains B-cells in loose aggregates and scattered interstitially. CD4 weakly stains numerous intermixed histiocytes. CD21 highlights scattered follicular dendritic cell meshworks. AMADOU and CD30 stains are negative.   T-cell receptor gene rearrangement, paraffin embedded tissue, performed at [facility] in [place], [place] ([id number], block A1): Invalid study due to inadequate amplification of T-cell receptor target DNA and internal control. This suggests that inhibitors of PCR or minimal target and total DNA are present.  -8/31/21-left inguinal lymph node surgical excision (Dr. Ortega)- pathology consistent with peripheral T-cell lymphoma, not otherwise specified.  Pathology reviewed at [facility]. While this T-cell lymphoma shares many immunomorphologic features with angioimmunoblastic T-cell lymphoma (AITL), because it lacks definitive CD4 expression it is best classified as peripheral T-cell lymphoma, not otherwise specified (PTCL-NOS). Given these AITL-like features, it is possible that this lymphoma may show clinical features similar to AITL.    Histologic sections show portions of multiple lymph nodes partially effaced by an atypical lymphoid infiltrate comprised of intermediate-sized  lymphocytes with irregular nuclei, vesicular chromatin, and abundant pale cytoplasm in a polymorphous background of small lymphocytes, eosinophils, small vessels, and scattered immunoblasts.   The neoplastic cells liborio as CD3-positive T-cells that co-express CD2, CD5, , CD10 (subset weak), BCL6 (subset), CXCL13, ICOS, CD30 (partial), TCR beta F1, and are negative for the remaining markers tested, including CD4 and CD8. The background shows scattered EBV-positive cells. Underlying follicular dendritic cell meshworks are focally expanded, highlighted by CD21. Background plasma cells overall show polytypic staining by kappa/lambda with focal areas showing skewing towards both lambda and kappa. The neoplastic infiltrate is best visualized on block A1 with only a focal fragment involved in block A2, obscured by crush artifact.   Flow cytometric analysis: CD19-positive B-cells show a kappa/lambda ratio of 1.3. CD3-positive T-cells show a CD4/CD8 ratio of 2.0.   Molecular studies, T-cell receptor gene rearrangement analysis, left inguinal lymph node, performed at [place], [place] ([id number]; [id number]; block A1, 8/31/2021): Positive. Clonal T-cell receptor gene rearrangement was detected.   -9/30/21- PET/CT- All of the lymph nodes listed below are subcentimeter in size. Uptake values are given in SUV maximum.   Right lower neck 2.7     Left supraclavicular 2.5     Another left supraclavicular 1.5     Upper left axillary 2.9     Lower left axilla 2.3     Right axillary scattered subcentimeter nodes, greatest uptake 2.9.   Left internal mammary 2.7     Subcarinal 4.5     Right hilar 4.5     AP window 3.4     Left hilum 3.3     Lower left hilum 2.5     Previously identified enlarged lymph node in the retroperitoneum anterior to the vena cava is no longer identified.  Numerous clips are present within the left groin, and stranding and possibly some fluid is region postsurgical in nature.  Uptake in the left  groin 5.6 SUV maximum.  Subcentimeter lymph nodes in the right groin without any surgery seen the right groin SUV max 2.6.  -10/1/21- bmbx-mildly hypercellular bone marrow with active trilineage hematopoiesis.  No definite evidence of lymphoma.  No increased blasts.  No significant dysplastic changes.  Flow cytometry and T-cell receptor gene rearrangement shows no monotypic lymphocyte population.  Normal karyotype.   -10/6/21- cycle 1 BV-CHP (dose modified given age/frailty- brentuximab vedotin 1.8 mg/kg, Cyclophosphamide 400mg/m2 IV, Doxorubicin 25mg/m2 IV all D1, Prednisone 100mg PO D1-5, pegfilgrastim D2; q21 days)  -10/28/21- cycle 2 BV-CHP  -11/18/21- cycle 3 BV-CHP  -12/7/21- PET/CT- Deauville score of 1, with resolution of lymph node uptake previously demonstrated, and decrease in the size of associated lymph nodes.  Concern for atypical pneumonia. New areas of pulmonary parenchymal FDG activity, mostly on the left, but also on the right, corresponding to areas of ground-glass opacity and lung consolidation, with the greatest level of uptake within the superior segment left lower lobe, showing SUV maximum 6.7.  This corresponds to a more dense area of ground-glass disease or developing lung consolidation.  Other areas have more clearly ground-glass opacity and have elevated although lesser degrees of elevation of activity.  These do not appear typical for neoplasm and are felt to be reflecting atypical pneumonia.     -Delayed cycle 4 due to severe fatigue and concern for pulmonary infiltrates.  Completed 21-day course of empiric therapeutic Bactrim for possible PJP and 10-day course of Levaquin for possible PNA  -1/5/22- CT chest- Overall there has been improvement in patchy bilateral ground-glass opacities within the lungs, now only slight residual airspace opacities present.  -1/6/22- cycle 4 BV-CHP (was delayed 4 weeks d/t severe fatigue and development of pulm infiltrates- PJP vs other PNA vs  pneumonitis?)   -dose reduced brentuximab to 1.4mg/m2 d/t possible pneumonitis and fatigue)   -1/27/22- cycle 5 BV-CHP  -1/27/22- LLE venous doppler- superficial thrombophlebitis involving the greater saphenous vein as well as extension into the popliteal vein.     -started Eliquis  -2/17/22- cycle 6 BV-CHP  -3/9/22- PET/CT- complete response, Deauville score= 1. No recurrent abnormal lymph node activity seen.  Resolution of pneumonia.   -4/26/22- LLE venous doppler- Diffuse left greater saphenous vein thrombus noted previously is unchanged.  Partial thrombus in the left popliteal vein noted previously has resolved.  There is no new DVT.  -7/25/22- PET/CT- There is no evidence of lymphoma within the neck, chest, abdomen, pelvis.  -2/7/23- PET/CT- multiple areas of increased FDG uptake noted, though does not appear to localize to any particular structure and felt to likely represent artifact. No mass or enlarged lymph nodes are seen.  -5/17/23- PET/CT- no evidence of lymphoma.  Deauville score 1.  -8/11/23- MRI brain- No evidence of an acute infarct.  Moderate FLAIR abnormalities in the white matter are likely sequelae of chronic small vessel ischemic disease.  Mild global parenchymal volume loss.   -11/13/23- PET/CT- no evidence of recurrent lymphoma.  Deauville score remains 1.  -5/13/24- PET/CT- No recurrent abnormal activity.  Deauville score 1.     ===============================  Interval events: Presents for follow-up.  His wife accompanies him to his visit today.      He remains on rivastigmine patch for history of dementia.  He follows with neurology.  His wife reports that his dementia has been worsening, he sometimes tries to get out of the house and is confused.  They have had to put alarms on the doors.  They plan to increase the dose of his rivastigmine patch later this month.    Has ongoing fatigue, takes a lot of naps, though he states that he has been more physically active lately.   He denies any  new aches or pains.      Chronic dyspnea with exertion remains stable.      Constipation is better controlled with fiber one supplements.    No recent fevers, infections, or drenching night sweats.     He received an influenza vaccine, updated COVID-19 vaccine booster, and RSV vaccine fall 2024.    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    Colonoscopy  10/27/2020    DJP     Hernia surgery      x2    Laparoscopy, surgical prostatectomy, retropubic radical, w/nerve sparing  2008    Other  08/31/2021    Left inguinal lymph node biopsy    Other surgical history  11/11/2020    VENTRAL INCISIONAL HERNIA REPAIR, PRIMARY, LEFT AXILLARY LYMPH NODE EXCISIONAL BIOPSY    Removal gallbladder      Repair ing hernia,5+y/o,reducibl      Tonsillectomy       Home Medications:   escitalopram 5 MG Oral Tab Take 1 tablet (5 mg total) by mouth daily. 90 tablet 2    AMLODIPINE 5 MG Oral Tab Take 1 tablet (5 mg total) by mouth daily. 90 tablet 0    LEVOTHYROXINE 25 MCG Oral Tab Take 1 tablet (25 mcg total) by mouth before  breakfast. 90 tablet 0    rivastigmine 4.6 MG/24HR Transdermal Patch 24 Hr Place 1 patch onto the skin daily. 30 patch 5    fluorouracil 5 % External Cream       Nutritional Supplements (JUICE PLUS FIBRE OR) Take 1 tablet by mouth in the morning.      triamcinolone acetonide 0.1 % External Ointment Apply 1 Application  topically daily as needed.      acetaminophen 500 MG Oral Tab Take 1 tablet (500 mg total) by mouth every 6 (six) hours as needed for Pain.       Allergies:   Allergies   Allergen Reactions    Emend [Aprepitant] HIVES     One single hive after completion of emend.     Other OTHER (SEE COMMENTS)     watery and itchy eyes- CATS    Penicillins RASH    Ragweed ITCHING and OTHER (SEE COMMENTS)     stuffiness       Psychosocial History:  Social History     Social History Narrative    .  6 adult children.  Used to be an ,      Social History     Socioeconomic History    Marital status:    Tobacco Use    Smoking status: Former     Current packs/day: 0.00     Average packs/day: 0.5 packs/day for 14.9 years (7.4 ttl pk-yrs)     Types: Cigarettes     Start date: 1953     Quit date: 1967     Years since quittin.4    Smokeless tobacco: Never    Tobacco comments:     quit over 50 years ago   Vaping Use    Vaping status: Never Used   Substance and Sexual Activity    Alcohol use: Not Currently    Drug use: Never   Other Topics Concern    Caffeine Concern Yes     Comment: coffee half a cup and occ coke    Exercise Yes     Comment: walking   Social History Narrative    .  6 adult children.  Used to be an ,      Family Medical History:  Family History   Problem Relation Age of Onset    Cancer Mother 53        Brain    Colon Cancer Father 73    Cancer Brother 55        Mesothelioma     Review of Systems:  A 10-point ROS was done with pertinent positives and negative per the HPI    Vital Signs:  Height: 157.5 cm (5' 2.01\") (  1035)  Weight: 58.2 kg (128 lb 3.2 oz) (05/14 1035)  BSA (Calculated - sq m): 1.58 sq meters (05/14 1035)  Pulse: 58 (05/14 1035)  BP: 150/74 (05/14 1035)  Temp: 97.7 °F (36.5 °C) (05/14 1035)  Do Not Use - Resp Rate: --  SpO2: 96 % (05/14 1035)    Wt Readings from Last 6 Encounters:   05/14/25 58.2 kg (128 lb 3.2 oz)   11/27/24 56.2 kg (124 lb)   11/13/24 55.3 kg (122 lb)   07/06/24 57.7 kg (127 lb 4 oz)   05/15/24 57.5 kg (126 lb 12.8 oz)   03/15/24 59 kg (130 lb)     ECOG PS: 3    Physical Examination:  General: Patient is alert and oriented, not in acute distress.  +Impaired short-term memory  Psych: Mood and affect are appropriate  Eyes: EOMI  ENT: Oropharynx is clear  CV: Regular rate and rhythm, no murmurs  Respiratory: Lungs clear to auscultation bilaterally  GI/Abd: Soft, non-tender with normoactive bowel sounds, no hepatosplenomegaly  Neurological: Grossly intact.  Unsteady gait.  Lymphatics: No palpable lymphadenopathy.  Skin: no rashes or petechiae. + Appears to have some raised dry skin lesions on scalp likely AKs or early SCC.   Ext: no LE edema today    Laboratory:  Lab Results   Component Value Date    WBC 5.1 05/14/2025    WBC 4.5 11/13/2024    WBC 4.4 05/15/2024    HGB 14.5 05/14/2025    HGB 15.1 11/13/2024    HGB 15.2 05/15/2024    HCT 40.4 05/14/2025    MCV 93.7 05/14/2025    MCH 33.6 05/14/2025    MCHC 35.9 05/14/2025    RDW 12.9 05/14/2025    .0 (L) 05/14/2025    .0 (L) 11/13/2024    .0 (L) 05/15/2024     Lab Results   Component Value Date    GLU 99 05/14/2025    BUN 14 05/14/2025    BUNCREA 19.8 08/02/2021    CREATSERUM 0.92 05/14/2025    CREATSERUM 0.89 11/13/2024    CREATSERUM 0.85 05/15/2024    ANIONGAP 7 05/14/2025    GFRNAA 83 07/27/2022    GFRAA 96 07/27/2022    CA 9.5 05/14/2025    OSMOCALC 295 05/14/2025    ALKPHO 91 05/14/2025    AST 18 05/14/2025    ALT 11 05/14/2025    BILT 0.8 05/14/2025    TP 6.3 05/14/2025    ALB 4.1 05/14/2025    GLOBULIN 2.2 05/14/2025      05/14/2025    K 4.2 05/14/2025     05/14/2025    CO2 26.0 05/14/2025     Lab Results   Component Value Date    INR 1.1 05/30/2024     Lab Results   Component Value Date     05/14/2025     11/13/2024     05/15/2024     02/14/2024     11/16/2023     08/17/2023     04/15/2023     01/13/2023     10/26/2022     07/27/2022     04/28/2022     03/10/2022     02/17/2022     (H) 01/27/2022     01/06/2022     (H) 12/30/2021     (H) 12/16/2021     (H) 12/09/2021     11/18/2021     10/28/2021     10/13/2021     10/06/2021     09/22/2021     08/02/2021     12/23/2020     Lab Results   Component Value Date    ESRML 8 12/23/2020     Lab Results   Component Value Date    CRP <0.29 12/23/2020     Lab Results   Component Value Date    PSA <0.01 01/13/2023    PSA <0.01 08/02/2021    PSA 0.01 11/24/2020     Component      Latest Ref Rng & Units 9/22/2021   Hbsag Screen Index       <0.10   HBSAg Screen      Nonreactive  Nonreactive   HEPATITIS B SURFACE AB QUAL      Nonreactive  Nonreactive   HEPATITIS B SURFACE AB QUANT      mIU/mL <3.10   HEPATITIS B CORE AB, TOTAL      Nonreactive  Nonreactive   HCV AB      Nonreactive  Nonreactive   HIV Antigen Antibody Combo      Non-Reactive Non-Reactive     Imaging:    TTE 9/25/21: Conclusions:   1. Left ventricle: The cavity size was normal. Wall thickness was normal. The estimated ejection fraction was 60-65%. No regional wall motion abnormalities. Doppler parameters are consistent with abnormal left ventricular relaxation - grade 1 diastolic dysfunction.   2. Aortic valve: Trivial regurgitation.   3. Mitral valve: Mildly calcified annulus. Mitral valve demonstrates mildly thickened leaflets. There was mild regurgitation.   4. Left atrium: The left atrium was mildly dilated.   5. 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  Completed BV-CHP (dose reduced CHP as per \"mini-CHOP\" protocol) x 6 cycles 3/2022.  His end of treatment PET scan showed Deauville 1, complete response.    -He is now >3 years out from end of treatment - no evidence of recurrent disease at this time.    -Continue surveillance monitoring as below:   -Clinic visit with CBC, CMP, LDH q6mths until 5 yrs out   -No further routine imaging surveillance is needed    *Lymphopenia, immunodeficiency  -Related to lymphoma and prior chemotherapy.    -Continue to stay up-to-date with recommended vaccinations-I specifically recommend he get an updated COVID-19 vaccine booster at this time.  -Repeat CBC with differential in 6 months     *LLE popliteal DVT and GSV thrombosis, dx'd 1/27/22  -completed 3 months of anticoagulation.  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 Remains off anticoagulation, no s/s of recurrence/progression.    *Gait instability  -Previously worked with physical therapy.  Advised to use cane to avoid falls.      *Urinary incontinence  -Chronic since prior prostatectomy.  Previously had pelvic floor therapy. Follow-up with urology prn.    *Dementia  -Currently on rivastigmine per neurology.    *Constipation  - Adequately controlled with fiber one supp    *elevated BP noted in clinic today  -it is recommended that patients follow with PCP for further blood pressure monitoring and management.    RTC in 6 months    Misha Goodwin MD  Hematology/Medical Oncology  [facility]    Risk level: High- T-cell lymphoma with multiple other comorbidities as detailed above requiring close monitoring.  ongoing continuity of complex care related to malignancy.

## 2025-05-15 PROBLEM — Z01.818 EXAMINATION PRIOR TO CHEMOTHERAPY: Status: RESOLVED | Noted: 2021-09-22 | Resolved: 2025-05-15

## 2025-05-29 ENCOUNTER — TELEPHONE (OUTPATIENT)
Dept: NEUROLOGY | Facility: CLINIC | Age: 85
End: 2025-05-29

## 2025-05-29 NOTE — TELEPHONE ENCOUNTER
Patients spouse contacted clinic requesting to meet with Dr. Cortez prior to the appointment scheduled for 05/30 @330, to discuss long term plan of care for patient. Please advise.

## (undated) DIAGNOSIS — I10 ESSENTIAL HYPERTENSION: Primary | ICD-10-CM

## (undated) DEVICE — LIGHT HANDLE

## (undated) DEVICE — DRAIN RELIAVAC 100CC

## (undated) DEVICE — ENDOSCOPY PACK UPPER: Brand: MEDLINE INDUSTRIES, INC.

## (undated) DEVICE — POM MASK W/CO2

## (undated) DEVICE — VIOLET BRAIDED (POLYGLACTIN 910), SYNTHETIC ABSORBABLE SUTURE: Brand: COATED VICRYL

## (undated) DEVICE — HEMOCLIP HORIZON LG 004200

## (undated) DEVICE — ENDOSCOPIC ULTRASOUND FINE NEEDLE BIOPSY (FNB) DEVICE: Brand: ACQUIRE

## (undated) DEVICE — SINGLE USE SUCTION VALVE MAJ-209: Brand: SINGLE USE SUCTION VALVE (STERILE)

## (undated) DEVICE — SOL  .9 1000ML BTL

## (undated) DEVICE — FILTERLINE NASAL ADULT O2/CO2

## (undated) DEVICE — SYRINGE 10ML SLIP TIP

## (undated) DEVICE — KENDALL SCD EXPRESS SLEEVES, KNEE LENGTH, MEDIUM: Brand: KENDALL SCD

## (undated) DEVICE — SUTURE VICRYL 2-0 SH

## (undated) DEVICE — GLOVE SURG TRIUMPH SZ 6-1/2

## (undated) DEVICE — BLADE 24 SS SRG STRL

## (undated) DEVICE — PROXIMATE SKIN STAPLERS (35 WIDE) CONTAINS 35 STAINLESS STEEL STAPLES (FIXED HEAD): Brand: PROXIMATE

## (undated) DEVICE — 1200CC GUARDIAN II: Brand: GUARDIAN

## (undated) DEVICE — BREAST-HERNIA-PORT CDS-LF: Brand: MEDLINE INDUSTRIES, INC.

## (undated) DEVICE — HEMOCLIP HORIZON MED 002200

## (undated) DEVICE — CHLORAPREP 26ML APPLICATOR

## (undated) DEVICE — DRAIN CHANNEL 19FR BLAKE

## (undated) DEVICE — AIRLIFE™ MISTY MAX 10™ NEBULIZER WITH 7 FOOT (2.1 M) FEMALE U/CONNECT-IT CRUSH RESISTANT OXYGEN TUBING, BAFFLED TEE ADAPTER (22 MM I.D./ 22 MM O.D.), MOUTHPIECE AND 6 INCH (15 CM) FLEXTUBE: Brand: AIRLIFE™

## (undated) DEVICE — SCD SLEEVE KNEE HI BLEND

## (undated) DEVICE — MEDI-VAC NON-CONDUCTIVE SUCTION TUBING: Brand: CARDINAL HEALTH

## (undated) DEVICE — LAPAROTOMY CDS: Brand: MEDLINE INDUSTRIES, INC.

## (undated) DEVICE — MEDI-VAC SUCTION HANDLE REGULAR CAPACITY: Brand: CARDINAL HEALTH

## (undated) DEVICE — 60 ML SYRINGE REGULAR TIP: Brand: MONOJECT

## (undated) DEVICE — PROXIMATE RH ROTATING HEAD SKIN STAPLERS (35 WIDE) CONTAINS 35 STAINLESS STEEL STAPLES: Brand: PROXIMATE

## (undated) DEVICE — STERILE POLYISOPRENE POWDER-FREE SURGICAL GLOVES: Brand: PROTEXIS

## (undated) DEVICE — SUTURE VICRYL 2-0

## (undated) DEVICE — 3M(TM) TEGADERM(TM) TRANSPARENT FILM DRESSING FRAME STYLE 1628: Brand: 3M™ TEGADERM™

## (undated) DEVICE — BOWLS UTILITY 16OZ

## (undated) DEVICE — STERILE SYNTHETIC POLYISOPRENE POWDER-FREE SURGICAL GLOVES WITH HYDROGEL COATING, SMOOTH FINISH, STRAIGHT FINGER: Brand: PROTEXIS

## (undated) DEVICE — SINGLE USE BIOPSY VALVE MAJ-210: Brand: SINGLE USE BIOPSY VALVE (STERILE)

## (undated) DEVICE — DRAPE HALF 40X58 DYNJP2410

## (undated) DEVICE — NON-ADHERENT PAD PREPACK: Brand: TELFA

## (undated) DEVICE — 3M™ RED DOT™ MONITORING ELECTRODE WITH FOAM TAPE AND STICKY GEL, 50/BAG, 20/CASE, 72/PLT 2570: Brand: RED DOT™

## (undated) DEVICE — MASK ISOLATION

## (undated) DEVICE — LENS FRAMES DISP EYEWEAR

## (undated) DEVICE — SUTURE ETHILON 2-0 FS

## (undated) DEVICE — SPONGE: SPECIALTY PEANUT XR 100/CS: Brand: MEDICAL ACTION INDUSTRIES

## (undated) DEVICE — SPONGE STICK WITH PVP-I: Brand: KENDALL

## (undated) DEVICE — UNDYED BRAIDED (POLYGLACTIN 910), SYNTHETIC ABSORBABLE SUTURE: Brand: COATED VICRYL

## (undated) DEVICE — TOWEL SURG OR 17X30IN BLUE

## (undated) DEVICE — FORCEPS BX 100CM 1.8MM RJ STD

## (undated) DEVICE — TRAP SPECIMEN MUCOUS 70 CUBIC CENTIMETER POLYURETHANE STERILE NOT MADE WITH NATURAL RUBBER LATEX MEDICHOICE: Brand: MEDICHOICE

## (undated) DEVICE — SUTURE VICRYL 3-0 SH

## (undated) DEVICE — LENS PLASTIC DISP EYEWEAR

## (undated) DEVICE — SHEET, T, LAPAROTOMY, STERILE: Brand: MEDLINE

## (undated) NOTE — LETTER
20    Patient: Laney Mederos  : 1940 Visit date: 2020    Dear  Aleta Main MD    Thank you for referring Laney Mederos to my practice. Please find my assessment and plan below.       Assessment   Prostate cancer ([facility] [address]  (hugh As part of his work-up for this abdominal lymph node, we had him scheduled to see Dr. Meaghan Thompson. He should keep that appointment. He was also scheduled to get a nuclear medicine scan regarding the abdominal lymphadenopathy.   We did put a PSA test in

## (undated) NOTE — Clinical Note
Hi, I saw the patient today, continue to have dizziness mainly positional and his BP was low in the office  + orthostatics. I advise him to hold Amlodipine and follow with you. Thanks!

## (undated) NOTE — LETTER
Patient Name: Domenica Buck  : 1940  MRN: [id number]  Patient Address: [address] [place] [id number]      Coronavirus Disease 2019 (COVID-19)     [facility] is committed to the safety and well-being of our patients, memb carefully. If your symptoms get worse, call your healthcare provider immediately. 3. Get rest and stay hydrated.    4. If you have a medical appointment, call the healthcare provider ahead of time and tell them that you have or may have COVID-19.  5. For m of fever-reducing medications; and  · Improvement in respiratory symptoms (e.g., cough, shortness of breath); and  · At least 10 days have passed since symptoms first appeared OR if asymptomatic patient or date of symptom onset is unclear then use 10 days donors must:    · Have had a confirmed diagnosis of COVID-19  · Be symptom-free for at least 14 days*    *Some people will be required to have a repeat COVID-19 test in order to be eligible to donate.  If you’re instructed by Chris that a repeat test is r random. Researchers are trying to identify similarities between people with a Post-COVID condition to better understand if there are risk factors. How do I prevent a Post-COVID condition?   The best way to prevent the long-term symptoms of COVID-19 is

## (undated) NOTE — Clinical Note
Can you use the time I spent to [facility] your billing if you're billing by time?

## (undated) NOTE — LETTER
Printed: 2021    Patient Name: Satish Lewis  : 1940   Medical Record #: [id number]    Consent to [id number], understand that I have been diagnosed with Peripheral T-cell Lymphoma.     I understand that t questions have been answered to my satisfaction. I understand that I can contact my oncologist,  or my Cancer Care Team at any time if I have questions, by calling [phone].    Additional written information will be given to me prior to start of therap

## (undated) NOTE — LETTER
10/30/20    Patient: Negar Wall  : 1940 Visit date: 10/29/2020    Dear  Ana Luisa Tipton MD    Thank you for referring Negar Wall to my practice. Please find my assessment and plan below.     Assessment   Lymphadenopathy  (primary enco The patient also complains of a rash on his right lower quadrant. He had a blistering infection on his lower extremities. He states that he has had allergic reactions on the legs and abdomen.   This is been treated in the past, but he had a bad reaction t Finally, his incarcerated hernia can still go forward on November 11, 2020. None of the above listed investigations or treatments should interfere with that.   There is a small chance that is diminished appetite and weight loss is secondary to the incarcer

## (undated) NOTE — LETTER
10/22/20        [address] [place] [id number]      Dear Gena Ceblalos records indicate that you have outstanding lab work and or testing that was ordered for you and has not yet been completed:  Orders Placed This Encounter      C